# Patient Record
Sex: MALE | Race: OTHER | HISPANIC OR LATINO | URBAN - METROPOLITAN AREA
[De-identification: names, ages, dates, MRNs, and addresses within clinical notes are randomized per-mention and may not be internally consistent; named-entity substitution may affect disease eponyms.]

---

## 2021-08-23 ENCOUNTER — EMERGENCY (EMERGENCY)
Facility: HOSPITAL | Age: 76
LOS: 1 days | Discharge: HOME | End: 2021-08-23
Attending: EMERGENCY MEDICINE
Payer: MEDICARE

## 2021-08-23 ENCOUNTER — TRANSCRIPTION ENCOUNTER (OUTPATIENT)
Age: 76
End: 2021-08-23

## 2021-08-23 VITALS
TEMPERATURE: 103 F | DIASTOLIC BLOOD PRESSURE: 84 MMHG | WEIGHT: 214.95 LBS | RESPIRATION RATE: 19 BRPM | HEART RATE: 109 BPM | SYSTOLIC BLOOD PRESSURE: 190 MMHG | HEIGHT: 69 IN | OXYGEN SATURATION: 93 %

## 2021-08-23 VITALS
HEART RATE: 55 BPM | OXYGEN SATURATION: 94 % | RESPIRATION RATE: 20 BRPM | DIASTOLIC BLOOD PRESSURE: 64 MMHG | SYSTOLIC BLOOD PRESSURE: 170 MMHG | TEMPERATURE: 99 F

## 2021-08-23 LAB
ALBUMIN SERPL ELPH-MCNC: 3.7 G/DL — SIGNIFICANT CHANGE UP (ref 3.3–5.2)
ALP SERPL-CCNC: 179 U/L — HIGH (ref 40–120)
ALT FLD-CCNC: 21 U/L — SIGNIFICANT CHANGE UP
ANION GAP SERPL CALC-SCNC: 16 MMOL/L — SIGNIFICANT CHANGE UP (ref 5–17)
AST SERPL-CCNC: 34 U/L — SIGNIFICANT CHANGE UP
BASOPHILS # BLD AUTO: 0 K/UL — SIGNIFICANT CHANGE UP (ref 0–0.2)
BASOPHILS NFR BLD AUTO: 0 % — SIGNIFICANT CHANGE UP (ref 0–2)
BILIRUB SERPL-MCNC: 1 MG/DL — SIGNIFICANT CHANGE UP (ref 0.4–2)
BUN SERPL-MCNC: 12.9 MG/DL — SIGNIFICANT CHANGE UP (ref 8–20)
CALCIUM SERPL-MCNC: 9 MG/DL — SIGNIFICANT CHANGE UP (ref 8.6–10.2)
CHLORIDE SERPL-SCNC: 92 MMOL/L — LOW (ref 98–107)
CO2 SERPL-SCNC: 23 MMOL/L — SIGNIFICANT CHANGE UP (ref 22–29)
CREAT SERPL-MCNC: 1.02 MG/DL — SIGNIFICANT CHANGE UP (ref 0.5–1.3)
CRP SERPL-MCNC: 178 MG/L — HIGH
EOSINOPHIL # BLD AUTO: 0 K/UL — SIGNIFICANT CHANGE UP (ref 0–0.5)
EOSINOPHIL NFR BLD AUTO: 0 % — SIGNIFICANT CHANGE UP (ref 0–6)
FERRITIN SERPL-MCNC: 614 NG/ML — HIGH (ref 30–400)
GLUCOSE SERPL-MCNC: 114 MG/DL — HIGH (ref 70–99)
HCT VFR BLD CALC: 44.5 % — SIGNIFICANT CHANGE UP (ref 39–50)
HGB BLD-MCNC: 15.6 G/DL — SIGNIFICANT CHANGE UP (ref 13–17)
LYMPHOCYTES # BLD AUTO: 0.54 K/UL — LOW (ref 1–3.3)
LYMPHOCYTES # BLD AUTO: 5.1 % — LOW (ref 13–44)
MANUAL SMEAR VERIFICATION: SIGNIFICANT CHANGE UP
MCHC RBC-ENTMCNC: 30.1 PG — SIGNIFICANT CHANGE UP (ref 27–34)
MCHC RBC-ENTMCNC: 35.1 GM/DL — SIGNIFICANT CHANGE UP (ref 32–36)
MCV RBC AUTO: 85.7 FL — SIGNIFICANT CHANGE UP (ref 80–100)
MONOCYTES # BLD AUTO: 0.73 K/UL — SIGNIFICANT CHANGE UP (ref 0–0.9)
MONOCYTES NFR BLD AUTO: 6.9 % — SIGNIFICANT CHANGE UP (ref 2–14)
NEUTROPHILS # BLD AUTO: 9.29 K/UL — HIGH (ref 1.8–7.4)
NEUTROPHILS NFR BLD AUTO: 88 % — HIGH (ref 43–77)
NT-PROBNP SERPL-SCNC: 2431 PG/ML — HIGH (ref 0–300)
PLAT MORPH BLD: NORMAL — SIGNIFICANT CHANGE UP
PLATELET # BLD AUTO: 169 K/UL — SIGNIFICANT CHANGE UP (ref 150–400)
POTASSIUM SERPL-MCNC: 4 MMOL/L — SIGNIFICANT CHANGE UP (ref 3.5–5.3)
POTASSIUM SERPL-SCNC: 4 MMOL/L — SIGNIFICANT CHANGE UP (ref 3.5–5.3)
PROCALCITONIN SERPL-MCNC: 0.15 NG/ML — HIGH (ref 0.02–0.1)
PROT SERPL-MCNC: 7.7 G/DL — SIGNIFICANT CHANGE UP (ref 6.6–8.7)
RBC # BLD: 5.19 M/UL — SIGNIFICANT CHANGE UP (ref 4.2–5.8)
RBC # FLD: 12 % — SIGNIFICANT CHANGE UP (ref 10.3–14.5)
RBC BLD AUTO: NORMAL — SIGNIFICANT CHANGE UP
SODIUM SERPL-SCNC: 130 MMOL/L — LOW (ref 135–145)
TROPONIN T SERPL-MCNC: <0.01 NG/ML — SIGNIFICANT CHANGE UP (ref 0–0.06)
WBC # BLD: 10.56 K/UL — HIGH (ref 3.8–10.5)
WBC # FLD AUTO: 10.56 K/UL — HIGH (ref 3.8–10.5)

## 2021-08-23 PROCEDURE — 93005 ELECTROCARDIOGRAM TRACING: CPT

## 2021-08-23 PROCEDURE — 71045 X-RAY EXAM CHEST 1 VIEW: CPT | Mod: 26

## 2021-08-23 PROCEDURE — 36415 COLL VENOUS BLD VENIPUNCTURE: CPT

## 2021-08-23 PROCEDURE — 84145 PROCALCITONIN (PCT): CPT

## 2021-08-23 PROCEDURE — U0005: CPT

## 2021-08-23 PROCEDURE — 71045 X-RAY EXAM CHEST 1 VIEW: CPT

## 2021-08-23 PROCEDURE — 99285 EMERGENCY DEPT VISIT HI MDM: CPT | Mod: 25

## 2021-08-23 PROCEDURE — 84484 ASSAY OF TROPONIN QUANT: CPT

## 2021-08-23 PROCEDURE — U0003: CPT

## 2021-08-23 PROCEDURE — 82728 ASSAY OF FERRITIN: CPT

## 2021-08-23 PROCEDURE — 85025 COMPLETE CBC W/AUTO DIFF WBC: CPT

## 2021-08-23 PROCEDURE — 93010 ELECTROCARDIOGRAM REPORT: CPT

## 2021-08-23 PROCEDURE — 94640 AIRWAY INHALATION TREATMENT: CPT

## 2021-08-23 PROCEDURE — 96374 THER/PROPH/DIAG INJ IV PUSH: CPT

## 2021-08-23 PROCEDURE — 80053 COMPREHEN METABOLIC PANEL: CPT

## 2021-08-23 PROCEDURE — 83880 ASSAY OF NATRIURETIC PEPTIDE: CPT

## 2021-08-23 PROCEDURE — 86140 C-REACTIVE PROTEIN: CPT

## 2021-08-23 PROCEDURE — 99285 EMERGENCY DEPT VISIT HI MDM: CPT

## 2021-08-23 RX ORDER — ACETAMINOPHEN 500 MG
975 TABLET ORAL ONCE
Refills: 0 | Status: COMPLETED | OUTPATIENT
Start: 2021-08-23 | End: 2021-08-23

## 2021-08-23 RX ORDER — ALBUTEROL 90 UG/1
2 AEROSOL, METERED ORAL
Qty: 1 | Refills: 0
Start: 2021-08-23 | End: 2021-08-29

## 2021-08-23 RX ORDER — DEXAMETHASONE 0.5 MG/5ML
6 ELIXIR ORAL ONCE
Refills: 0 | Status: COMPLETED | OUTPATIENT
Start: 2021-08-23 | End: 2021-08-23

## 2021-08-23 RX ORDER — SODIUM CHLORIDE 9 MG/ML
1000 INJECTION, SOLUTION INTRAVENOUS ONCE
Refills: 0 | Status: COMPLETED | OUTPATIENT
Start: 2021-08-23 | End: 2021-08-23

## 2021-08-23 RX ORDER — ALBUTEROL 90 UG/1
2 AEROSOL, METERED ORAL ONCE
Refills: 0 | Status: COMPLETED | OUTPATIENT
Start: 2021-08-23 | End: 2021-08-23

## 2021-08-23 RX ADMIN — Medication 975 MILLIGRAM(S): at 16:56

## 2021-08-23 RX ADMIN — ALBUTEROL 2 PUFF(S): 90 AEROSOL, METERED ORAL at 16:56

## 2021-08-23 RX ADMIN — Medication 6 MILLIGRAM(S): at 16:56

## 2021-08-23 RX ADMIN — SODIUM CHLORIDE 1000 MILLILITER(S): 9 INJECTION, SOLUTION INTRAVENOUS at 16:56

## 2021-08-23 RX ADMIN — Medication 100 MILLIGRAM(S): at 19:06

## 2021-08-23 NOTE — ED ADULT NURSE NOTE - OBJECTIVE STATEMENT
Patient presents to ED +COVID for the last ten days, states he has had fever, cough, chills, diarrhea and SOB. last took tylenol for fever at 10AM this morning. denies chest pain, headache, n/v.

## 2021-08-23 NOTE — ED PROVIDER NOTE - OBJECTIVE STATEMENT
74 yo M hx DM, BPH, and HTN p/w fever, cough, and congestion x 5 days. last dose of Tylenol 4 hours prior to arrival. patient flew from [place] 10 days ago. family sick with Covid. patient was vaccinated with 2 dose of Pfizer in July.

## 2021-08-23 NOTE — ED PROVIDER NOTE - PATIENT PORTAL LINK FT
You can access the FollowMyHealth Patient Portal offered by [facility] by registering at the following website: [email or web address]. By joining [employer]’s FollowMyHealth portal, you will also be able to view your health information using other applications (apps) compatible with our system.

## 2021-08-23 NOTE — ED PROVIDER NOTE - PHYSICAL EXAMINATION
VITAL SIGNS: I have reviewed nursing notes and confirm.  CONSTITUTIONAL:  in no acute distress.  SKIN: Skin exam is warm and dry, no acute rash.  HEAD: Normocephalic; atraumatic.  EYES: PERRL, EOM intact; conjunctiva and sclera clear.  ENT: No nasal discharge; airway clear. Throat clear.  NECK: Supple; non tender.    CARD: (+) tachycardia   RESP: No wheezes,  (+) coarse lung sound.   ABD:  soft; non-distended; non-tender;   EXT: Normal ROM. No clubbing, cyanosis or edema.  NEURO: Alert, oriented. Grossly unremarkable. No focal deficits.  moves all extremities,  normal gait   PSYCH: Cooperative, appropriate.

## 2021-08-23 NOTE — ED PROVIDER NOTE - NS ED ROS FT
Review of Systems  •	CONSTITUTIONAL - (+)   fever, no diaphoresis, no weight change  •	SKIN - no rash   •	HEMATOLOGIC - no bleeding, no bruising  •	EYES - no eye pain, no blurred vision  •	ENT - no change in hearing, no pain  •	RESPIRATORY -(+)  shortness of breath, (+)  cough   •	CARDIAC - no chest pain, no palpitations  •	GI - no abd pain, no nausea, no vomiting, no diarrhea, no constipation, no bleeding  •	GENITO-URINARY - no discharge, no dysuria; no hematuria,   •	ENDO - no polydipsia, no polyuria, no heat/no cold intolerance  •	MUSCULOSKELETAL - no joint pain, no swelling, no redness  •	NEUROLOGIC - no weakness, no headache, no anesthesia, no paresthesias  •	PSYCH - no anxiety, non suicidal, non homicidal, no hallucination, no depression

## 2021-08-24 PROBLEM — Z00.00 ENCOUNTER FOR PREVENTIVE HEALTH EXAMINATION: Status: ACTIVE | Noted: 2021-08-24

## 2021-08-24 LAB — SARS-COV-2 RNA SPEC QL NAA+PROBE: DETECTED

## 2021-08-26 ENCOUNTER — OUTPATIENT (OUTPATIENT)
Dept: OUTPATIENT SERVICES | Facility: HOSPITAL | Age: 76
LOS: 1 days | End: 2021-08-26
Payer: MEDICARE

## 2021-08-26 ENCOUNTER — APPOINTMENT (OUTPATIENT)
Dept: DISASTER EMERGENCY | Facility: HOSPITAL | Age: 76
End: 2021-08-26

## 2021-08-26 VITALS
HEART RATE: 48 BPM | TEMPERATURE: 98 F | WEIGHT: 214.95 LBS | RESPIRATION RATE: 18 BRPM | SYSTOLIC BLOOD PRESSURE: 193 MMHG | DIASTOLIC BLOOD PRESSURE: 62 MMHG | HEIGHT: 71 IN | OXYGEN SATURATION: 98 %

## 2021-08-26 VITALS
SYSTOLIC BLOOD PRESSURE: 151 MMHG | HEART RATE: 44 BPM | DIASTOLIC BLOOD PRESSURE: 59 MMHG | TEMPERATURE: 98 F | RESPIRATION RATE: 18 BRPM

## 2021-08-26 DIAGNOSIS — U07.1 COVID-19: ICD-10-CM

## 2021-08-26 PROCEDURE — M0243: CPT

## 2021-08-26 RX ORDER — SODIUM CHLORIDE 9 MG/ML
250 INJECTION INTRAMUSCULAR; INTRAVENOUS; SUBCUTANEOUS
Refills: 0 | Status: DISCONTINUED | OUTPATIENT
Start: 2021-08-26 | End: 2021-09-09

## 2021-08-26 RX ADMIN — SODIUM CHLORIDE 25 MILLILITER(S): 9 INJECTION INTRAMUSCULAR; INTRAVENOUS; SUBCUTANEOUS at 12:33

## 2021-08-26 NOTE — CHART NOTE - NSCHARTNOTEFT_GEN_A_CORE
CC: Monoclonal Antibody Infusion/COVID 19 Positive  75y obese Male with pmhx of DM2, HTN, & BPH, presents today for monoclonal antibody infusion. Patient endorses onset of symptoms 8/19, consisting of cough & congestion, tested + for COVID 8/23, referred by  for infusion today.    exam/findings:  T(C): 36.6 (08-26-21 @ 11:36), Max: 36.6 (08-26-21 @ 11:36)  HR: 48 (08-26-21 @ 11:36) (48 - 48)  BP: 193/62 (08-26-21 @ 11:36) (193/62 - 193/62)  RR: 18 (08-26-21 @ 11:36) (18 - 18)  SpO2: 98% (08-26-21 @ 11:36) (98% - 98%)      PE:   Appearance: NAD	  HEENT:   Normal oral mucosa, 	  Skin: warm and dry  Neurologic: Non-focal  Extremities: Normal range of motion,    ASSESSMENT:  Pt is a 75y obese Male with pmhx of DM2, HTN, & BPH, tested + for COVID 8/23, referred by  to infusion center for Monoclonal antibody infusion (Casirivimab/imdevimab,)  Symptoms/ Criteria: Cough & congestion  Risk Profile includes: Age >55, BMI>25, Hx of HTN & Dm2    PLAN:  - Infusion procedure explained to patient   - Consent for monoclonal antibody infusion obtained   - Risk & benefits discussed/all questions answered  - Infuse Casirivimab/imdevimab 600mg  IV over one hour   - Observe patient for one hour post infusion    I have reviewed the Casirivimab/imdevimab, Emergency Use Authorization (EUA) and I have provided the patient or patient's caregiver with the following information:      1. FDA has authorized emergency use Casirivimab/imdevimab,, which is not an FDA-approved biological product.  2. The patient or patient's caregiver has the option to accept or refuse administration of Casirivimab/imdevimab,.   3. The significant known and potential risks and benefits of Casirivimab/imdevimab, and the extent to which such risks and benefits are unknown.  4. Information on available alternative treatments and risks and benefits of those alternatives. CC: Monoclonal Antibody Infusion/COVID 19 Positive  75y obese Male with pmhx of DM2, HTN, & BPH, presents today for monoclonal antibody infusion. Patient endorses onset of symptoms 8/19, consisting of cough & congestion, tested + for COVID 8/23, referred by  for infusion today. Patient is fully vaccinated.    exam/findings:  T(C): 36.6 (08-26-21 @ 11:36), Max: 36.6 (08-26-21 @ 11:36)  HR: 48 (08-26-21 @ 11:36) (48 - 48)  BP: 193/62 (08-26-21 @ 11:36) (193/62 - 193/62)  RR: 18 (08-26-21 @ 11:36) (18 - 18)  SpO2: 98% (08-26-21 @ 11:36) (98% - 98%)      PE:   Appearance: NAD	  HEENT:   Normal oral mucosa, 	  Skin: warm and dry  Neurologic: Non-focal  Extremities: Normal range of motion,    ASSESSMENT:  Pt is a 75y obese Male with pmhx of DM2, HTN, & BPH, tested + for COVID 8/23, referred by  to infusion center for Monoclonal antibody infusion (Casirivimab/imdevimab,)  Symptoms/ Criteria: Cough & congestion  Risk Profile includes: Age >55, BMI>25, Hx of HTN & Dm2    PLAN:  - Infusion procedure explained to patient   - Consent for monoclonal antibody infusion obtained   - Risk & benefits discussed/all questions answered  - Infuse Casirivimab/imdevimab 600mg  IV over one hour   - Observe patient for one hour post infusion    I have reviewed the Casirivimab/imdevimab, Emergency Use Authorization (EUA) and I have provided the patient or patient's caregiver with the following information:      1. FDA has authorized emergency use Casirivimab/imdevimab,, which is not an FDA-approved biological product.  2. The patient or patient's caregiver has the option to accept or refuse administration of Casirivimab/imdevimab,.   3. The significant known and potential risks and benefits of Casirivimab/imdevimab, and the extent to which such risks and benefits are unknown.  4. Information on available alternative treatments and risks and benefits of those alternatives.

## 2021-08-26 NOTE — CHART NOTE - NSCHARTNOTEFT_GEN_A_CORE
I have reviewed the  Casirivimab/Imdevimab Emergency Use Authorization (EAU) and I have provided the patient or patient's caregiver with the following information:  1. FDA has authorized emergency use of Casirivimab/Imdevimab , which is not FDA-approved biologic product.  2. The patient or patient's caregiver has the option to accept or refuse administration of Casirivimab  3. The significant risks and benefits are unknown.  4. Information on available alternative treatments and risks and benefits of those alternatives.    Discharge:  T(C): 36.7 (08-26-21 @ 13:57), Max: 36.9 (08-26-21 @ 12:40)  HR: 44 (08-26-21 @ 13:57) (42 - 48)  BP: 151/59 (08-26-21 @ 13:57) (140/57 - 193/62)  RR: 18 (08-26-21 @ 13:57) (17 - 20)  SpO2: 95% (08-26-21 @ 12:57) (95% - 98%)  Patient tolerated infusion well denies complaints of chest pain/SOB/dizzines/ palps  VSS for discharge home  D/C instructions given/ fact sheet included.  Patient to follow-up with PCP as needed

## 2021-08-29 ENCOUNTER — TRANSCRIPTION ENCOUNTER (OUTPATIENT)
Age: 76
End: 2021-08-29

## 2021-12-11 ENCOUNTER — RESULT REVIEW (OUTPATIENT)
Age: 76
End: 2021-12-11

## 2021-12-11 ENCOUNTER — INPATIENT (INPATIENT)
Facility: HOSPITAL | Age: 76
LOS: 8 days | Discharge: HOME | DRG: 271 | End: 2021-12-20
Attending: FAMILY MEDICINE | Admitting: HOSPITALIST
Payer: MEDICARE

## 2021-12-11 VITALS
OXYGEN SATURATION: 99 % | HEART RATE: 69 BPM | DIASTOLIC BLOOD PRESSURE: 64 MMHG | HEIGHT: 71 IN | TEMPERATURE: 98 F | RESPIRATION RATE: 16 BRPM | WEIGHT: 212.08 LBS | SYSTOLIC BLOOD PRESSURE: 146 MMHG

## 2021-12-11 DIAGNOSIS — I31.3 PERICARDIAL EFFUSION (NONINFLAMMATORY): ICD-10-CM

## 2021-12-11 LAB
ALBUMIN SERPL ELPH-MCNC: 3.5 G/DL — SIGNIFICANT CHANGE UP (ref 3.3–5.2)
ALP SERPL-CCNC: 215 U/L — HIGH (ref 40–120)
ALT FLD-CCNC: 28 U/L — SIGNIFICANT CHANGE UP
ANION GAP SERPL CALC-SCNC: 11 MMOL/L — SIGNIFICANT CHANGE UP (ref 5–17)
APPEARANCE UR: CLEAR — SIGNIFICANT CHANGE UP
AST SERPL-CCNC: 25 U/L — SIGNIFICANT CHANGE UP
BACTERIA # UR AUTO: ABNORMAL
BASOPHILS # BLD AUTO: 0.03 K/UL — SIGNIFICANT CHANGE UP (ref 0–0.2)
BASOPHILS NFR BLD AUTO: 0.3 % — SIGNIFICANT CHANGE UP (ref 0–2)
BILIRUB SERPL-MCNC: 0.9 MG/DL — SIGNIFICANT CHANGE UP (ref 0.4–2)
BILIRUB UR-MCNC: ABNORMAL
BUN SERPL-MCNC: 19.8 MG/DL — SIGNIFICANT CHANGE UP (ref 8–20)
CALCIUM SERPL-MCNC: 8.4 MG/DL — LOW (ref 8.6–10.2)
CHLORIDE SERPL-SCNC: 100 MMOL/L — SIGNIFICANT CHANGE UP (ref 98–107)
CO2 SERPL-SCNC: 25 MMOL/L — SIGNIFICANT CHANGE UP (ref 22–29)
COLOR SPEC: ABNORMAL
CREAT SERPL-MCNC: 1.34 MG/DL — HIGH (ref 0.5–1.3)
D DIMER BLD IA.RAPID-MCNC: 1136 NG/ML DDU — HIGH
DIFF PNL FLD: ABNORMAL
EOSINOPHIL # BLD AUTO: 0.19 K/UL — SIGNIFICANT CHANGE UP (ref 0–0.5)
EOSINOPHIL NFR BLD AUTO: 1.9 % — SIGNIFICANT CHANGE UP (ref 0–6)
EPI CELLS # UR: SIGNIFICANT CHANGE UP
ERYTHROCYTE [SEDIMENTATION RATE] IN BLOOD: 45 MM/HR — HIGH (ref 0–20)
GLUCOSE SERPL-MCNC: 103 MG/DL — HIGH (ref 70–99)
GLUCOSE UR QL: NEGATIVE MG/DL — SIGNIFICANT CHANGE UP
HCT VFR BLD CALC: 40.3 % — SIGNIFICANT CHANGE UP (ref 39–50)
HGB BLD-MCNC: 13.7 G/DL — SIGNIFICANT CHANGE UP (ref 13–17)
HYALINE CASTS # UR AUTO: ABNORMAL /LPF
IMM GRANULOCYTES NFR BLD AUTO: 1.6 % — HIGH (ref 0–1.5)
KETONES UR-MCNC: ABNORMAL
LACTATE BLDV-MCNC: 0.9 MMOL/L — SIGNIFICANT CHANGE UP (ref 0.5–2)
LEUKOCYTE ESTERASE UR-ACNC: ABNORMAL
LIDOCAIN IGE QN: 57 U/L — HIGH (ref 22–51)
LYMPHOCYTES # BLD AUTO: 0.96 K/UL — LOW (ref 1–3.3)
LYMPHOCYTES # BLD AUTO: 9.7 % — LOW (ref 13–44)
MCHC RBC-ENTMCNC: 30.5 PG — SIGNIFICANT CHANGE UP (ref 27–34)
MCHC RBC-ENTMCNC: 34 GM/DL — SIGNIFICANT CHANGE UP (ref 32–36)
MCV RBC AUTO: 89.8 FL — SIGNIFICANT CHANGE UP (ref 80–100)
MONOCYTES # BLD AUTO: 1.04 K/UL — HIGH (ref 0–0.9)
MONOCYTES NFR BLD AUTO: 10.5 % — SIGNIFICANT CHANGE UP (ref 2–14)
NEUTROPHILS # BLD AUTO: 7.49 K/UL — HIGH (ref 1.8–7.4)
NEUTROPHILS NFR BLD AUTO: 76 % — SIGNIFICANT CHANGE UP (ref 43–77)
NITRITE UR-MCNC: NEGATIVE — SIGNIFICANT CHANGE UP
NT-PROBNP SERPL-SCNC: 423 PG/ML — HIGH (ref 0–300)
PH UR: 5 — SIGNIFICANT CHANGE UP (ref 5–8)
PLATELET # BLD AUTO: 320 K/UL — SIGNIFICANT CHANGE UP (ref 150–400)
POTASSIUM SERPL-MCNC: 4.7 MMOL/L — SIGNIFICANT CHANGE UP (ref 3.5–5.3)
POTASSIUM SERPL-SCNC: 4.7 MMOL/L — SIGNIFICANT CHANGE UP (ref 3.5–5.3)
PROT SERPL-MCNC: 7.2 G/DL — SIGNIFICANT CHANGE UP (ref 6.6–8.7)
PROT UR-MCNC: 30 MG/DL
RAPID RVP RESULT: SIGNIFICANT CHANGE UP
RBC # BLD: 4.49 M/UL — SIGNIFICANT CHANGE UP (ref 4.2–5.8)
RBC # FLD: 12.3 % — SIGNIFICANT CHANGE UP (ref 10.3–14.5)
RBC CASTS # UR COMP ASSIST: SIGNIFICANT CHANGE UP /HPF (ref 0–4)
SARS-COV-2 RNA SPEC QL NAA+PROBE: SIGNIFICANT CHANGE UP
SODIUM SERPL-SCNC: 136 MMOL/L — SIGNIFICANT CHANGE UP (ref 135–145)
SP GR SPEC: 1.02 — SIGNIFICANT CHANGE UP (ref 1.01–1.02)
TROPONIN T SERPL-MCNC: <0.01 NG/ML — SIGNIFICANT CHANGE UP (ref 0–0.06)
TROPONIN T SERPL-MCNC: <0.01 NG/ML — SIGNIFICANT CHANGE UP (ref 0–0.06)
UROBILINOGEN FLD QL: 1 MG/DL
WBC # BLD: 9.87 K/UL — SIGNIFICANT CHANGE UP (ref 3.8–10.5)
WBC # FLD AUTO: 9.87 K/UL — SIGNIFICANT CHANGE UP (ref 3.8–10.5)
WBC UR QL: ABNORMAL

## 2021-12-11 PROCEDURE — 88302 TISSUE EXAM BY PATHOLOGIST: CPT | Mod: 26

## 2021-12-11 PROCEDURE — 99223 1ST HOSP IP/OBS HIGH 75: CPT

## 2021-12-11 PROCEDURE — 93308 TTE F-UP OR LMTD: CPT | Mod: 26

## 2021-12-11 PROCEDURE — 93010 ELECTROCARDIOGRAM REPORT: CPT | Mod: 76

## 2021-12-11 PROCEDURE — 88305 TISSUE EXAM BY PATHOLOGIST: CPT | Mod: 26

## 2021-12-11 PROCEDURE — 99285 EMERGENCY DEPT VISIT HI MDM: CPT | Mod: 25

## 2021-12-11 PROCEDURE — 71045 X-RAY EXAM CHEST 1 VIEW: CPT | Mod: 26

## 2021-12-11 PROCEDURE — 71275 CT ANGIOGRAPHY CHEST: CPT | Mod: 26,ME

## 2021-12-11 PROCEDURE — 74177 CT ABD & PELVIS W/CONTRAST: CPT | Mod: 26,ME

## 2021-12-11 PROCEDURE — 88311 DECALCIFY TISSUE: CPT | Mod: 26

## 2021-12-11 PROCEDURE — G1004: CPT

## 2021-12-11 RX ORDER — INDOMETHACIN 50 MG
25 CAPSULE ORAL ONCE
Refills: 0 | Status: COMPLETED | OUTPATIENT
Start: 2021-12-11 | End: 2021-12-11

## 2021-12-11 RX ORDER — CEPHALEXIN 500 MG
500 CAPSULE ORAL ONCE
Refills: 0 | Status: COMPLETED | OUTPATIENT
Start: 2021-12-11 | End: 2021-12-11

## 2021-12-11 RX ORDER — LABETALOL HCL 100 MG
10 TABLET ORAL ONCE
Refills: 0 | Status: COMPLETED | OUTPATIENT
Start: 2021-12-11 | End: 2021-12-11

## 2021-12-11 RX ORDER — PANTOPRAZOLE SODIUM 20 MG/1
40 TABLET, DELAYED RELEASE ORAL ONCE
Refills: 0 | Status: COMPLETED | OUTPATIENT
Start: 2021-12-11 | End: 2021-12-11

## 2021-12-11 RX ORDER — GABAPENTIN 400 MG/1
600 CAPSULE ORAL
Refills: 0 | Status: DISCONTINUED | OUTPATIENT
Start: 2021-12-11 | End: 2021-12-17

## 2021-12-11 RX ORDER — CARVEDILOL PHOSPHATE 80 MG/1
6.25 CAPSULE, EXTENDED RELEASE ORAL EVERY 12 HOURS
Refills: 0 | Status: DISCONTINUED | OUTPATIENT
Start: 2021-12-11 | End: 2021-12-15

## 2021-12-11 RX ORDER — ALBUTEROL 90 UG/1
2 AEROSOL, METERED ORAL EVERY 6 HOURS
Refills: 0 | Status: DISCONTINUED | OUTPATIENT
Start: 2021-12-11 | End: 2021-12-17

## 2021-12-11 RX ADMIN — Medication 500 MILLIGRAM(S): at 16:43

## 2021-12-11 RX ADMIN — Medication 10 MILLIGRAM(S): at 20:37

## 2021-12-11 RX ADMIN — Medication 25 MILLIGRAM(S): at 20:37

## 2021-12-11 RX ADMIN — PANTOPRAZOLE SODIUM 40 MILLIGRAM(S): 20 TABLET, DELAYED RELEASE ORAL at 19:44

## 2021-12-11 RX ADMIN — Medication 25 MILLIGRAM(S): at 21:20

## 2021-12-11 NOTE — CONSULT NOTE ADULT - SUBJECTIVE AND OBJECTIVE BOX
[place] [facility]                                                        Office: [address] [address]                                                       Telephone: [phone]. Fax:[phone]                                                              CARDIOLOGY CONSULTATION NOTE                                                                                             Consult requested by:  Dr Cotton    PCP- in [place]    Primary Cardiologist.- None    Reason for Consultation:  Pericardial effusion.    History obtained by: Patient and medical record/ Patients daughter at bedside, who is translating - Gibraltarian     obtained: No    Chief complaint:    Patient is a 75y old  Male who presents with a chief complaint of     HPI:  ER HPI- 76 yo M hx of kidney ca and colon ca s/p resection, HTN, prior covid infection p/w sob and pleuritic chest pain x 7 days and hematuria x 6 days. patient report chest pain worse with breathing. Non-exersional. Hematuria started the day after. He had mild b/l flank pain when moving on his side. no pain with urination. no abdominal pain. no nausea and vomiting. He report subjective fever x 3 days that self resolved. no medication was taken. Patient report fully vaccinated for covid. He had a cardiac catherization in [place] about 1 year ago due to HTN and found no blockage. he doesn't see a cardiologist here.     Above HPI noted  Patient is a  76 yo M  male who is currently visiting his daughter from [place], Came to ER wi hx of kidney ca and colon ca s/p resection, HTN, prior covid infection p/w sob and pleuritic chest pain x 7 days and hematuria x 6 days. patient report chest pain worse with breathing. Non-exersional. Hematuria started the day after. He had mild b/l flank pain when moving on his side. Patient denies palpitations. No fever or chills. Patient had a Chest CT which showed pericardial effusion. No PE.    Patient has no prior CAD or MI or CHF. No history of DM.  Patient is a nonsmoker. No TIA or CVA        ALLERGIES: Allergies    penicillin (Rash)    Intolerances          CURRENT MEDICATIONS:  MEDICATIONS  (STANDING):      HOME MEDICATIONS:  Home Medications: Lisinopril 40 mg daily    PAST MEDICAL HISTORY  HTN  Colon CA  Kidney CA    PAST SURGICAL HISTORY  Abdominal surgery for Cancer    FAMILY HISTORY:  Non contributory    SOCIAL HISTORY:       CIGARETTES:   No    ALCOHOL: No    DRUG ABUSE: No      REVIEW OF SYSTEMS:  CONSTITUTIONAL:  as per HPI  HEENT:  Eyes:  No diplopia or blurred vision. ENT:  No earache, sore throat or runny nose.  CARDIOVASCULAR: SEE HPI.  RESPIRATORY:  No cough, shortness of breath, PND or orthopnea.  GASTROINTESTINAL:  No nausea, vomiting or diarrhea.  GENITOURINARY:  No dysuria, frequency or urgency. No Blood in urine  MUSCULOSKELETAL:  no joint aches, no muscle pain  SKIN:  No change in skin, hair or nails.  NEUROLOGIC:  No paresthesias, fasciculations, seizures or weakness.  PSYCHIATRIC:  No disorder of thought or mood.  ENDOCRINE:  No heat or cold intolerance, polyuria or polydipsia.  HEMATOLOGICAL:  No easy bruising or bleeding.           	        Vital Signs Last 24 Hrs  T(C): 36.7 (21 @ 09:43), Max: 36.7 (21 @ 09:43)  T(F): 98 (21 @ 09:43), Max: 98 (21 @ 09:43)  HR: 69 (21 @ 09:43) (69 - 69)  BP: 197/116 (21 @ 18:38) (146/64 - 197/116)  BP(mean): --  RR: 16 (21 @ 09:43) (16 - 16)  SpO2: 99% (21 @ 09:43) (99% - 99%)    PHYSICAL EXAM:  Constitutional: Comfortable . No acute distress.   HEENT: Atraumatic and normcephalic , neck is supple . no JVD. Unremarkable  CNS: Alert and awake, Grossly nonfocal.  Lymph Nodes: Cervical : Not palpable.  Respiratory: Bilateral clear breath sounds.  Cardiovascular: Normal S1S2. . No murmur/rubs or gallop.  Gastrointestinal: Soft non-tender and non distended . +Bowel sounds.   Extremities: No leg edema.   Psychiatric: Calm . no agitation.  Skin: No skin rash.    Intake and output:     LABS:                        13.7   9.87  )-----------( 320      ( 11 Dec 2021 11:34 )             40.3         136  |  100  |  19.8  ----------------------------<  103<H>  4.7   |  25.0  |  1.34<H>    Ca    8.4<L>      11 Dec 2021 11:34    TPro  7.2  /  Alb  3.5  /  TBili  0.9  /  DBili  x   /  AST  25  /  ALT  28  /  AlkPhos  215<H>  11    CARDIAC MARKERS ( 11 Dec 2021 16:50 )  x     / <0.01 ng/mL / x     / x     / x      CARDIAC MARKERS ( 11 Dec 2021 11:34 )  x     / <0.01 ng/mL / x     / x     / x        ;p-BNP=Serum Pro-Brain Natriuretic Peptide: 423 pg/mL ( @ 11:34)      Urinalysis Basic - ( 11 Dec 2021 14:08 )    Color: Radha / Appearance: Clear / S.025 / pH: x  Gluc: x / Ketone: Trace  / Bili: Moderate / Urobili: 1 mg/dL   Blood: x / Protein: 30 mg/dL / Nitrite: Negative   Leuk Esterase: Small / RBC: 0-2 /HPF / WBC 6-10   Sq Epi: x / Non Sq Epi: Few / Bacteria: Few      LIVER FUNCTIONS - ( 11 Dec 2021 11:34 )  Alb: 3.5 g/dL / Pro: 7.2 g/dL / ALK PHOS: 215 U/L / ALT: 28 U/L / AST: 25 U/L / GGT: x           INTERPRETATION OF TELEMETRY: Reviewed by me.   ECG: Reviewed by me.  NSR. PAC's, NST, LVH, Heart rate 62    RADIOLOGY & ADDITIONAL STUDIES/ECHO/CARDIAC CATH:                   < from: CT Angio Chest PE Protocol w/ IV Cont (21 @ 16:33) >  IMPRESSION:  No pulmonary embolus.  Enlarged prostate.  Moderately large pericardial effusion with evidence of right-sided   congestive heart failure.  Indeterminant right renal lesion. Suggest elective ultrasound.        --- End of Report ---            LULU BUTT MD; Attending Radiologist  This document has been electronically signed. Dec 11 2021  4:51PM    < end of copied text >

## 2021-12-11 NOTE — H&P ADULT - HISTORY OF PRESENT ILLNESS
74 y/o female with PMH of kidney ca and colon ca s/p resection, HTN, came to the ED complaining of pleuritic chest pain. Patient said 7-8 days ago, he had     prior covid infection p/w sob and pleuritic chest pain x 7 days and hematuria x 6 days. patient report chest pain worse with breathing. Non-exersional. Hematuria started the day after. He had mild b/l flank pain when moving on his side. no pain with urination. no abdominal pain. no nausea and vomiting. He report subjective fever x 3 days that self resolved. no medication was taken. Patient report fully vaccinated for covid. He had a cardiac catherization in [place] about 1 year ago due to HTN and found no blockage. he doesn't see a cardiologist here.  76 y/o female with PMH of kidney ca and colon ca s/p resection, HTN, came to the ED complaining of pleuritic chest pain. Patient said 7-8 days ago, he had a chest pressure on the left that woke him up from sleep, lasted few minutes and resolved without intervention. After started having chest pain with breathing, radiating to the back associated with shortness of breath. He also endorsed fever started 3 days ago. Of note, he has been having hematuria x 6 days. He has no palpitation, nausea, vomiting, abdominal pain, diarrhea, dysuria, sick contact.

## 2021-12-11 NOTE — CONSULT NOTE ADULT - ASSESSMENT
Patient is a  74 yo M  male who is currently visiting his daughter from [place], Came to ER wiht hx of kidney ca and colon ca s/p resection, HTN, prior covid infection p/w sob and pleuritic chest pain x 7 days and hematuria x 6 days. patient report chest pain worse with breathing. Non-exersional. Hematuria started the day after. He had mild b/l flank pain when moving on his side. Patient denies palpitations. No fever or chills. Patient had a Chest CT which showed pericardial effusion. No PE.    Assessment:  1. Pericardial effusion- Probably had pericarditis about a week ago based on his history- Hemodynamically stable. No tachycardia. No hypotension  2. HTN  3. Prior Colon and Kidney CA    Recommendations:  1. Sed rate/CRP  2. Check TSH  3. ECHO  4. After echo in AM will Consider Rx for Pericarditis with Indocin and Colchcine    Will follow

## 2021-12-11 NOTE — H&P ADULT - ASSESSMENT
76 y/o female with PMH of kidney ca and colon ca s/p resection, HTN, came to the ED complaining of pleuritic chest pain. Patient said 7-8 days ago, he had a chest pressure on the left that woke him up from sleep, lasted few minutes and resolved without intervention. After started having chest pain with breathing, radiating to the back associated with shortness of breath. He also endorsed fever started 3 days ago. Of note, he has been having hematuria x 6 days.     CT chest/abdomen/pelvis with contrast: No pulmonary embolus. Enlarged prostate. Moderately large pericardial effusion with evidence of right-sided congestive heart failure.    Pericardial effusion   Admit to telemetry   CT chest as noted above   Echo ordered   Cardiology on board    76 y/o female with PMH of kidney ca and colon ca s/p resection, HTN, came to the ED complaining of pleuritic chest pain. Patient said 7-8 days ago, he had a chest pressure on the left that woke him up from sleep, lasted few minutes and resolved without intervention. After started having chest pain with breathing, radiating to the back associated with shortness of breath. He also endorsed fever started 3 days ago. Of note, he has been having hematuria x 6 days.     CT chest/abdomen/pelvis with contrast: No pulmonary embolus. Enlarged prostate. Moderately large pericardial effusion with evidence of right-sided congestive heart failure.    Pericardial effusion   Admit to telemetry   CT chest as noted above   ESR: 45; CPR: 152   Echo ordered   Cardiology on board     Hypertensive urgency   Labetalol 10mg IV once given   Will resume home medications   Lisinopril 40mg (monitor renal function, mild increase in Cr noted)  Coreg 6.25mg bid with holding parameters   Will hold Chlorthalidone 25mg   Monitor BP     LINDEN   Cr. 1.34 (baseline 1.02)   Likely due to HTN uncontrolled vs diuretic   Will hold diuretic   Monitor renal function     Neuropathy   Gabapentin 600mg bid     Supportive   DVT prophylaxis: Heparin sc   Diet: DASH     Plan of care discussed with patient using ED         74 y/o female with PMH of kidney ca and colon ca s/p resection, HTN, came to the ED complaining of pleuritic chest pain. Patient said 7-8 days ago, he had a chest pressure on the left that woke him up from sleep, lasted few minutes and resolved without intervention. After started having chest pain with breathing, radiating to the back associated with shortness of breath. He also endorsed fever started 3 days ago. Of note, he has been having hematuria x 6 days.     CT chest/abdomen/pelvis with contrast: No pulmonary embolus. Enlarged prostate. Moderately large pericardial effusion with evidence of right-sided congestive heart failure.    Pericardial effusion   Admit to telemetry   CT chest as noted above   ESR: 45; CPR: 152   Echo ordered   Cardiology on board     Hypertensive urgency   Labetalol 10mg IV once given   Will resume home medications   Lisinopril 40mg (monitor renal function, mild increase in Cr noted)  Coreg 6.25mg bid with holding parameters   Will hold Chlorthalidone 25mg   Monitor BP     LINDEN   Cr. 1.34 (baseline 1.02)   Likely due to HTN uncontrolled vs diuretic   Will hold diuretic   Monitor renal function     Enlarge prostate with hematuria   Will start Flomax 0.4mg     Neuropathy   Gabapentin 600mg bid     Supportive   DVT prophylaxis: Heparin sc   Diet: DASH     Plan of care discussed with patient using ED

## 2021-12-11 NOTE — H&P ADULT - NSHPPHYSICALEXAM_GEN_ALL_CORE
Vital Signs Last 24 Hrs  T(C): 36.7 (11 Dec 2021 09:43), Max: 36.7 (11 Dec 2021 09:43)  T(F): 98 (11 Dec 2021 09:43), Max: 98 (11 Dec 2021 09:43)  HR: 80 (11 Dec 2021 19:47) (69 - 80)  BP: 205/79 (11 Dec 2021 19:47) (146/64 - 205/79)  BP(mean): --  RR: 16 (11 Dec 2021 09:43) (16 - 16)  SpO2: 99% (11 Dec 2021 09:43) (99% - 99%)

## 2021-12-11 NOTE — ED PROVIDER NOTE - OBJECTIVE STATEMENT
76 yo M hx of kidney ca and colon ca s/p resection, HTN, prior covid infection p/w sob and pleuritic chest pain x 7 days and hematuria x 6 days. patient report chest pain worse with breathing. Non-exersional. Hematuria started the day after. He had mild b/l flank pain when moving on his side. no pain with urination. no abdominal pain. no nausea and vomiting. He report subjective fever x 3 days that self resolved. no medication was taken. Patient report fully vaccinated for covid. He had a cardiac catherization in [place] about 1 year ago due to HTN and found no blockage. he doesn't see a cardiologist here.     : [place]

## 2021-12-11 NOTE — ED PROVIDER NOTE - CLINICAL SUMMARY MEDICAL DECISION MAKING FREE TEXT BOX
74 yo M p/w sob and chest pain x 7 days, hematuria x 6 days, subjective fever x 3 days. will check blood work, cxr, ua, ekg,

## 2021-12-11 NOTE — ED ADULT NURSE REASSESSMENT NOTE - NS ED NURSE REASSESS COMMENT FT1
Pt alert and oriented x 4 Faroese/english speaking, no c/o of pain at this time. Daughter at bedside. Pt with elevated BP Labetalol IVP given. MD spoke with pt and daughter. Safety precautions in place. Will continue to monitor

## 2021-12-11 NOTE — ED ADULT TRIAGE NOTE - HEIGHT IN INCHES
11
Implemented All Universal Safety Interventions:  [place] to call system. Call bell, personal items and telephone within reach. Instruct patient to call for assistance. Room bathroom lighting operational. Non-slip footwear when patient is off stretcher. Physically safe environment: no spills, clutter or unnecessary equipment. Stretcher in lowest position, wheels locked, appropriate side rails in place.

## 2021-12-11 NOTE — ED PROVIDER NOTE - PROGRESS NOTE DETAILS
patient seen by cardiology. I spoke with Dr. Shaw, patient doesn't have tamponade clincally. patient can get the echo in the morning. Will check ESR, CRP, TSH, will start indomethacin and PPI.

## 2021-12-11 NOTE — ED ADULT NURSE NOTE - OBJECTIVE STATEMENT
75 M, nad, alert and oriented x 3 c/o sob x one week,  Full physical assessment deferred to physician due to high unit census.

## 2021-12-11 NOTE — ED PROVIDER NOTE - NS ED ROS FT
Review of Systems  •	CONSTITUTIONAL -  (+) subjective fever, no diaphoresis, no weight change  •	SKIN - no rash  •	HEMATOLOGIC - no bleeding, no bruising  •	EYES - no eye pain, no blurred vision  •	ENT - no change in hearing, no pain  •	RESPIRATORY - (+) shortness of breath, no cough  •	CARDIAC -  (+) chest pain, no palpitations  •	GI - no abd pain, no nausea, no vomiting, no diarrhea, no constipation, no bleeding  •	GENITO-URINARY - no discharge, no dysuria;  (+) hematuria,   •	ENDO - no polydipsia, no polyuria, no heat/no cold intolerance  •	MUSCULOSKELETAL - no joint pain, no swelling, no redness  •	NEUROLOGIC - no weakness, no headache, no anesthesia, no paresthesias  •	PSYCH - no anxiety, non suicidal, non homicidal, no hallucination, no depression

## 2021-12-12 DIAGNOSIS — I31.3 PERICARDIAL EFFUSION (NONINFLAMMATORY): ICD-10-CM

## 2021-12-12 DIAGNOSIS — I47.2 VENTRICULAR TACHYCARDIA: ICD-10-CM

## 2021-12-12 DIAGNOSIS — I10 ESSENTIAL (PRIMARY) HYPERTENSION: ICD-10-CM

## 2021-12-12 LAB
ANION GAP SERPL CALC-SCNC: 13 MMOL/L — SIGNIFICANT CHANGE UP (ref 5–17)
BUN SERPL-MCNC: 25.3 MG/DL — HIGH (ref 8–20)
CALCIUM SERPL-MCNC: 7.9 MG/DL — LOW (ref 8.6–10.2)
CHLORIDE SERPL-SCNC: 96 MMOL/L — LOW (ref 98–107)
CO2 SERPL-SCNC: 25 MMOL/L — SIGNIFICANT CHANGE UP (ref 22–29)
CREAT SERPL-MCNC: 1.28 MG/DL — SIGNIFICANT CHANGE UP (ref 0.5–1.3)
CULTURE RESULTS: SIGNIFICANT CHANGE UP
GLUCOSE SERPL-MCNC: 87 MG/DL — SIGNIFICANT CHANGE UP (ref 70–99)
MAGNESIUM SERPL-MCNC: 2 MG/DL — SIGNIFICANT CHANGE UP (ref 1.6–2.6)
POTASSIUM SERPL-MCNC: 4.3 MMOL/L — SIGNIFICANT CHANGE UP (ref 3.5–5.3)
POTASSIUM SERPL-SCNC: 4.3 MMOL/L — SIGNIFICANT CHANGE UP (ref 3.5–5.3)
SODIUM SERPL-SCNC: 134 MMOL/L — LOW (ref 135–145)
SPECIMEN SOURCE: SIGNIFICANT CHANGE UP

## 2021-12-12 PROCEDURE — 93306 TTE W/DOPPLER COMPLETE: CPT | Mod: 26

## 2021-12-12 PROCEDURE — 99233 SBSQ HOSP IP/OBS HIGH 50: CPT

## 2021-12-12 RX ORDER — PANTOPRAZOLE SODIUM 20 MG/1
40 TABLET, DELAYED RELEASE ORAL
Refills: 0 | Status: DISCONTINUED | OUTPATIENT
Start: 2021-12-12 | End: 2021-12-17

## 2021-12-12 RX ORDER — HYDROCHLOROTHIAZIDE 25 MG
25 TABLET ORAL DAILY
Refills: 0 | Status: DISCONTINUED | OUTPATIENT
Start: 2021-12-12 | End: 2021-12-12

## 2021-12-12 RX ORDER — INFLUENZA VIRUS VACCINE 15; 15; 15; 15 UG/.5ML; UG/.5ML; UG/.5ML; UG/.5ML
0.7 SUSPENSION INTRAMUSCULAR ONCE
Refills: 0 | Status: DISCONTINUED | OUTPATIENT
Start: 2021-12-12 | End: 2021-12-20

## 2021-12-12 RX ORDER — HEPARIN SODIUM 5000 [USP'U]/ML
5000 INJECTION INTRAVENOUS; SUBCUTANEOUS EVERY 8 HOURS
Refills: 0 | Status: DISCONTINUED | OUTPATIENT
Start: 2021-12-12 | End: 2021-12-12

## 2021-12-12 RX ORDER — HYDRALAZINE HCL 50 MG
25 TABLET ORAL THREE TIMES A DAY
Refills: 0 | Status: DISCONTINUED | OUTPATIENT
Start: 2021-12-12 | End: 2021-12-13

## 2021-12-12 RX ORDER — COLCHICINE 0.6 MG
0.6 TABLET ORAL
Refills: 0 | Status: DISCONTINUED | OUTPATIENT
Start: 2021-12-12 | End: 2021-12-14

## 2021-12-12 RX ORDER — LISINOPRIL 2.5 MG/1
40 TABLET ORAL DAILY
Refills: 0 | Status: DISCONTINUED | OUTPATIENT
Start: 2021-12-12 | End: 2021-12-13

## 2021-12-12 RX ORDER — HYDRALAZINE HCL 50 MG
25 TABLET ORAL ONCE
Refills: 0 | Status: COMPLETED | OUTPATIENT
Start: 2021-12-12 | End: 2021-12-12

## 2021-12-12 RX ORDER — INDOMETHACIN 50 MG
25 CAPSULE ORAL THREE TIMES A DAY
Refills: 0 | Status: DISCONTINUED | OUTPATIENT
Start: 2021-12-12 | End: 2021-12-13

## 2021-12-12 RX ORDER — TAMSULOSIN HYDROCHLORIDE 0.4 MG/1
0.4 CAPSULE ORAL AT BEDTIME
Refills: 0 | Status: DISCONTINUED | OUTPATIENT
Start: 2021-12-12 | End: 2021-12-17

## 2021-12-12 RX ADMIN — Medication 25 MILLIGRAM(S): at 21:40

## 2021-12-12 RX ADMIN — Medication 0.6 MILLIGRAM(S): at 17:03

## 2021-12-12 RX ADMIN — HEPARIN SODIUM 5000 UNIT(S): 5000 INJECTION INTRAVENOUS; SUBCUTANEOUS at 12:16

## 2021-12-12 RX ADMIN — Medication 25 MILLIGRAM(S): at 05:19

## 2021-12-12 RX ADMIN — CARVEDILOL PHOSPHATE 6.25 MILLIGRAM(S): 80 CAPSULE, EXTENDED RELEASE ORAL at 17:03

## 2021-12-12 RX ADMIN — GABAPENTIN 600 MILLIGRAM(S): 400 CAPSULE ORAL at 05:20

## 2021-12-12 RX ADMIN — GABAPENTIN 600 MILLIGRAM(S): 400 CAPSULE ORAL at 17:03

## 2021-12-12 RX ADMIN — Medication 25 MILLIGRAM(S): at 12:14

## 2021-12-12 RX ADMIN — Medication 25 MILLIGRAM(S): at 12:17

## 2021-12-12 RX ADMIN — LISINOPRIL 40 MILLIGRAM(S): 2.5 TABLET ORAL at 05:42

## 2021-12-12 RX ADMIN — HEPARIN SODIUM 5000 UNIT(S): 5000 INJECTION INTRAVENOUS; SUBCUTANEOUS at 05:42

## 2021-12-12 RX ADMIN — TAMSULOSIN HYDROCHLORIDE 0.4 MILLIGRAM(S): 0.4 CAPSULE ORAL at 21:39

## 2021-12-12 RX ADMIN — Medication 25 MILLIGRAM(S): at 08:40

## 2021-12-12 RX ADMIN — Medication 25 MILLIGRAM(S): at 15:49

## 2021-12-12 RX ADMIN — Medication 25 MILLIGRAM(S): at 17:07

## 2021-12-12 RX ADMIN — CARVEDILOL PHOSPHATE 6.25 MILLIGRAM(S): 80 CAPSULE, EXTENDED RELEASE ORAL at 12:14

## 2021-12-12 NOTE — PROGRESS NOTE ADULT - PROBLEM SELECTOR PLAN 3
- 14 beats, 4 beats  - TTE pending  - Cath in [place] last year, normal cors.   - cont BB will make recommendations after speaking with attending. - 14 beats, 4 beats  - TTE pending  - cont BB will make recommendations after speaking with attending.

## 2021-12-12 NOTE — PROGRESS NOTE ADULT - ASSESSMENT
76 yo M  male who is currently visiting his daughter from [place], Came to ER wiht hx of kidney ca and colon ca s/p resection, HTN, prior covid infection p/w sob and pleuritic chest pain x 7 days and hematuria x 6 days. patient report chest pain worse with breathing. Non-exersional. Hematuria started the day after. He had mild b/l flank pain when moving on his side. Patient denies palpitations. No fever or chills. Patient had a Chest CT which showed pericardial effusion. No PE.

## 2021-12-12 NOTE — PROGRESS NOTE ADULT - PROBLEM SELECTOR PLAN 2
- SBP this am 209  - on hydralazine 25mg TID, Lisinopril 40mg, Carvedilol 6.25mg BID  - increase hydralazine to 50mg TID  - will discuss with Dr. Burch changing Coreg to labeltalol PO  - cont telemetry monitoring for bradycardia   - ? lesion r kidney, renal ultrasound ordered - SBP this am 209  - on hydralazine 25mg TID, Lisinopril 40mg, Carvedilol 6.25mg BID  - increased hydralazine to 50mg TID  - cont coreg  - added HCTZ   - cont telemetry monitoring for bradycardia   - ? lesion r kidney, renal ultrasound ordered - SBP this am 209  - on hydralazine 25mg TID, Lisinopril 40mg, Carvedilol 6.25mg BID  - can increase hydralazine to 50mg TID  - cont coreg  - added HCTZ   - cont telemetry monitoring for bradycardia   - ? lesion r kidney, renal ultrasound ordered

## 2021-12-12 NOTE — PROGRESS NOTE ADULT - SUBJECTIVE AND OBJECTIVE BOX
CHIEF COMPLAINT/INTERVAL HISTORY:    Patient is a 75y old  Male who presents with a chief complaint of SOB    SUBJECTIVE & OBJECTIVE: Pt seen and examined at bedside. No overnight events. Denies SOB at rest or chest pain. Echo (prelim) with moderate effusion, possible pericardiocentesis in AM.     ROS: No chest pain, palpitations, SOB, light headedness, dizziness, headache, nausea/vomiting, fevers/chills, abdominal pain, dysuria or increased urinary frequency.    ICU Vital Signs Last 24 Hrs  T(C): 36.9 (12 Dec 2021 11:44), Max: 37 (12 Dec 2021 04:32)  T(F): 98.4 (12 Dec 2021 11:44), Max: 98.6 (12 Dec 2021 04:32)  HR: 65 (12 Dec 2021 11:44) (58 - 80)  BP: 150/64 (12 Dec 2021 11:44) (112/65 - 209/81)  RR: 20 (12 Dec 2021 11:44) (18 - 20)  SpO2: 95% (12 Dec 2021 11:44) (94% - 98%)    MEDICATIONS  (STANDING):  carvedilol 6.25 milliGRAM(s) Oral every 12 hours  gabapentin 600 milliGRAM(s) Oral two times a day  heparin   Injectable 5000 Unit(s) SubCutaneous every 8 hours  hydrALAZINE 25 milliGRAM(s) Oral three times a day  hydrochlorothiazide 25 milliGRAM(s) Oral daily  lisinopril 40 milliGRAM(s) Oral daily  tamsulosin 0.4 milliGRAM(s) Oral at bedtime    MEDICATIONS  (PRN):  ALBUTerol    90 MICROgram(s) HFA Inhaler 2 Puff(s) Inhalation every 6 hours PRN Shortness of Breath and/or Wheezing      LABS:                        13.7   9.87  )-----------( 320      ( 11 Dec 2021 11:34 )             40.3     -    134<L>  |  96<L>  |  25.3<H>  ----------------------------<  87  4.3   |  25.0  |  1.28    Ca    7.9<L>      12 Dec 2021 07:52  Mg     2.0     12-12    TPro  7.2  /  Alb  3.5  /  TBili  0.9  /  DBili  x   /  AST  25  /  ALT  28  /  AlkPhos  215<H>  12-11      Urinalysis Basic - ( 11 Dec 2021 14:08 )    Color: Radha / Appearance: Clear / S.025 / pH: x  Gluc: x / Ketone: Trace  / Bili: Moderate / Urobili: 1 mg/dL   Blood: x / Protein: 30 mg/dL / Nitrite: Negative   Leuk Esterase: Small / RBC: 0-2 /HPF / WBC 6-10   Sq Epi: x / Non Sq Epi: Few / Bacteria: Few      PHYSICAL EXAM:    GENERAL: elderly male, laying in bed, NAD  HEAD:  Atraumatic, Normocephalic  EYES: EOMI, PERRLA, conjunctiva and sclera clear  ENMT: Moist mucous membranes  NECK: Supple   NERVOUS SYSTEM:  Alert & Oriented X3   CHEST/LUNG: bilateral air entry, coarse  HEART: Regular rate and rhythm; + S1/S2  ABDOMEN: Soft, Nontender, Nondistended; Bowel sounds present  EXTREMITIES:  no pedal edema

## 2021-12-12 NOTE — PROGRESS NOTE ADULT - SUBJECTIVE AND OBJECTIVE BOX
[facility]                                                                Cardiology at [facility]                                                          Office: [address], [place] [address]                                                         Telephone: [phone]. Fax:[phone]                                                                             PROGRESS NOTE  Reason for follow up: Pericardial Effusion   Update:  TTE pending today. Denies chest pain, shortness of breath  Tele: NSVt overnight, 14 beats, 4 beats. Asymptomatic       Review of symptoms:   Cardiac:  No chest pain. No dyspnea. No palpitations.  Respiratory: no cough. No dyspnea  Gastrointestinal: No diarrhea. No abdominal pain. No bleeding.   Neuro: No focal neuro complaints.      Vitals:  T(C): 36.8 (12-12-21 @ 08:38), Max: 37 (12-12-21 @ 04:32)  HR: 61 (12-12-21 @ 06:44) (58 - 80)  BP: 209/81 (12-12-21 @ 08:38) (112/65 - 209/81)  RR: 20 (12-12-21 @ 08:38) (16 - 20)  SpO2: 98% (12-12-21 @ 08:38) (94% - 99%)      Weight (kg): 96.2 (12-11 @ 09:43)      PHYSICAL EXAM:  Appearance: Comfortable. No acute distress  HEENT:  Atraumatic. Normocephalic.  Normal oral mucosa, PERRL, No carotid bruit.   Neurologic: A & O x 3, no focal deficits. EOMI.  Cardiovascular: Normal S1 S2, No murmur, no rubs/gallops. No JVD, No edema  Respiratory: Lungs clear to auscultation, unlabored   Gastrointestinal:  Soft, Non-tender, + BS  Lower Extremities: No edema  Psychiatry: Patient is calm. No agitation.   Skin: No rashes/ ecchymoses/cyanosis/ulcers visualized on the face, hands or feet.      CURRENT MEDICATIONS:  carvedilol 6.25 milliGRAM(s) Oral every 12 hours  hydrALAZINE 25 milliGRAM(s) Oral three times a day  lisinopril 40 milliGRAM(s) Oral daily  tamsulosin 0.4 milliGRAM(s) Oral at bedtime    gabapentin  heparin   Injectable    	      LABS:	 	  CARDIAC MARKERS ( 11 Dec 2021 16:50 )  x     / <0.01 ng/mL / x     / x     / x      p-BNP 11 Dec 2021 16:50: x    , CARDIAC MARKERS ( 11 Dec 2021 11:34 )  x     / <0.01 ng/mL / x     / x     / x      p-BNP 11 Dec 2021 11:34: 423 pg/mL                          13.7   9.87  )-----------( 320      ( 11 Dec 2021 11:34 )             40.3     12-12    134<L>  |  96<L>  |  25.3<H>  ----------------------------<  87  4.3   |  25.0  |  1.28    Ca    7.9<L>      12 Dec 2021 07:52  Mg     2.0     12-12    TPro  7.2  /  Alb  3.5  /  TBili  0.9  /  DBili  x   /  AST  25  /  ALT  28  /  AlkPhos  215<H>  12-11    proBNP: Serum Pro-Brain Natriuretic Peptide: 423 pg/mL (12-11 @ 11:34)      TSH: Thyroid Stimulating Hormone, Serum: 1.71 uIU/mL    TELEMETRY SR, SB, NSVT 14 beats, 4 beats    < from: CT Angio Chest PE Protocol w/ IV Cont (12.11.21 @ 16:33) >  FINDINGS:  CHEST:  LUNGS AND LARGE AIRWAYS: Patent central airways. Right middle and lower   lobe atelectasis.  PLEURA: Small bilateral pleural effusions.  VESSELS: No pulmonary embolus.  HEART: Heart size is normal. Moderately large pericardial effusion   measuring up to 2.3 cm  MEDIASTINUM AND TANNER: No lymphadenopathy.  CHEST WALL AND LOWER NECK: Within normal limits.    ABDOMEN AND PELVIS:  LIVER: Within normal limits.  BILE DUCTS: Normal caliber.  GALLBLADDER: Within normal limits.  SPLEEN: Within normal limits.  PANCREAS: Within normal limits.  ADRENALS: Within normal limits.  KIDNEYS/URETERS: Probable ablation site right lower lobe. Medial mid 1.8   cm right renal hypodensity, possible hyperdense cyst. Recommend elective   correlation with ultrasound.    BLADDER: Mild bladder wall thickening.  REPRODUCTIVE ORGANS: Enlarged prostate.    BOWEL: No bowel obstruction. Appendix normal. Distal descending colonic   anastomosis.  PERITONEUM: No ascites.  VESSELS: Reflux of contrast into the IVC and hepatic veins suggesting   right-sided congestive heart failure.  RETROPERITONEUM/LYMPH NODES: No lymphadenopathy.  ABDOMINAL WALL: Small fat-containing umbilical hernia.  BONES: Degenerative change.    IMPRESSION:  No pulmonary embolus.  Enlarged prostate.  Moderately large pericardial effusion with evidence of right-sided   congestive heart failure.  Indeterminant right renal lesion. Suggest elective ultrasound.    --- End of Report ---          < end of copied text >

## 2021-12-12 NOTE — PATIENT PROFILE ADULT - FALL HARM RISK - UNIVERSAL INTERVENTIONS
Bed in lowest position, wheels locked, appropriate side rails in place/Call bell, personal items and telephone in reach/Instruct patient to call for assistance before getting out of bed or chair/Non-slip footwear when patient is out of bed/[place] to call system/Physically safe environment - no spills, clutter or unnecessary equipment/Purposeful Proactive Rounding/Room/bathroom lighting operational, light cord in reach

## 2021-12-12 NOTE — PROGRESS NOTE ADULT - ASSESSMENT
74 y/o female with PMH of kidney ca and colon ca s/p resection, HTN, came to the ED complaining of pleuritic chest pain. Patient said 7-8 days ago, he had a chest pressure on the left that woke him up from sleep, lasted few minutes and resolved without intervention. After started having chest pain with breathing, radiating to the back associated with shortness of breath. He also endorsed fever started 3 days ago. Of note, he has been having hematuria x 6 days. CT chest/abdomen/pelvis with contrast: No pulmonary embolus. Enlarged prostate. Moderately large pericardial effusion with evidence of right-sided congestive heart failure.    Pericardial effusion   unclear etiology  inflammatory markers elevated  CT chest with moderate effusion  TTE prelim with moderate effusion; f/u official results  NPO for possible pericardiocentesis and possible ischemic evaluation  Cardio recommendations appreciated    Hypertensive urgency   BP remains uncontrolled  Add hydralazine  Continue lisinopril and coreg  HCTZ added per cardio  Monitor BP closely   Low sodium diet    LINDEN on CKD Stage 2   Cr. 1.28 today (1.02 in Aug 2021)  Continue lisinopril at this time  Started on HCTZ per cardio  UA noted; f/u urine culture  CT with renal lesion; will obtain renal/bladder US  Strict I/Os, daily weights  Monitor renal function closely    BPH  CT reviewed  continue Flomax      Neuropathy   Continue Gabapentin BID    DVT prophylaxis: hold Heparin for pericardiocentesis    Plan discussed with patient, cardio NP         74 y/o female with PMH of kidney ca and colon ca s/p resection, HTN, came to the ED complaining of pleuritic chest pain. Patient said 7-8 days ago, he had a chest pressure on the left that woke him up from sleep, lasted few minutes and resolved without intervention. After started having chest pain with breathing, radiating to the back associated with shortness of breath. He also endorsed fever started 3 days ago. Of note, he has been having hematuria x 6 days. CT chest/abdomen/pelvis with contrast: No pulmonary embolus. Enlarged prostate. Moderately large pericardial effusion with evidence of right-sided congestive heart failure.    Moderate Pericardial effusion   unclear etiology  inflammatory markers elevated  CT chest with moderate effusion and signs of right sided congestive failure  TTE prelim with moderate effusion; f/u official results  monitor I/Os, daily weights  Started on colchicine and indomethacin per cardio  NPO for possible pericardiocentesis and possible ischemic evaluation  Cardio recommendations appreciated    Hypertensive urgency   BP remains uncontrolled  Add hydralazine  Continue lisinopril and coreg  Monitor BP closely   Low sodium diet    LINDEN on CKD Stage 2   Cr. 1.28 today (1.02 in Aug 2021)  Continue lisinopril at this time  Hold HCTZ; monitor sodium   UA noted; f/u urine culture  CT with renal lesion; will obtain renal/bladder US  Strict I/Os, daily weights  Monitor renal function closely with the initiation of NSAIDS    BPH  CT reviewed  continue Flomax      Neuropathy   Continue Gabapentin BID    NSVT  keep K > 4 and Mag > 2  continue coreg  may require an ischemic eval  f/u further cardio recommendations    DVT prophylaxis: hold Heparin for pericardiocentesis    Plan discussed with patient, cardio NP         76 y/o female with PMH of kidney ca and colon ca s/p resection, HTN, came to the ED complaining of pleuritic chest pain. Patient said 7-8 days ago, he had a chest pressure on the left that woke him up from sleep, lasted few minutes and resolved without intervention. After started having chest pain with breathing, radiating to the back associated with shortness of breath. He also endorsed fever started 3 days ago. Of note, he has been having hematuria x 6 days. CT chest/abdomen/pelvis with contrast: No pulmonary embolus. Enlarged prostate. Moderately large pericardial effusion with evidence of right-sided congestive heart failure.    Moderate Pericardial effusion   unclear etiology  inflammatory markers elevated  CT chest with moderate effusion and signs of right sided congestive failure  TTE prelim with moderate effusion; f/u official results  monitor I/Os, daily weights  Started on colchicine and indomethacin per cardio  NPO for possible pericardiocentesis and possible ischemic evaluation  Cardio recommendations appreciated    Hypertensive urgency   BP remains uncontrolled  Add hydralazine  Continue lisinopril and coreg  Monitor BP closely   Low sodium diet    LINDEN on CKD Stage 2   Cr. 1.28 today (1.02 in Aug 2021)  Continue lisinopril at this time  Hold HCTZ; monitor sodium   UA noted; f/u urine culture  CT with renal lesion; will obtain renal/bladder US  Strict I/Os, daily weights  Monitor renal function closely with the initiation of NSAIDS    BPH  CT reviewed  continue Flomax      Neuropathy   Continue Gabapentin BID    NSVT  keep K > 4 and Mag > 2  continue coreg  may require an ischemic eval  f/u further cardio recommendations    DVT prophylaxis: hold Heparin for pericardiocentesis in AM    Dispo - Upgrade to SDU with q2 vital checks. No indication for urgent pericardiocentesis given hemodynamic stability per Dr. Burch.     Plan discussed with patient, cardio NP, Dr. Burch

## 2021-12-12 NOTE — PROGRESS NOTE ADULT - PROBLEM SELECTOR PLAN 1
- CT No pulmonary embolus.; Enlarged prostate.; Moderately large pericardial effusion with evidence of right-sided   congestive heart failure.; Indeterminant right renal lesion. Suggest elective ultrasound.  - TTE pending today  - ?malignant pericardial effusion - CT No pulmonary embolus.; Enlarged prostate.; Moderately large pericardial effusion with evidence of right-sided   congestive heart failure.; Indeterminant right renal lesion. Suggest elective ultrasound.  - TTE moderate effusion  - will discuss case with interventional cardiology tomorrow  - NPO after MN for possible pericardiocentesis/LHC   - ?malignant pericardial effusion

## 2021-12-12 NOTE — PROVIDER CONTACT NOTE (CHANGE IN STATUS NOTIFICATION) - BACKGROUND
pt admitted for pericardial effusion and HTN  urgency. Received IVP labetalol in ED, now bradycardic on monitor as low as 46 bpm

## 2021-12-13 ENCOUNTER — RESULT REVIEW (OUTPATIENT)
Age: 76
End: 2021-12-13

## 2021-12-13 LAB
ALBUMIN FLD-MCNC: 2.6 G/DL — SIGNIFICANT CHANGE UP
ANION GAP SERPL CALC-SCNC: 9 MMOL/L — SIGNIFICANT CHANGE UP (ref 5–17)
B PERT IGG+IGM PNL SER: ABNORMAL
BASOPHILS # BLD AUTO: 0.01 K/UL — SIGNIFICANT CHANGE UP (ref 0–0.2)
BASOPHILS NFR BLD AUTO: 0.1 % — SIGNIFICANT CHANGE UP (ref 0–2)
BLD GP AB SCN SERPL QL: SIGNIFICANT CHANGE UP
BUN SERPL-MCNC: 24.8 MG/DL — HIGH (ref 8–20)
CALCIUM SERPL-MCNC: 7.7 MG/DL — LOW (ref 8.6–10.2)
CHLORIDE SERPL-SCNC: 98 MMOL/L — SIGNIFICANT CHANGE UP (ref 98–107)
CO2 SERPL-SCNC: 28 MMOL/L — SIGNIFICANT CHANGE UP (ref 22–29)
COLOR FLD: ABNORMAL
CREAT SERPL-MCNC: 1.4 MG/DL — HIGH (ref 0.5–1.3)
EOSINOPHIL # BLD AUTO: 0.18 K/UL — SIGNIFICANT CHANGE UP (ref 0–0.5)
EOSINOPHIL # FLD: 3 % — SIGNIFICANT CHANGE UP
EOSINOPHIL NFR BLD AUTO: 2.7 % — SIGNIFICANT CHANGE UP (ref 0–6)
FLUID INTAKE SUBSTANCE CLASS: SIGNIFICANT CHANGE UP
FLUID SEGMENTED GRANULOCYTES: 64 % — SIGNIFICANT CHANGE UP
GLUCOSE SERPL-MCNC: 113 MG/DL — HIGH (ref 70–99)
GRAM STN FLD: SIGNIFICANT CHANGE UP
HCT VFR BLD CALC: 33.3 % — LOW (ref 39–50)
HGB BLD-MCNC: 11.4 G/DL — LOW (ref 13–17)
IMM GRANULOCYTES NFR BLD AUTO: 1 % — SIGNIFICANT CHANGE UP (ref 0–1.5)
LYMPHOCYTES # BLD AUTO: 0.82 K/UL — LOW (ref 1–3.3)
LYMPHOCYTES # BLD AUTO: 12.1 % — LOW (ref 13–44)
LYMPHOCYTES # FLD: 22 % — SIGNIFICANT CHANGE UP
MAGNESIUM SERPL-MCNC: 2.2 MG/DL — SIGNIFICANT CHANGE UP (ref 1.6–2.6)
MCHC RBC-ENTMCNC: 30.3 PG — SIGNIFICANT CHANGE UP (ref 27–34)
MCHC RBC-ENTMCNC: 34.2 GM/DL — SIGNIFICANT CHANGE UP (ref 32–36)
MCV RBC AUTO: 88.6 FL — SIGNIFICANT CHANGE UP (ref 80–100)
MONOCYTES # BLD AUTO: 0.82 K/UL — SIGNIFICANT CHANGE UP (ref 0–0.9)
MONOCYTES NFR BLD AUTO: 12.1 % — SIGNIFICANT CHANGE UP (ref 2–14)
MONOS+MACROS # FLD: 11 % — SIGNIFICANT CHANGE UP
NEUTROPHILS # BLD AUTO: 4.86 K/UL — SIGNIFICANT CHANGE UP (ref 1.8–7.4)
NEUTROPHILS NFR BLD AUTO: 72 % — SIGNIFICANT CHANGE UP (ref 43–77)
NIGHT BLUE STAIN TISS: SIGNIFICANT CHANGE UP
NRBC # FLD: 1 % — SIGNIFICANT CHANGE UP (ref 0–0)
PHOSPHATE SERPL-MCNC: 2.9 MG/DL — SIGNIFICANT CHANGE UP (ref 2.4–4.7)
PLATELET # BLD AUTO: 284 K/UL — SIGNIFICANT CHANGE UP (ref 150–400)
POTASSIUM SERPL-MCNC: 3.7 MMOL/L — SIGNIFICANT CHANGE UP (ref 3.5–5.3)
POTASSIUM SERPL-SCNC: 3.7 MMOL/L — SIGNIFICANT CHANGE UP (ref 3.5–5.3)
RBC # BLD: 3.76 M/UL — LOW (ref 4.2–5.8)
RBC # FLD: 11.9 % — SIGNIFICANT CHANGE UP (ref 10.3–14.5)
RCV VOL RI: HIGH /UL (ref 0–0)
SODIUM SERPL-SCNC: 135 MMOL/L — SIGNIFICANT CHANGE UP (ref 135–145)
SPECIMEN SOURCE: SIGNIFICANT CHANGE UP
SPECIMEN SOURCE: SIGNIFICANT CHANGE UP
TOTAL NUCLEATED CELL COUNT, BODY FLUID: 5815 /UL — SIGNIFICANT CHANGE UP
TUBE TYPE: SIGNIFICANT CHANGE UP
WBC # BLD: 6.76 K/UL — SIGNIFICANT CHANGE UP (ref 3.8–10.5)
WBC # FLD AUTO: 6.76 K/UL — SIGNIFICANT CHANGE UP (ref 3.8–10.5)

## 2021-12-13 PROCEDURE — 99233 SBSQ HOSP IP/OBS HIGH 50: CPT

## 2021-12-13 PROCEDURE — 93308 TTE F-UP OR LMTD: CPT | Mod: 26

## 2021-12-13 PROCEDURE — 99152 MOD SED SAME PHYS/QHP 5/>YRS: CPT

## 2021-12-13 PROCEDURE — 33016 PERICARDIOCENTESIS W/IMAGING: CPT

## 2021-12-13 PROCEDURE — 88112 CYTOPATH CELL ENHANCE TECH: CPT | Mod: 26

## 2021-12-13 PROCEDURE — 88305 TISSUE EXAM BY PATHOLOGIST: CPT | Mod: 26

## 2021-12-13 PROCEDURE — 93456 R HRT CORONARY ARTERY ANGIO: CPT | Mod: 26

## 2021-12-13 RX ORDER — HYDRALAZINE HCL 50 MG
10 TABLET ORAL ONCE
Refills: 0 | Status: COMPLETED | OUTPATIENT
Start: 2021-12-13 | End: 2021-12-13

## 2021-12-13 RX ORDER — HYDRALAZINE HCL 50 MG
50 TABLET ORAL THREE TIMES A DAY
Refills: 0 | Status: DISCONTINUED | OUTPATIENT
Start: 2021-12-13 | End: 2021-12-17

## 2021-12-13 RX ORDER — POTASSIUM CHLORIDE 20 MEQ
10 PACKET (EA) ORAL
Refills: 0 | Status: COMPLETED | OUTPATIENT
Start: 2021-12-13 | End: 2021-12-13

## 2021-12-13 RX ORDER — ONDANSETRON 8 MG/1
4 TABLET, FILM COATED ORAL ONCE
Refills: 0 | Status: COMPLETED | OUTPATIENT
Start: 2021-12-13 | End: 2021-12-13

## 2021-12-13 RX ORDER — AMLODIPINE BESYLATE 2.5 MG/1
5 TABLET ORAL DAILY
Refills: 0 | Status: DISCONTINUED | OUTPATIENT
Start: 2021-12-13 | End: 2021-12-17

## 2021-12-13 RX ORDER — SODIUM CHLORIDE 9 MG/ML
1000 INJECTION INTRAMUSCULAR; INTRAVENOUS; SUBCUTANEOUS
Refills: 0 | Status: COMPLETED | OUTPATIENT
Start: 2021-12-13 | End: 2021-12-13

## 2021-12-13 RX ADMIN — Medication 25 MILLIGRAM(S): at 05:29

## 2021-12-13 RX ADMIN — GABAPENTIN 600 MILLIGRAM(S): 400 CAPSULE ORAL at 17:53

## 2021-12-13 RX ADMIN — Medication 0.6 MILLIGRAM(S): at 05:29

## 2021-12-13 RX ADMIN — GABAPENTIN 600 MILLIGRAM(S): 400 CAPSULE ORAL at 05:31

## 2021-12-13 RX ADMIN — Medication 10 MILLIGRAM(S): at 10:30

## 2021-12-13 RX ADMIN — Medication 0.6 MILLIGRAM(S): at 17:50

## 2021-12-13 RX ADMIN — Medication 50 MILLIGRAM(S): at 23:42

## 2021-12-13 RX ADMIN — Medication 50 MILLIGRAM(S): at 14:37

## 2021-12-13 RX ADMIN — CARVEDILOL PHOSPHATE 6.25 MILLIGRAM(S): 80 CAPSULE, EXTENDED RELEASE ORAL at 18:00

## 2021-12-13 RX ADMIN — SODIUM CHLORIDE 60 MILLILITER(S): 9 INJECTION INTRAMUSCULAR; INTRAVENOUS; SUBCUTANEOUS at 16:08

## 2021-12-13 RX ADMIN — LISINOPRIL 40 MILLIGRAM(S): 2.5 TABLET ORAL at 05:35

## 2021-12-13 RX ADMIN — Medication 100 MILLIEQUIVALENT(S): at 14:37

## 2021-12-13 RX ADMIN — ONDANSETRON 4 MILLIGRAM(S): 8 TABLET, FILM COATED ORAL at 10:40

## 2021-12-13 RX ADMIN — TAMSULOSIN HYDROCHLORIDE 0.4 MILLIGRAM(S): 0.4 CAPSULE ORAL at 23:42

## 2021-12-13 RX ADMIN — Medication 100 MILLIEQUIVALENT(S): at 12:10

## 2021-12-13 RX ADMIN — Medication 100 MILLIGRAM(S): at 11:59

## 2021-12-13 RX ADMIN — Medication 100 MILLIEQUIVALENT(S): at 13:49

## 2021-12-13 NOTE — PROGRESS NOTE ADULT - SUBJECTIVE AND OBJECTIVE BOX
Department of Cardiology                                                                  [facility]/[place] [address] [address]                                                            Telephone: [phone]. Fax:[phone]                                                                             Pre/Post- Procedure Progress Note      HPI: Reason for follow up: Pericardial Effusion   Update: Denies chest pain, shortness of breath  Tele: NSVt overnight, 14 beats, 4 beats. Asymptomatic       Review of symptoms:   Cardiac:  No chest pain. No dyspnea. No palpitations.  Respiratory: no cough. No dyspnea  Gastrointestinal: No diarrhea. No abdominal pain. No bleeding.   Neuro: No focal neuro complaints.     Echo (Date, Findings): < from: TTE Echo Complete w/o Contrast w/ Doppler (21 @ 08:47) >    EXAM:  ECHO TTE WO CON COMP W DOPP      PROCEDURE DATE:  Dec 12 2021   .      INTERPRETATION:  TRANSTHORACIC ECHOCARDIOGRAM REPORT        Patient Name:   ONEIDA OCHOA Patient Location: [facility] Rec #:  [id number]             Accession #:      [id number]  Account #:      [id number]             Height:           71.0 in 180.3 cm  YOB: 1945           Weight:           212.1 lb 96.20 kg  Patient Age:    75 years             BSA:              2.16 m²  Patient Gender: M                 BP:               182/80 mmHg      Date of Exam:        2021 8:47:23 AM  Sonographer:         Vanessa Murphy  Referring Physician: Vinh Cotton    Procedure:   2D Echo/Doppler/Color Doppler Complete.  Indications: Pericardial effusion (noninflammatory) - I31.3  Diagnosis:   Pericardial effusion (noninflammatory) - I31.3        2D AND M-MODE MEASUREMENTS (normal ranges within parentheses):  Left                 Normal   Aorta/Left            Normal  Ventricle:                    Atrium:  IVSd (2D):    1.38  (0.7-1.1) Left Atrium    4.71  (1.9-4.0)                 cm             (2D):           cm  LVPWd (2D):   1.41  (0.7-1.1) LA Volume      40.6                 cm             Index         ml/m²  LVIDd (2D):   7.05  (3.4-5.7) Right Ventricle:                 cm             RVd (2D):        3.64 cm  LVIDs (2D):   4.70                 cm  LV FS (2D):   33.3   (>25%)                  %  Relative Wall 0.40   (<0.42)  Thickness    LV SYSTOLIC FUNCTION BY 2D PLANIMETRY (MOD):  EF-A4C View: 61.8 % EF-A2C View: 55.0 % EF-Biplane: 57 %    LV DIASTOLIC FUNCTION:  MV Peak E: 0.87 m/s e', MV Viky: 0.06 m/s  MV Peak A: 0.76 m/s E/e' Ratio: 14.51  E/A Ratio: 1.14     Decel Time: 199 msec    SPECTRAL DOPPLER ANALYSIS (where applicable):  Mitral Valve:  MV P1/2 Time: 57.75 msec  MV Area, PHT: 3.81 cm²    Aortic Valve: AoV Max Eris: 3.03 m/s AoV Peak P.8 mmHg AoV Mean PG:   15.3 mmHg    LVOT Vmax: 1.30 m/s LVOT VTI: 0.276 m LVOT Diameter: 2.48 cm    AoV Area, Vmax: 2.07 cm² AoV Area, VTI: 2.63 cm² AoV Area, Vmn:  Ao VTI: 0.507  Aortic Insufficiency:  AI Half-time:  485 msec  AI Decel Rate: 3.06 m/s²    Tricuspid Valve and PA/RV Systolic Pressure: TR Max Velocity: 3.38 m/s RA   Pressure: 3 mmHg RVSP/PASP: 48.6 mmHg      PHYSICIAN INTERPRETATION:  Left Ventricle: The left ventricular internal cavity size is normal.  Global LV systolic function was normal. Left ventricular ejection   fraction, by visual estimation, is 55 to 60%. Normal segmental left   ventricular systolic function. Spectral Doppler shows impaired relaxation   pattern of left ventricular myocardial filling (Grade I diastolic   dysfunction).  Right Ventricle: TV S' 0.2 m/s.  Left Atrium: Mildly enlarged left atrium.  Right Atrium: The right atrium is normal in size.  Pericardium: A moderately sized pericardial effusion is present. The   pericardial effusion is globally located around the entire heart. There   is excessive respiratory variation in the tricuspid valve spectral   Doppler velocities and excessive respiratory variation in the mitral   valve spectral Doppler velocities. There is respiratory variation of   Mitral inflow >25%, RA diastolic collapse, suggestive of early tamponade   physiology.  Mitral Valve: Structurally normal mitral valve, with normal leaflet   excursion. There is mild mitral annular calcification. No evidence of   mitral stenosis. Trace mitral valve regurgitation is seen.  Tricuspid Valve: Mild tricuspid regurgitation is visualized. Estimated   pulmonary artery systolic pressure is 48.6 mmHg assuming a right atrial   pressure of 3 mmHg, which is consistent with mild pulmonary hypertension.  Aortic Valve: Peak transaortic gradient equals 36.8 mmHg, mean   transaortic gradient equals 15.3 mmHg, the calculated aortic valve area   equals 2.63 cm² by the continuity equation consistent with normally   opening aortic valve. Moderate aortic valve regurgitation is seen.  Pulmonic Valve: Structurally normal pulmonic valve, with normal leaflet   excursion. Trace pulmonic valve regurgitation.  Aorta: The aortic root and ascending aorta are structurally normal, with   no evidence of dilitation.  Pulmonary Artery: The main pulmonary artery is normal in size.  Venous: The inferior vena cava is normal. The inferior vena cava was   dilated, with respiratory size variation greater than 50%.      Summary:   1. Left ventricular ejection fraction, by visual estimation, is 55 to   60%.   2. Normal global left ventricular systolic function.   3. LV Ejection Fraction by Prince's Method with a biplane EF of 57 %.   4. Spectral Doppler shows impaired relaxation pattern of left   ventricular myocardial filling (Grade I diastolic dysfunction).   5. There is moderate concentric left ventricular hypertrophy.   6. Mildly enlarged left atrium.   7. Moderate pericardial effusion.   8. Mild mitral annular calcification.   9. Trace mitral valve regurgitation.  10. Mild tricuspid regurgitation.  11. Moderate aortic regurgitation.  12. Estimated pulmonary artery systolic pressure is 48.6 mmHg assuming a   right atrial pressure of 3 mmHg, which is consistent with mild pulmonary   hypertension.  13. There is respiratory variation of Mitral inflow >25%, RA diastolic   collapse, suggestive of early tamponade physiology.    < end of copied text >      Additional Diagnostics: < from: CT Angio Chest PE Protocol w/ IV Cont (21 @ 16:33) >  ACC: [id number] [facility]                        ACC: [id number] EXAM:  CT ABDOMEN AND PELVIS IC                          PROCEDURE DATE:  2021          INTERPRETATION:  CLINICAL INFORMATION: Hematuria. 75-year-old man.  History of renal cancer. Prior Covid infection with shortness of breath   and pleuritic chest pain for one week    COMPARISON: None.    CONTRAST/COMPLICATIONS:  IV Contrast: IV contrast documented in associated exam (accession   [id number]), Omnipaque 350 (accession [id number])  89 cc administered   11 cc   discarded  Oral Contrast: NONE  Complications: None reported at time of study completion    PROCEDURE:  CT Angiography of the Chest was performed followed by portal venous phase   imaging of theAbdomen and Pelvis.  Sagittal and coronal reformats were performed as well as 3D (MIP)   reconstructions.    FINDINGS:  CHEST:  LUNGS AND LARGE AIRWAYS: Patent central airways. Right middle and lower   lobe atelectasis.  PLEURA: Small bilateral pleural effusions.  VESSELS: No pulmonary embolus.  HEART: Heart size is normal. Moderately large pericardial effusion   measuring up to 2.3 cm  MEDIASTINUM AND TANNER: No lymphadenopathy.  CHEST WALL AND LOWER NECK: Within normal limits.    ABDOMEN AND PELVIS:  LIVER: Within normal limits.  BILE DUCTS: Normal caliber.  GALLBLADDER: Within normal limits.  SPLEEN: Within normal limits.  PANCREAS: Within normal limits.  ADRENALS: Within normal limits.  KIDNEYS/URETERS: Probable ablation site right lower lobe. Medial mid 1.8   cm right renal hypodensity, possible hyperdense cyst. Recommend elective   correlation with ultrasound.    BLADDER: Mild bladder wall thickening.  REPRODUCTIVE ORGANS: Enlarged prostate.    BOWEL: No bowel obstruction. Appendix normal. Distal descending colonic   anastomosis.  PERITONEUM: No ascites.  VESSELS: Reflux of contrast into the IVC and hepatic veins suggesting   right-sided congestive heart failure.  RETROPERITONEUM/LYMPH NODES: No lymphadenopathy.  ABDOMINAL WALL: Small fat-containing umbilical hernia.  BONES: Degenerative change.    IMPRESSION:  No pulmonary embolus.  Enlarged prostate.  Moderately large pericardial effusion with evidence of right-sided   congestive heart failure.  Indeterminant right renal lesion. Suggest elective ultrasound.    < end of copied text >      Risk Assessments:  ASA:  2  Mallampati:  2  Bleeding Risk:  2.4%  Creatinine: 1.4  GFR:  48    Associated Risk Factors:        Cerebrovascular Disease: N/A       Chronic Lung Disease: N/A       Peripheral Arterial Disease: N/A       Chronic Kidney Disease (if yes, what is GFR): N/A       Uncontrolled Diabetes (if yes, what is HgbA1C or FBS): N/A       Poorly Controlled Hypertension (if yes, what is SBP): N/A       Morbid Obesity (if yes, what is BMI): N/A       History of Recent Ventricular Arrhythmia: N/A       Inability to Ambulate Safely: N/A       Need for Therapeutic Anticoagulation: N/A       Antiplatelet or Contrast Allergy: N/A    	  MEDICATIONS:  carvedilol 6.25 milliGRAM(s) Oral every 12 hours  hydrALAZINE 25 milliGRAM(s) Oral three times a day  lisinopril 40 milliGRAM(s) Oral daily  tamsulosin 0.4 milliGRAM(s) Oral at bedtime    clindamycin IVPB 900 milliGRAM(s) IV Intermittent once    ALBUTerol    90 MICROgram(s) HFA Inhaler 2 Puff(s) Inhalation every 6 hours PRN    gabapentin 600 milliGRAM(s) Oral two times a day  indomethacin 25 milliGRAM(s) Oral three times a day    pantoprazole    Tablet 40 milliGRAM(s) Oral before breakfast    colchicine 0.6 milliGRAM(s) Oral two times a day    influenza  Vaccine (HIGH DOSE) 0.7 milliLiter(s) IntraMuscular once        PHYSICAL EXAM:    Constitutional: A & O x 3  HEENT:   Normal oral mucosa, PERRL, EOMI	  Cardiovascular: Normal S1 S2, soft syst murmur   Respiratory: Lungs essentially clear to auscultation	  Gastrointestinal:  Soft, Non-tender, + BS	  Neurologic: Non-focal  Extremities: Normal range of motion, No clubbing, cyanosis or edema  Vascular: Peripheral pulses palpable 2+ bilaterally      T(C): 36.7 (21 @ 07:31), Max: 36.9 (21 @ 11:44)  HR: 56 (21 @ 07:31) (46 - 65)  BP: 206/90 (21 @ 07:31) (133/55 - 206/90)  RR: 16 (21 @ 07:31) (16 - 20)  SpO2: 56% (21 @ 07:31) (56% - 97%)  Wt(kg): --      TELEMETRY: SB	          LABS:	 	                         11.4   6.76  )-----------( 284      ( 13 Dec 2021 03:42 )             33.3         135  |  98  |  24.8<H>  ----------------------------<  113<H>  3.7   |  28.0  |  1.40<H>    Ca    7.7<L>      13 Dec 2021 03:42  Phos  2.9       Mg     2.2         TPro  7.2  /  Alb  3.5  /  TBili  0.9  /  DBili  x   /  AST  25  /  ALT  28  /  AlkPhos  215<H>  11    76 yo M  male who is currently visiting his daughter from [place], Came to ER wiht hx of kidney ca and colon ca s/p resection, HTN, prior covid infection p/w sob and pleuritic chest pain x 7 days and hematuria x 6 days. patient report chest pain worse with breathing. Non-exersional. Hematuria started the day after. He had mild b/l flank pain when moving on his side. Patient denies palpitations. No fever or chills. Patient had a Chest CT which showed pericardial effusion. No PE.      Problem/Plan - 1:  ·  Problem: Pericardial effusion.   ·  Plan: - CT No pulmonary embolus.; Enlarged prostate.; Moderately large pericardial effusion with evidence of right-sided   congestive heart failure.; Indeterminant right renal lesion. Suggest elective ultrasound.  - TTE moderate effusion  - NPO after MN confirmed for pericardiocentesis/R&LHC   - ?malignant pericardial effusion.    Problem/Plan - 2:  ·  Problem: HTN (hypertension).   - on hydralazine 25mg TID, Lisinopril 40mg, Carvedilol 6.25mg BID  - cont coreg  - added HCTZ   - cont telemetry monitoring for bradycardia   - ? lesion r kidney, renal ultrasound ordered.    Problem/Plan - 3:  ·  Problem: NSVT (nonsustained ventricular tachycardia).   - cont BB will make recommendations after speaking with attending.Impression:    Plan:  -plan for R&LHC/Pericaiocentesis  -patient seen and examined  -confirmed appropriate NPO duration  -ECG and Labs reviewed  -procedure discussed with patient; risks and benefits explained, questions answered  -consent obtained by attending IC

## 2021-12-13 NOTE — PROGRESS NOTE ADULT - ASSESSMENT
74 y/o female with PMH of kidney ca and colon ca s/p resection, HTN, came to the ED complaining of pleuritic chest pain. Patient said 7-8 days ago, he had a chest pressure on the left that woke him up from sleep, lasted few minutes and resolved without intervention. After started having chest pain with breathing, radiating to the back associated with shortness of breath. He also endorsed fever started 3 days ago. Of note, he has been having hematuria x 6 days. CT chest/abdomen/pelvis with contrast: No pulmonary embolus. Enlarged prostate. Moderately large pericardial effusion with evidence of right-sided congestive heart failure.    Moderate Pericardial effusion   unclear etiology, possibly secondary to malignancy  inflammatory markers elevated  CT chest with moderate effusion and signs of right sided congestive failure  TTE prelim with moderate effusion   Started on colchicine and indomethacin but given concerns for malignancy indomethacin discontinued   f/u pericardial fluid studies; including culture and cytology  repeat limited TTE on 12/14  Cardio recommendations appreciated    Hypertensive urgency   BP remains uncontrolled  Hydralazine increased to 50 mg TID  Continue lisinopril and coreg  Monitor BP closely   Low sodium diet    LINDEN on CKD Stage 2   Creatinine trending up to 1.4; will d/c NSAIDs  s/p cardiac cath; start judicious hydration x 1 liter  Continue lisinopril at this time   UA noted; f/u urine culture  CT with renal lesion; renal/bladder US pending  Strict I/Os, daily weights  Monitor renal function closely    BPH  CT reviewed  continue Flomax      Neuropathy   Continue Gabapentin BID    NSVT  keep K > 4 and Mag > 2  KCL ordered  continue coreg  s/p cath with mild CAD  continue telemonitoring    DVT prophylaxis: hold Heparin today     Dispo - Repeat TTE in AM. Transfer to telemetry.    Plan discussed with patient, cath lab NP, Dr. Burch, RN     74 y/o female with PMH of kidney ca and colon ca s/p resection, HTN, came to the ED complaining of pleuritic chest pain. Patient said 7-8 days ago, he had a chest pressure on the left that woke him up from sleep, lasted few minutes and resolved without intervention. After started having chest pain with breathing, radiating to the back associated with shortness of breath. He also endorsed fever started 3 days ago. Of note, he has been having hematuria x 6 days. CT chest/abdomen/pelvis with contrast: No pulmonary embolus. Enlarged prostate. Moderately large pericardial effusion with evidence of right-sided congestive heart failure.    Moderate Pericardial effusion   unclear etiology, possibly secondary to malignancy  inflammatory markers elevated  CT chest with moderate effusion and signs of right sided congestive failure  TTE prelim with moderate effusion and preserved EF  Cardiac cath with mild CAD  Started on colchicine and indomethacin but given concerns for malignancy as the etiology; cardio recommended to d/c indomethacin discontinued   f/u pericardial fluid studies; including culture and cytology  repeat limited TTE on 12/14  Cardio recommendations appreciated    Hypertensive urgency   BP remains uncontrolled  Hydralazine increased to 50 mg TID  s/p lisinopril but will hold given rise in creatinine  Continue Coreg and add norvasc  Monitor BP closely   Low sodium diet    LINDEN on CKD Stage 2   Likely medication related vs prerenal azotemia  Creatinine trending up to 1.4; will d/c NSAIDs and lisinopril  s/p cardiac cath; start judicious hydration x 1 liter  UA noted; f/u urine culture  CT with renal lesion; renal/bladder US pending  Strict I/Os, daily weights  Monitor renal function closely    BPH  CT reviewed  continue Flomax      Neuropathy   Continue Gabapentin BID    NSVT  keep K > 4 and Mag > 2  KCL ordered  continue coreg  s/p cath with mild CAD  continue telemonitoring    History of Colon CA s/p resection and Renal CA  -CT c/a/p reviewed; renal lesion noted and sono pending as above  -given bloody pericardial effusion; high suspicion for malignancy  -hem/onc evaluation requested    DVT prophylaxis: resume Heparin once cleared by cardio    Dispo - Repeat limited TTE in AM, may require pericardial window if persistent effusion. Transfer to telemetry.    Plan discussed with patient, cath lab NP, Dr. Burch, RN

## 2021-12-13 NOTE — PROGRESS NOTE ADULT - ASSESSMENT
Post- Procedure Note: R&Left Heart Cardiac Catheterization/Pericardiocentesis       Narrative:  75y  Male now s/p R&LHC/Pericardiocentesis via RFV/RRA with normal coronary arteries, RA 22/20/14, PA 55/17/29, PCWP 27/26/21, PA sat 69.5% with minimal CAD and 450cc bloody drainage removed. Procedure performed by Dr. Morrell. He received Fentanyl and Versed IV intraprocedurally, arrived to recovery in NAD and HDS, RFV/RRA access site stable, no bleed/hematoma, distal pulse +  Tolerated procedure well       Objective:  Vital Signs Last 24 Hrs  T(C): 36.7 (13 Dec 2021 07:31), Max: 36.9 (12 Dec 2021 11:44)  T(F): 98.1 (13 Dec 2021 07:31), Max: 98.4 (12 Dec 2021 11:44)  HR: 56 (13 Dec 2021 07:31) (46 - 65)  BP: 206/90 (13 Dec 2021 07:31) (133/55 - 206/90)  BP(mean): --  RR: 16 (13 Dec 2021 07:31) (16 - 20)  SpO2: 56% (13 Dec 2021 07:31) (56% - 97%)      PHYSICAL EXAM:  Constitutional: A & O x 3  HEENT:   Normal oral mucosa, PERRL, EOMI	  Cardiovascular: Normal S1 S2, soft syst murmur   Respiratory: Lungs essentially clear to auscultation	  Gastrointestinal:  Soft, Non-tender, + BS	  Neurologic: Non-focal  Extremities: Normal range of motion, No clubbing, cyanosis or edema  Vascular: Peripheral pulses palpable 2+ bilaterally  Vascular: RRA Access site stable, no bleed or hematoma, RFV site CDI, distal pulses +     Labs:                           11.4   6.76  )-----------( 284      ( 13 Dec 2021 03:42 )             33.3     12-13    135  |  98  |  24.8<H>  ----------------------------<  113<H>  3.7   |  28.0  |  1.40<H>    Ca    7.7<L>      13 Dec 2021 03:42  Phos  2.9     12-13  Mg     2.2     12-13    TPro  7.2  /  Alb  3.5  /  TBili  0.9  /  DBili  x   /  AST  25  /  ALT  28  /  AlkPhos  215<H>  12-11          Assessment:    75y  Male now s/p left heart catheterization via RRA/RFV with min CAD. RRA/RFV access site stable, no bleed/hematoma, distal pulse +,     Plan:  -Formal cath report pending  -Post procedure management/monitoring per protocol  -Access site precautions  -Radial compression band removal at 1100  -Bedrest x 1 hours post procedure  -Continue current medical therapy  -Cont BB    -Cont statin therapy   -Clindamycin 900 mg x 1 IVPB  -Educated regarding post procedure management and care  -Discussed the importance of RF modification  -F/U pericardial fluid cultures

## 2021-12-13 NOTE — PROGRESS NOTE ADULT - SUBJECTIVE AND OBJECTIVE BOX
CHIEF COMPLAINT/INTERVAL HISTORY:    Patient is a 75y old  Male who presents with a chief complaint of SOB (12 Dec 2021 12:31)      HPI:  76 y/o female with PMH of kidney ca and colon ca s/p resection, HTN, came to the ED complaining of pleuritic chest pain. Patient said 7-8 days ago, he had a chest pressure on the left that woke him up from sleep, lasted few minutes and resolved without intervention. After started having chest pain with breathing, radiating to the back associated with shortness of breath. He also endorsed fever started 3 days ago. Of note, he has been having hematuria x 6 days. He has no palpitation, nausea, vomiting, abdominal pain, diarrhea, dysuria, sick contact.    (11 Dec 2021 20:23)      SUBJECTIVE & OBJECTIVE: Pt seen and examined at bedside.     ICU Vital Signs Last 24 Hrs  T(C): 36.7 (13 Dec 2021 07:31), Max: 36.7 (13 Dec 2021 04:00)  T(F): 98.1 (13 Dec 2021 07:31), Max: 98.1 (13 Dec 2021 07:31)  HR: 56 (13 Dec 2021 15:00) (46 - 69)  BP: 107/65 (13 Dec 2021 15:00) (107/65 - 206/90)  BP(mean): --  ABP: --  ABP(mean): --  RR: 17 (13 Dec 2021 15:00) (15 - 18)  SpO2: 96% (13 Dec 2021 15:00) (95% - 97%)        MEDICATIONS  (STANDING):  carvedilol 6.25 milliGRAM(s) Oral every 12 hours  colchicine 0.6 milliGRAM(s) Oral two times a day  gabapentin 600 milliGRAM(s) Oral two times a day  hydrALAZINE 50 milliGRAM(s) Oral three times a day  influenza  Vaccine (HIGH DOSE) 0.7 milliLiter(s) IntraMuscular once  lisinopril 40 milliGRAM(s) Oral daily  pantoprazole    Tablet 40 milliGRAM(s) Oral before breakfast  sodium chloride 0.9%. 1000 milliLiter(s) (60 mL/Hr) IV Continuous <Continuous>  tamsulosin 0.4 milliGRAM(s) Oral at bedtime    MEDICATIONS  (PRN):  ALBUTerol    90 MICROgram(s) HFA Inhaler 2 Puff(s) Inhalation every 6 hours PRN Shortness of Breath and/or Wheezing      LABS:                        11.4   6.76  )-----------( 284      ( 13 Dec 2021 03:42 )             33.3     12-13    135  |  98  |  24.8<H>  ----------------------------<  113<H>  3.7   |  28.0  |  1.40<H>    Ca    7.7<L>      13 Dec 2021 03:42  Phos  2.9     12-13  Mg     2.2     12-13        PHYSICAL EXAM:    GENERAL: NAD, well-groomed, well-developed  HEAD:  Atraumatic, Normocephalic  EYES: EOMI, PERRLA, conjunctiva and sclera clear  ENMT: Moist mucous membranes  NECK: Supple, No JVD  NERVOUS SYSTEM:  Alert & Oriented X3, Motor Strength 5/5 B/L upper and lower extremities; DTRs 2+ intact and symmetric  CHEST/LUNG: Clear to auscultation bilaterally; No rales, rhonchi, wheezing, or rubs  HEART: Regular rate and rhythm; No murmurs, rubs, or gallops  ABDOMEN: Soft, Nontender, Nondistended; Bowel sounds present  EXTREMITIES:  2+ Peripheral Pulses, No clubbing, cyanosis, or edema        DVT/GI ppx  Discussed with pt @ bedside CHIEF COMPLAINT/INTERVAL HISTORY:    Patient is a 75y old  Male who presents with a chief complaint of SOB (12 Dec 2021 12:31)    SUBJECTIVE & OBJECTIVE: Pt seen and examined at bedside. No overnight events. Patient seen s/p cath and pericardiocentesis. Denies chest pain or SOB. States he is worried because he was told the effusion could be secondary to malignancy. Hem/onc consulted.    ROS: No chest pain, palpitations, SOB, light headedness, dizziness, headache, nausea/vomiting, fevers/chills, abdominal pain, dysuria or increased urinary frequency.    ICU Vital Signs Last 24 Hrs  T(C): 36.7 (13 Dec 2021 07:31), Max: 36.7 (13 Dec 2021 04:00)  T(F): 98.1 (13 Dec 2021 07:31), Max: 98.1 (13 Dec 2021 07:31)  HR: 56 (13 Dec 2021 15:00) (46 - 69)  BP: 107/65 (13 Dec 2021 15:00) (107/65 - 206/90)  RR: 17 (13 Dec 2021 15:00) (15 - 18)  SpO2: 96% (13 Dec 2021 15:00) (95% - 97%)    MEDICATIONS  (STANDING):  carvedilol 6.25 milliGRAM(s) Oral every 12 hours  colchicine 0.6 milliGRAM(s) Oral two times a day  gabapentin 600 milliGRAM(s) Oral two times a day  hydrALAZINE 50 milliGRAM(s) Oral three times a day  influenza  Vaccine (HIGH DOSE) 0.7 milliLiter(s) IntraMuscular once  lisinopril 40 milliGRAM(s) Oral daily  pantoprazole    Tablet 40 milliGRAM(s) Oral before breakfast  sodium chloride 0.9%. 1000 milliLiter(s) (60 mL/Hr) IV Continuous <Continuous>  tamsulosin 0.4 milliGRAM(s) Oral at bedtime    MEDICATIONS  (PRN):  ALBUTerol    90 MICROgram(s) HFA Inhaler 2 Puff(s) Inhalation every 6 hours PRN Shortness of Breath and/or Wheezing      LABS:                        11.4   6.76  )-----------( 284      ( 13 Dec 2021 03:42 )             33.3     12-13    135  |  98  |  24.8<H>  ----------------------------<  113<H>  3.7   |  28.0  |  1.40<H>    Ca    7.7<L>      13 Dec 2021 03:42  Phos  2.9     12-13  Mg     2.2     12-13        PHYSICAL EXAM:    GENERAL: elderly male, laying in bed, NAD  HEAD:  Atraumatic, Normocephalic  EYES: EOMI, PERRLA, conjunctiva and sclera clear  ENMT: Moist mucous membranes  NECK: Supple   NERVOUS SYSTEM:  Alert & Oriented X3   CHEST/LUNG: bilateral air entry, coarse  HEART: Regular rate and rhythm; + S1/S2  ABDOMEN: Soft, Nontender, Nondistended; Bowel sounds present  EXTREMITIES:  no pedal edema

## 2021-12-14 LAB
ANION GAP SERPL CALC-SCNC: 10 MMOL/L — SIGNIFICANT CHANGE UP (ref 5–17)
BASOPHILS # BLD AUTO: 0.02 K/UL — SIGNIFICANT CHANGE UP (ref 0–0.2)
BASOPHILS NFR BLD AUTO: 0.2 % — SIGNIFICANT CHANGE UP (ref 0–2)
BUN SERPL-MCNC: 20 MG/DL — SIGNIFICANT CHANGE UP (ref 8–20)
CALCIUM SERPL-MCNC: 8 MG/DL — LOW (ref 8.6–10.2)
CHLORIDE SERPL-SCNC: 100 MMOL/L — SIGNIFICANT CHANGE UP (ref 98–107)
CO2 SERPL-SCNC: 25 MMOL/L — SIGNIFICANT CHANGE UP (ref 22–29)
CREAT SERPL-MCNC: 1.03 MG/DL — SIGNIFICANT CHANGE UP (ref 0.5–1.3)
EOSINOPHIL # BLD AUTO: 0.05 K/UL — SIGNIFICANT CHANGE UP (ref 0–0.5)
EOSINOPHIL NFR BLD AUTO: 0.4 % — SIGNIFICANT CHANGE UP (ref 0–6)
GLUCOSE BLDC GLUCOMTR-MCNC: 112 MG/DL — HIGH (ref 70–99)
GLUCOSE FLD-MCNC: 82 MG/DL — SIGNIFICANT CHANGE UP
GLUCOSE SERPL-MCNC: 116 MG/DL — HIGH (ref 70–99)
HCT VFR BLD CALC: 37.1 % — LOW (ref 39–50)
HGB BLD-MCNC: 12.5 G/DL — LOW (ref 13–17)
IMM GRANULOCYTES NFR BLD AUTO: 0.5 % — SIGNIFICANT CHANGE UP (ref 0–1.5)
LDH SERPL L TO P-CCNC: 910 U/L — SIGNIFICANT CHANGE UP
LYMPHOCYTES # BLD AUTO: 0.62 K/UL — LOW (ref 1–3.3)
LYMPHOCYTES # BLD AUTO: 5.4 % — LOW (ref 13–44)
MAGNESIUM SERPL-MCNC: 2 MG/DL — SIGNIFICANT CHANGE UP (ref 1.6–2.6)
MCHC RBC-ENTMCNC: 30.1 PG — SIGNIFICANT CHANGE UP (ref 27–34)
MCHC RBC-ENTMCNC: 33.7 GM/DL — SIGNIFICANT CHANGE UP (ref 32–36)
MCV RBC AUTO: 89.4 FL — SIGNIFICANT CHANGE UP (ref 80–100)
MONOCYTES # BLD AUTO: 1.18 K/UL — HIGH (ref 0–0.9)
MONOCYTES NFR BLD AUTO: 10.3 % — SIGNIFICANT CHANGE UP (ref 2–14)
NEUTROPHILS # BLD AUTO: 9.56 K/UL — HIGH (ref 1.8–7.4)
NEUTROPHILS NFR BLD AUTO: 83.2 % — HIGH (ref 43–77)
PHOSPHATE SERPL-MCNC: 2.5 MG/DL — SIGNIFICANT CHANGE UP (ref 2.4–4.7)
PLATELET # BLD AUTO: 355 K/UL — SIGNIFICANT CHANGE UP (ref 150–400)
POTASSIUM SERPL-MCNC: 4.7 MMOL/L — SIGNIFICANT CHANGE UP (ref 3.5–5.3)
POTASSIUM SERPL-SCNC: 4.7 MMOL/L — SIGNIFICANT CHANGE UP (ref 3.5–5.3)
PROT FLD-MCNC: 5.1 G/DL — SIGNIFICANT CHANGE UP
RBC # BLD: 4.15 M/UL — LOW (ref 4.2–5.8)
RBC # FLD: 12.1 % — SIGNIFICANT CHANGE UP (ref 10.3–14.5)
SODIUM SERPL-SCNC: 135 MMOL/L — SIGNIFICANT CHANGE UP (ref 135–145)
WBC # BLD: 11.49 K/UL — HIGH (ref 3.8–10.5)
WBC # FLD AUTO: 11.49 K/UL — HIGH (ref 3.8–10.5)

## 2021-12-14 PROCEDURE — 93308 TTE F-UP OR LMTD: CPT | Mod: 26

## 2021-12-14 PROCEDURE — 99233 SBSQ HOSP IP/OBS HIGH 50: CPT

## 2021-12-14 PROCEDURE — 99223 1ST HOSP IP/OBS HIGH 75: CPT

## 2021-12-14 PROCEDURE — 76770 US EXAM ABDO BACK WALL COMP: CPT | Mod: 26

## 2021-12-14 PROCEDURE — 99221 1ST HOSP IP/OBS SF/LOW 40: CPT

## 2021-12-14 RX ADMIN — Medication 0.6 MILLIGRAM(S): at 06:59

## 2021-12-14 RX ADMIN — CARVEDILOL PHOSPHATE 6.25 MILLIGRAM(S): 80 CAPSULE, EXTENDED RELEASE ORAL at 17:18

## 2021-12-14 RX ADMIN — GABAPENTIN 600 MILLIGRAM(S): 400 CAPSULE ORAL at 05:05

## 2021-12-14 RX ADMIN — TAMSULOSIN HYDROCHLORIDE 0.4 MILLIGRAM(S): 0.4 CAPSULE ORAL at 21:06

## 2021-12-14 RX ADMIN — Medication 50 MILLIGRAM(S): at 21:05

## 2021-12-14 RX ADMIN — Medication 50 MILLIGRAM(S): at 05:05

## 2021-12-14 RX ADMIN — Medication 50 MILLIGRAM(S): at 11:19

## 2021-12-14 RX ADMIN — AMLODIPINE BESYLATE 5 MILLIGRAM(S): 2.5 TABLET ORAL at 05:05

## 2021-12-14 RX ADMIN — PANTOPRAZOLE SODIUM 40 MILLIGRAM(S): 20 TABLET, DELAYED RELEASE ORAL at 05:06

## 2021-12-14 RX ADMIN — GABAPENTIN 600 MILLIGRAM(S): 400 CAPSULE ORAL at 17:18

## 2021-12-14 RX ADMIN — CARVEDILOL PHOSPHATE 6.25 MILLIGRAM(S): 80 CAPSULE, EXTENDED RELEASE ORAL at 05:06

## 2021-12-14 NOTE — PROGRESS NOTE ADULT - ASSESSMENT
74 yo M  male who is currently visiting his daughter from [place], Came to ER wiht hx of kidney ca and colon ca s/p resection, HTN, prior covid infection p/w sob and pleuritic chest pain x 7 days and hematuria x 6 days. patient report chest pain worse with breathing. Non-exersional. Hematuria started the day after. He had mild b/l flank pain when moving on his side. Patient denies palpitations. No fever or chills. Patient had a Chest CT which showed pericardial effusion. No PE.      S/p coronary angiogram  s/p pericardiocentesis   Pericardial effusion   HTN Urgency   NSVT  - now s/p L+RHC shwing min CAD and PCWP 21   - CT A chest without evidence of PE showing moderate-large pericardial effusion  - s/p pericardiocentesis w/ 450mL output sanguinous which is suspicious for malignancy   - need heme/onc eval   - repeat TTE to assess size of effusion   - if recurrent, pt will need pericardial window   - hypertensive w/ SBP >200   - continue amlodipine, hydralazine, coreg  - initial goal SBP <150, pt currently stable, if needed can increase amlodipine to 10mg or add losartan 25mg daily   - no further NSVT, maintain electrolytes  - cardiac rehab info provided/referral and communication to cardiac rehab completed   - Diet/lifestyle modifications and medication compliance heavily reinforced   - Patient educated about groin site care, signs and symptoms of complication post procedure, and plan of care moving forward. Patient verbalized understanding with teach-back.

## 2021-12-14 NOTE — CONSULT NOTE ADULT - SUBJECTIVE AND OBJECTIVE BOX
REASON FOR CONSULT: ***    CONSULT REQUESTED BY: ***    Patient is a 75y old  Male who presents with a chief complaint of pericardial effusion (14 Dec 2021 16:19)      BRIEF HOSPITAL COURSE:   74 yo male hx HTN, Colon CA (resolved), Renal CA presented to the hospital initially with chest pain. Found to have increased size pericardial effusion, drained by pericardiocentesis on 12/13 with bloody fluid drained. TTE on 12/14/21 revealing increased size of pericardial effusion. Thoracic surgery planning for pericardial window on 12/16/21. Patient currently admitted to hospitalist service, has been stable on the floor.     Events last 24 hours:   YEIMI showing increased size of moderately sized pericardial effusion.     PAST MEDICAL & SURGICAL HISTORY:  HTN (hypertension)    Neuropathy      Allergies    penicillin (Rash)    Intolerances      FAMILY HISTORY:  FHx: stomach cancer (Father)        Review of Systems:  CONSTITUTIONAL: No fever, chills, or fatigue  EYES: No eye pain, visual disturbances, or discharge  ENMT:  No difficulty hearing, tinnitus, vertigo; No sinus or throat pain  NECK: No pain or stiffness  RESPIRATORY: No cough, wheezing, chills or hemoptysis; No shortness of breath  CARDIOVASCULAR: No chest pain, palpitations, dizziness, or leg swelling  GASTROINTESTINAL: No abdominal or epigastric pain. No nausea, vomiting, or hematemesis; No diarrhea or constipation. No melena or hematochezia.  GENITOURINARY: No dysuria, frequency, hematuria, or incontinence  NEUROLOGICAL: No headaches, memory loss, loss of strength, numbness, or tremors  SKIN: No itching, burning, rashes, or lesions   MUSCULOSKELETAL: No joint pain or swelling; No muscle, back, or extremity pain  PSYCHIATRIC: No depression, anxiety, mood swings, or difficulty sleeping      Medications:    amLODIPine   Tablet 5 milliGRAM(s) Oral daily  carvedilol 6.25 milliGRAM(s) Oral every 12 hours  hydrALAZINE 50 milliGRAM(s) Oral three times a day  tamsulosin 0.4 milliGRAM(s) Oral at bedtime    ALBUTerol    90 MICROgram(s) HFA Inhaler 2 Puff(s) Inhalation every 6 hours PRN    gabapentin 600 milliGRAM(s) Oral two times a day        pantoprazole    Tablet 40 milliGRAM(s) Oral before breakfast          influenza  Vaccine (HIGH DOSE) 0.7 milliLiter(s) IntraMuscular once              ICU Vital Signs Last 24 Hrs  T(C): 37.2 (14 Dec 2021 16:09), Max: 37.2 (14 Dec 2021 04:31)  T(F): 98.9 (14 Dec 2021 16:09), Max: 98.9 (14 Dec 2021 04:31)  HR: 57 (14 Dec 2021 16:09) (57 - 70)  BP: 155/60 (14 Dec 2021 16:09) (147/61 - 166/76)  BP(mean): 109 (13 Dec 2021 19:19) (109 - 109)  ABP: --  ABP(mean): --  RR: 18 (14 Dec 2021 16:09) (16 - 19)  SpO2: 97% (14 Dec 2021 16:09) (92% - 98%)    Vital Signs Last 24 Hrs  T(C): 37.2 (14 Dec 2021 16:09), Max: 37.2 (14 Dec 2021 04:31)  T(F): 98.9 (14 Dec 2021 16:09), Max: 98.9 (14 Dec 2021 04:31)  HR: 57 (14 Dec 2021 16:09) (57 - 70)  BP: 155/60 (14 Dec 2021 16:09) (147/61 - 166/76)  BP(mean): 109 (13 Dec 2021 19:19) (109 - 109)  RR: 18 (14 Dec 2021 16:09) (16 - 19)  SpO2: 97% (14 Dec 2021 16:09) (92% - 98%)        I&O's Detail        LABS:                        12.5   11.49 )-----------( 355      ( 14 Dec 2021 07:31 )             37.1     12-14    135  |  100  |  20.0  ----------------------------<  116<H>  4.7   |  25.0  |  1.03    Ca    8.0<L>      14 Dec 2021 07:31  Phos  2.5     12-14  Mg     2.0     12-14            CAPILLARY BLOOD GLUCOSE      POCT Blood Glucose.: 112 mg/dL (14 Dec 2021 17:12)        CULTURES:  Culture Results:   No growth (12-13 @ 16:26)  Culture Results:   <10,000 CFU/mL Normal Urogenital Sury (12-11 @ 22:11)      Physical Examination:    General: No acute distress.  Alert, oriented, interactive, nonfocal    HEENT: Pupils equal, reactive to light.  Symmetric.    PULM: Clear to auscultation bilaterally, no significant sputum production    CVS: Regular rate and rhythm, no muffled heart sounds noted. No pulsus paradoxus on exam.     ABD: Soft, nondistended, nontender, normoactive bowel sounds, no masses    EXT: No edema, nontender    SKIN: Warm and well perfused, no rashes noted.    CRITICAL CARE TIME SPENT: 30 minutes

## 2021-12-14 NOTE — PROGRESS NOTE ADULT - SUBJECTIVE AND OBJECTIVE BOX
[id number]            [facility]                          [address] [place] [address]                       Phone: [phone]. Fax:[phone]                      ________________________________________________    HPI:  76 y/o female with PMH of kidney ca and colon ca s/p resection, HTN, came to the ED complaining of pleuritic chest pain. Patient said 7-8 days ago, he had a chest pressure on the left that woke him up from sleep, lasted few minutes and resolved without intervention. After started having chest pain with breathing, radiating to the back associated with shortness of breath. He also endorsed fever started 3 days ago. Of note, he has been having hematuria x 6 days. He has no palpitation, nausea, vomiting, abdominal pain, diarrhea, dysuria, sick contact.    (11 Dec 2021 20:23)    HOME MEDICATIONS:  carvedilol 6.25 mg oral tablet: 1 tab(s) orally 2 times a day (11 Dec 2021 21:32)  chlorthalidone 25 mg oral tablet: 1 tab(s) orally once a day (11 Dec 2021 21:32)  gabapentin 600 mg oral tablet: 1 tab(s) orally 2 times a day (11 Dec 2021 21:32)  lisinopril 40 mg oral tablet: 1 tab(s) orally once a day (11 Dec 2021 21:32)      ROS: All review of systems negative unless indicated otherwise below.                         PHYSICAL EXAM:    GENERAL: NAD  NECK: Supple, No JVD  NERVOUS SYSTEM:  Alert & Oriented X3, non focal neuro exam.   CHEST/LUNG: clear lungs, No rales, rhonchi, wheezing, or rubs  HEART: Regular rate and rhythm; s1 and s2 auscultated, No murmurs, rubs, or gallops  ABDOMEN: Soft, Nontender, Nondistended; Bowel sounds present and normoactive.   EXTREMITIES:  2+ Peripheral Pulses, No clubbing, cyanosis, or edema  CATH SITE: Right groin benign s/p cath.  No bleeding, no ecchymosis, no hematoma. Extremity Warm to touch, with palpable distal pulses, and brisk capillary refill.        Vital Signs Last 24 Hrs  T(C): 36.6 (14 Dec 2021 10:24), Max: 37.2 (14 Dec 2021 04:31)  T(F): 97.8 (14 Dec 2021 10:24), Max: 98.9 (14 Dec 2021 04:31)  HR: 62 (14 Dec 2021 10:24) (54 - 70)  BP: 148/69 (14 Dec 2021 10:24) (107/65 - 186/79)  BP(mean): 109 (13 Dec 2021 19:19) (109 - 109)  RR: 19 (14 Dec 2021 10:24) (16 - 19)  SpO2: 97% (14 Dec 2021 10:24) (92% - 98%)                                                  INTAKE AND OUTPUT - 48 HOUR TREND                                                        LAB RESULTS                 COMPLETE BLOOD COUNT( 14 Dec 2021 07:31 )                            12.5 g/dL<L>  11.49 K/uL<H> )---------------( 355 K/uL                        37.1 %<L>      Automated Differential     Auto Basophil # - 0.02 K/uL  Auto Basophil % - 0.2 %  Auto Eosinophil # - 0.05 K/uL  Auto Eosinophil % - 0.4 %  Auto Immature Granulocyte # - X      Auto Immature Granulocyte % - 0.5 %  Auto Lymphocyte # - 0.62 K/uL<L>  Auto Lymphocyte % - 5.4 %<L>  Auto Monocyte # - 1.18 K/uL<H>  Auto Monocyte % - 10.3 %  Auto Neutrophil # - 9.56 K/uL<H>  Auto Neutrophil % - 83.2 %<H>                                  CHEMISTRY                 Basic Metabolic Panel (12-14-21 @ 07:31)    135  |  100  |  20.0  ----------------------------<  116<H>  4.7   |  25.0  |  1.03    Ca    8.0<L>      14 Dec 2021 07:31  Phos  2.5     12-14  Mg     2.0     12-14                    Liver Functions (12-11-21 @ 11:34))  TPro  7.2  /  Alb  3.5  /  TBili  0.9  /  DBili  x   /  AST  25  /  ALT  28  /  AlkPhos  215                                 Cardiac Enzymes   ( 11 Dec 2021 16:50 )  Troponin T  <0.01,  CPK  X    , CKMB  X    , BNP X        , ( 11 Dec 2021 11:34 )  Troponin T  <0.01,  CPK  X    , CKMB  X    , <H>                                       Current Admission Active Medications    ALBUTerol    90 MICROgram(s) HFA Inhaler 2 Puff(s) Inhalation every 6 hours PRN Shortness of Breath and/or Wheezing  amLODIPine   Tablet 5 milliGRAM(s) Oral daily  carvedilol 6.25 milliGRAM(s) Oral every 12 hours  gabapentin 600 milliGRAM(s) Oral two times a day  hydrALAZINE 50 milliGRAM(s) Oral three times a day  influenza  Vaccine (HIGH DOSE) 0.7 milliLiter(s) IntraMuscular once  pantoprazole    Tablet 40 milliGRAM(s) Oral before breakfast  tamsulosin 0.4 milliGRAM(s) Oral at bedtime

## 2021-12-14 NOTE — CONSULT NOTE ADULT - PROBLEM SELECTOR RECOMMENDATION 9
Re-accumulation of pericardial effusion s/p drainage this admission.  Concern for malignant effusion with significant cancer history.   Thoracic surgery consulted for pericardial window.  Tentatively plan for OR for pericardial window thursday 12/16 w/ Dr. Tena.   COVID swab in AM; T&S current.

## 2021-12-14 NOTE — PROGRESS NOTE ADULT - SUBJECTIVE AND OBJECTIVE BOX
CHIEF COMPLAINT/INTERVAL HISTORY:    Patient is a 75y old  Male who presents with a chief complaint of chest pain, sob (14 Dec 2021 08:24)    SUBJECTIVE & OBJECTIVE: Pt seen and examined at bedside. No overnight events. Reports feeling tired, denies chest pain or worsening SOB.    ROS: No chest pain, palpitations, SOB, light headedness, dizziness, headache, nausea/vomiting, fevers/chills, abdominal pain, dysuria.    ICU Vital Signs Last 24 Hrs  T(C): 36.6 (14 Dec 2021 10:24), Max: 37.2 (14 Dec 2021 04:31)  T(F): 97.8 (14 Dec 2021 10:24), Max: 98.9 (14 Dec 2021 04:31)  HR: 62 (14 Dec 2021 10:24) (58 - 70)  BP: 148/69 (14 Dec 2021 10:24) (147/61 - 169/75)  BP(mean): 109 (13 Dec 2021 19:19) (109 - 109)  RR: 19 (14 Dec 2021 10:24) (16 - 19)  SpO2: 97% (14 Dec 2021 10:24) (92% - 98%)    MEDICATIONS  (STANDING):  amLODIPine   Tablet 5 milliGRAM(s) Oral daily  carvedilol 6.25 milliGRAM(s) Oral every 12 hours  gabapentin 600 milliGRAM(s) Oral two times a day  hydrALAZINE 50 milliGRAM(s) Oral three times a day  influenza  Vaccine (HIGH DOSE) 0.7 milliLiter(s) IntraMuscular once  pantoprazole    Tablet 40 milliGRAM(s) Oral before breakfast  tamsulosin 0.4 milliGRAM(s) Oral at bedtime    MEDICATIONS  (PRN):  ALBUTerol    90 MICROgram(s) HFA Inhaler 2 Puff(s) Inhalation every 6 hours PRN Shortness of Breath and/or Wheezing      LABS:                        12.5   11.49 )-----------( 355      ( 14 Dec 2021 07:31 )             37.1     12-14    135  |  100  |  20.0  ----------------------------<  116<H>  4.7   |  25.0  |  1.03    Ca    8.0<L>      14 Dec 2021 07:31  Phos  2.5     12-14  Mg     2.0     12-14      PHYSICAL EXAM:    GENERAL: elderly male, laying in bed, NAD  HEAD:  Atraumatic, Normocephalic  EYES: EOMI, PERRLA, conjunctiva and sclera clear  ENMT: Moist mucous membranes  NECK: Supple   NERVOUS SYSTEM:  Alert & Oriented X3   CHEST/LUNG: bilateral air entry, coarse  HEART: Regular rate and rhythm; + S1/S2  ABDOMEN: Soft, Nontender, Nondistended; Bowel sounds present  EXTREMITIES:  no pedal edema

## 2021-12-14 NOTE — CONSULT NOTE ADULT - SUBJECTIVE AND OBJECTIVE BOX
Onc history (treatment in [place])  2012- diagnosed with colon cancer s/p resection only  2014 - recurrent colon cancer s/p resection only again  Both presumably early stage.  2014 - right kidney renal cell carcinoma s/p what appears to have been ablation. No systemic therapy.   2019 - recurrent colon cancer (apparently localized) s/p resection, chemotherapy (6 month regimen, FOLFOX?)    Now presents with acute sob, pleuritic pain, chest pain, dubose. Testing here demonstrated pericardial effusion. Underwent pericardiocentesis, fluid sent for cytology. Imaging showed a     PAST MEDICAL & SURGICAL HISTORY:  HTN (hypertension)    Neuropathy    FAMILY HISTORY:  FHx: stomach cancer (Father)    Social History:  No cigarette, alcohol or illicit drug use. Visiting from [place], staying with daughter (11 Dec 2021 20:23)    MEDICATIONS  (STANDING):  amLODIPine   Tablet 5 milliGRAM(s) Oral daily  carvedilol 6.25 milliGRAM(s) Oral every 12 hours  colchicine 0.6 milliGRAM(s) Oral two times a day  gabapentin 600 milliGRAM(s) Oral two times a day  hydrALAZINE 50 milliGRAM(s) Oral three times a day  influenza  Vaccine (HIGH DOSE) 0.7 milliLiter(s) IntraMuscular once  pantoprazole    Tablet 40 milliGRAM(s) Oral before breakfast  tamsulosin 0.4 milliGRAM(s) Oral at bedtime    MEDICATIONS  (PRN):  ALBUTerol    90 MICROgram(s) HFA Inhaler 2 Puff(s) Inhalation every 6 hours PRN Shortness of Breath and/or Wheezing    Vital Signs Last 24 Hrs  T(C): 37.2 (14 Dec 2021 04:31), Max: 37.2 (14 Dec 2021 04:31)  T(F): 98.9 (14 Dec 2021 04:31), Max: 98.9 (14 Dec 2021 04:31)  HR: 70 (14 Dec 2021 04:31) (49 - 70)  BP: 147/61 (14 Dec 2021 04:31) (107/65 - 189/90)  BP(mean): 109 (13 Dec 2021 19:19) (109 - 109)  RR: 19 (14 Dec 2021 04:31) (15 - 19)  SpO2: 92% (14 Dec 2021 04:31) (92% - 98%)  on O2 NC  ctab  abd soft, nd, nt  a/o x 3  rrr    Labs:                          12.5   11.49 )-----------( 355      ( 14 Dec 2021 07:31 )             37.1     12-14    135  |  100  |  20.0  ----------------------------<  116<H>  4.7   |  25.0  |  1.03    Ca    8.0<L>      14 Dec 2021 07:31  Phos  2.5     12-14  Mg     2.0     12-14           76 y/o M  Onc history (treatment in [place])  2012- diagnosed with colon cancer s/p resection only  2014 - recurrent colon cancer s/p resection only again  Both presumably early stage.  2014 - right kidney renal cell carcinoma s/p what appears to have been ablation. No systemic therapy.   2019 - recurrent colon cancer (apparently localized) s/p resection, chemotherapy (6 month regimen, FOLFOX?)  Also got covid pneumonia 08/2021. Mostly recovered.     Now presents with acute sob, pleuritic pain, chest pain, dubose. Testing here demonstrated pericardial effusion. Underwent pericardiocentesis, fluid (bloody) sent for cytology. Imaging showed a moderately large pericardial effusion and an indeterminate right renal lesion. No other suspicious sites of disease. Currently, he is feeling a little better since the fluid was drained but he is still sob and gets tired on exertion.     PAST MEDICAL & SURGICAL HISTORY:  HTN (hypertension)    Neuropathy    FAMILY HISTORY:  FHx: stomach cancer (Father)    Social History:  No cigarette, alcohol or illicit drug use. Visiting from [place], staying with daughter (11 Dec 2021 20:23)    MEDICATIONS  (STANDING):  amLODIPine   Tablet 5 milliGRAM(s) Oral daily  carvedilol 6.25 milliGRAM(s) Oral every 12 hours  colchicine 0.6 milliGRAM(s) Oral two times a day  gabapentin 600 milliGRAM(s) Oral two times a day  hydrALAZINE 50 milliGRAM(s) Oral three times a day  influenza  Vaccine (HIGH DOSE) 0.7 milliLiter(s) IntraMuscular once  pantoprazole    Tablet 40 milliGRAM(s) Oral before breakfast  tamsulosin 0.4 milliGRAM(s) Oral at bedtime    MEDICATIONS  (PRN):  ALBUTerol    90 MICROgram(s) HFA Inhaler 2 Puff(s) Inhalation every 6 hours PRN Shortness of Breath and/or Wheezing    Vital Signs Last 24 Hrs  T(C): 37.2 (14 Dec 2021 04:31), Max: 37.2 (14 Dec 2021 04:31)  T(F): 98.9 (14 Dec 2021 04:31), Max: 98.9 (14 Dec 2021 04:31)  HR: 70 (14 Dec 2021 04:31) (49 - 70)  BP: 147/61 (14 Dec 2021 04:31) (107/65 - 189/90)  BP(mean): 109 (13 Dec 2021 19:19) (109 - 109)  RR: 19 (14 Dec 2021 04:31) (15 - 19)  SpO2: 92% (14 Dec 2021 04:31) (92% - 98%)  on O2 NC  ctab  abd soft, nd, nt  a/o x 3  rrr    Labs:                          12.5   11.49 )-----------( 355      ( 14 Dec 2021 07:31 )             37.1     12-14    135  |  100  |  20.0  ----------------------------<  116<H>  4.7   |  25.0  |  1.03    Ca    8.0<L>      14 Dec 2021 07:31  Phos  2.5     12-14  Mg     2.0     12-14

## 2021-12-14 NOTE — CONSULT NOTE ADULT - ASSESSMENT
76 y/o gentleman with PMH of kidney ca and colon ca s/p resection, HTN, came to the ED complaining of pleuritic chest pain. He was found to have a moderate-large pericardial effusion and underwent pericardiocentesis 12/13/21 and blood fluid drained. There is suspicion for this being a maligant pericardial effusion.  In this setting he has had brief episodes of narrow complex and wide complex tachycardia, likely a reaction to pericardial inflammation and active medical illness.     He is high risk for bleeding complications on anticoagulation given his presentation with bloody pericardial effusion (as well as hematuria). Given this, and just very brief episodes of AT/AF noted (<30 seconds, does not make criteria for true AF), would not start anticoagulation at this time.   Would continue beta blockade as tolerated.   no further intervention other than above for brief NSVT as well (not seen today) given preserved LV function.     would repeat monitoring as outpt and reevaluate for true AF after this acute medical illness.     EP will signoff. please reconsult as needed.

## 2021-12-14 NOTE — CONSULT NOTE ADULT - SUBJECTIVE AND OBJECTIVE BOX
Subjective - patient seen and evaluated bedside. Sitting comfortably in bed. Admits to SOB. Denies CP, HA, dizziness, n/v/d    Review of Systems: negative x 10 systems except as noted above    Brief summary:  76 y/o M with PMH of kidney ca and colon ca s/p resection, HTN, came to the ED complaining of pleuritic chest pain. He was found to have a moderate-large pericardial effusion and underwent pericardiocentesis 12/13/21 and blood fluid drained. There is suspicion for this being a maligant pericardial effusion. On subsequent TTE, patient still w/ moderate sized pericardial effusion per Dr. Burch. Thoracic surgery consulted for pericardial window.       PAST MEDICAL & SURGICAL HISTORY:  HTN (hypertension)    Neuropathy          ALBUTerol    90 MICROgram(s) HFA Inhaler 2 Puff(s) Inhalation every 6 hours PRN  amLODIPine   Tablet 5 milliGRAM(s) Oral daily  carvedilol 6.25 milliGRAM(s) Oral every 12 hours  gabapentin 600 milliGRAM(s) Oral two times a day  hydrALAZINE 50 milliGRAM(s) Oral three times a day  influenza  Vaccine (HIGH DOSE) 0.7 milliLiter(s) IntraMuscular once  pantoprazole    Tablet 40 milliGRAM(s) Oral before breakfast  tamsulosin 0.4 milliGRAM(s) Oral at bedtime  MEDICATIONS  (PRN):  ALBUTerol    90 MICROgram(s) HFA Inhaler 2 Puff(s) Inhalation every 6 hours PRN Shortness of Breath and/or Wheezing      Daily     Daily                               12.5   11.49 )-----------( 355      ( 14 Dec 2021 07:31 )             37.1   12-14    135  |  100  |  20.0  ----------------------------<  116<H>  4.7   |  25.0  |  1.03    Ca    8.0<L>      14 Dec 2021 07:31  Phos  2.5     12-14  Mg     2.0     12-14              Objective:  T(C): 37.2 (12-14-21 @ 16:09), Max: 37.2 (12-14-21 @ 04:31)  HR: 57 (12-14-21 @ 16:09) (57 - 70)  BP: 155/60 (12-14-21 @ 16:09) (147/61 - 169/75)  RR: 18 (12-14-21 @ 16:09) (16 - 19)  SpO2: 97% (12-14-21 @ 16:09) (92% - 98%)  Wt(kg): --CAPILLARY BLOOD GLUCOSE      I&O's Summary      Physical Exam  Neuro: A+O x 3, non-focal, speech clear and intact  Pulm: CTA, equal bilaterally  CV: RRR, +S1S2  Abd: soft, NT, ND, +BS  Ext: +DP Pulses b/l, , no edema      Imaging:  CXR:      < from: CT Abdomen and Pelvis w/ IV Cont (12.11.21 @ 16:34) >  IMPRESSION:  No pulmonary embolus.  Enlarged prostate.  Moderately large pericardial effusion with evidence of right-sided   congestive heart failure.  Indeterminant right renal lesion. Suggest elective ultrasound.    < end of copied text >  < from: TTE Echo Complete w/o Contrast w/ Doppler (12.12.21 @ 08:47) >  Summary:   1. Left ventricular ejection fraction, by visual estimation, is 55 to   60%.   2. Normal global left ventricular systolic function.   3. LV Ejection Fraction by Prince's Method with a biplane EF of 57 %.   4. Spectral Doppler shows impaired relaxation pattern of left   ventricular myocardial filling (Grade I diastolic dysfunction).   5. There is moderate concentric left ventricular hypertrophy.   6. Mildly enlarged left atrium.   7. Moderate pericardial effusion.   8. Mild mitral annular calcification.    < end of copied text >  < from: TTE Echo Limited or F/U (12.13.21 @ 09:14) >  Summary:   1. Technically limited study.   2. Pre Pericardiocentesis: Small pericardial effusion.The pericardial   effusion is anterior to the right ventricle.   3. Status post pericardiocentesis.   4. Trivial pericardial effusion seen post procedure.   5. Compared to prior imaging on 12/12/2021, there is now trivial   pericardial effusion.    < end of copied text >

## 2021-12-14 NOTE — PROGRESS NOTE ADULT - ASSESSMENT
74 y/o female with PMH of kidney ca and colon ca s/p resection, HTN, came to the ED complaining of pleuritic chest pain. Patient said 7-8 days ago, he had a chest pressure on the left that woke him up from sleep, lasted few minutes and resolved without intervention. After started having chest pain with breathing, radiating to the back associated with shortness of breath. He also endorsed fever started 3 days ago. Of note, he has been having hematuria x 6 days. CT chest/abdomen/pelvis with contrast: No pulmonary embolus. Enlarged prostate. Moderately large pericardial effusion with evidence of right-sided congestive heart failure.    Moderate Pericardial effusion   unclear etiology, possibly secondary to malignancy  inflammatory markers elevated  CT chest with moderate effusion and signs of right sided congestive failure  TTE - moderate effusion and preserved EF  s/p pericardiocentesis on 12/13 with 450 cc of fluid removed  repeat limited TTE today with moderate effusion; will need a pericardial window. CT surgery consulted   Started on colchicine and indomethacin but given concerns for malignancy these have been discontinued  f/u pericardial fluid studies; including culture and cytology  Cardio recommendations appreciated    Atrial Fibrillation  -rate controlled  -tele reviewed  -continue coreg  -not a candidate for AC given bloody pericardial effusion   -cardio recs appreciated    Hypertensive urgency   BP better controlled  Continue Hydralazine, coreg and norvasc  Monitor BP closely   Low sodium diet    LINDEN on CKD Stage 2 - resolved  Likely medication related vs prerenal azotemia  Creatinine improved s/p IV hydration  s/p cardiac cath   UA noted; f/u urine culture  continue to hold ACE and NSAIDS  CT with renal lesion; renal/bladder US pending  Strict I/Os, daily weights  Monitor renal function closely    BPH  CT reviewed  continue Flomax      Neuropathy   Continue Gabapentin BID    NSVT  keep K > 4 and Mag > 2  KCL ordered  continue coreg  s/p cath with mild CAD  continue telemonitoring    History of Colon CA s/p resection and Renal CA  -CT c/a/p reviewed; renal lesion noted which was not seen on sono  -given bloody pericardial effusion; high suspicion for malignancy  -hem/onc consult appreciated - outpatient follo wup    DVT prophylaxis: SCDs given bloody pericardial effusion    Dispo - CT surgery consult for pericardial window.    Plan discussed with patient, Dr. Burch, medicine PA, RN   74 y/o female with PMH of kidney ca and colon ca s/p resection, HTN, came to the ED complaining of pleuritic chest pain. Patient said 7-8 days ago, he had a chest pressure on the left that woke him up from sleep, lasted few minutes and resolved without intervention. After started having chest pain with breathing, radiating to the back associated with shortness of breath. He also endorsed fever started 3 days ago. Of note, he has been having hematuria x 6 days. CT chest/abdomen/pelvis with contrast: No pulmonary embolus. Enlarged prostate. Moderately large pericardial effusion with evidence of right-sided congestive heart failure.    Moderate Pericardial effusion   unclear etiology, possibly secondary to malignancy  inflammatory markers elevated  CT chest with moderate effusion and signs of right sided congestive failure  TTE - moderate effusion and preserved EF  s/p pericardiocentesis on 12/13 with 450 cc of fluid removed  repeat limited TTE today with rapid reaccumulation of fluid with moderate effusion; will need a pericardial window. CT surgery consulted. Brief episode of tachycardia, given concerns for pending hemodynamic compromise ICU consulted. Transfer to SDU for q2 vital checks.  Started on colchicine and indomethacin but given concerns for malignancy have been discontinued  f/u pericardial fluid studies; including culture and cytology  Cardio recommendations appreciated    Atrial Fibrillation  -tele reviewed, brief episode  -continue coreg  -not a candidate for AC given bloody pericardial effusion   -EP recommendations appreciated; outpatient evaluation for true AF after acute medical illness has resolved    Hypertensive urgency   BP better controlled  Continue Hydralazine, coreg and norvasc  Monitor BP closely   Low sodium diet    LINDEN on CKD Stage 2 - resolved  Likely prerenal azotemia   Creatinine improved s/p IV hydration  s/p cardiac cath   UA noted; f/u urine culture  continue to hold ACE and NSAIDS  CT with renal lesion; renal/bladder US without hydro  Strict I/Os, daily weights  Monitor renal function closely    BPH  CT reviewed  continue Flomax      Neuropathy   Continue Gabapentin BID    NSVT  keep K > 4 and Mag > 2  continue coreg  s/p cath with mild CAD  continue telemonitoring    History of Colon CA s/p resection and Renal CA  -CT c/a/p reviewed; renal lesion noted which was not seen on sono  -given bloody pericardial effusion; high suspicion for malignancy  -hem/onc consult appreciated - outpatient follow up    DVT prophylaxis: SCDs given bloody pericardial effusion    Dispo - Transfer to SDU for q2 vitals given rapid reaccumulation of pericardial fluid. ICU consulted for closer hemodynamic monitoring. Tentative plans for pericardial window on 12/16.    Plan discussed with patient, Dr. Burch, CT surgery PA, medicine PA, RN

## 2021-12-14 NOTE — CONSULT NOTE ADULT - ASSESSMENT
76 y/o M with PMH of kidney ca and colon ca s/p resection, HTN, came to the ED complaining of pleuritic chest pain. He was found to have a moderate-large pericardial effusion and underwent pericardiocentesis 12/13/21 and blood fluid drained. There is suspicion for this being a maligant pericardial effusion. On subsequent TTE, patient still w/ moderate sized pericardial effusion per Dr. Burch. Thoracic surgery consulted for pericardial window.

## 2021-12-14 NOTE — CONSULT NOTE ADULT - ASSESSMENT
74 yo male hx of Kidney CA, Colon CA presented with pleuritic chest pain, found to have pericardial effusion. Effusion has been drained by pericardiocentesis, patient is scheduled for pericardial window on 12/14/21. Patient has remained stable on the floor, BP, HR stable. No signs of impending cardiac tamponade on physical exam. Patient does not need ICU at this time. Please call us back if patient's clinical status changes.

## 2021-12-14 NOTE — CONSULT NOTE ADULT - ASSESSMENT
Renal mass - has prior history of renal cell carcinoma s/p radioablation.     h/o colon cancer - presumably in remission. Has residual neuropathy from chemotherapy.    Pericardial effusion - possibly malignant versus inflammatory process. Fluid sent for cytology. CT surgery on board.     Thank you for the kind consultation.         Mau Graham MD  [facility] Renal mass - has prior history of renal cell carcinoma s/p radioablation. Abd US ordered to better assess the lesion that was indeterminate on CT. Possibly recurrence.     h/o colon cancer - presumably in remission. Has residual neuropathy from chemotherapy.    Pericardial effusion - possibly malignant versus inflammatory process. Fluid sent for cytology. Cardiology on board.     He agreed to follow up with me as an outpatient for continued oncology care.      Thank you for the kind consultation.         Mau Graham MD  [facility]   New Patient line [phone]

## 2021-12-15 PROBLEM — I10 ESSENTIAL (PRIMARY) HYPERTENSION: Chronic | Status: ACTIVE | Noted: 2021-12-12

## 2021-12-15 PROBLEM — G62.9 POLYNEUROPATHY, UNSPECIFIED: Chronic | Status: ACTIVE | Noted: 2021-12-12

## 2021-12-15 LAB
ANION GAP SERPL CALC-SCNC: 12 MMOL/L — SIGNIFICANT CHANGE UP (ref 5–17)
APTT BLD: 34 SEC — SIGNIFICANT CHANGE UP (ref 27.5–35.5)
BUN SERPL-MCNC: 28.4 MG/DL — HIGH (ref 8–20)
CALCIUM SERPL-MCNC: 8 MG/DL — LOW (ref 8.6–10.2)
CHLORIDE SERPL-SCNC: 99 MMOL/L — SIGNIFICANT CHANGE UP (ref 98–107)
CO2 SERPL-SCNC: 25 MMOL/L — SIGNIFICANT CHANGE UP (ref 22–29)
CREAT SERPL-MCNC: 1.23 MG/DL — SIGNIFICANT CHANGE UP (ref 0.5–1.3)
GLUCOSE SERPL-MCNC: 93 MG/DL — SIGNIFICANT CHANGE UP (ref 70–99)
INR BLD: 1.36 RATIO — HIGH (ref 0.88–1.16)
MAGNESIUM SERPL-MCNC: 2.2 MG/DL — SIGNIFICANT CHANGE UP (ref 1.6–2.6)
NON-GYNECOLOGICAL CYTOLOGY STUDY: SIGNIFICANT CHANGE UP
POTASSIUM SERPL-MCNC: 4.4 MMOL/L — SIGNIFICANT CHANGE UP (ref 3.5–5.3)
POTASSIUM SERPL-SCNC: 4.4 MMOL/L — SIGNIFICANT CHANGE UP (ref 3.5–5.3)
PROTHROM AB SERPL-ACNC: 15.6 SEC — HIGH (ref 10.6–13.6)
SARS-COV-2 RNA SPEC QL NAA+PROBE: SIGNIFICANT CHANGE UP
SODIUM SERPL-SCNC: 136 MMOL/L — SIGNIFICANT CHANGE UP (ref 135–145)

## 2021-12-15 PROCEDURE — 93010 ELECTROCARDIOGRAM REPORT: CPT

## 2021-12-15 PROCEDURE — 99232 SBSQ HOSP IP/OBS MODERATE 35: CPT

## 2021-12-15 PROCEDURE — 99231 SBSQ HOSP IP/OBS SF/LOW 25: CPT

## 2021-12-15 RX ORDER — CARVEDILOL PHOSPHATE 80 MG/1
6.25 CAPSULE, EXTENDED RELEASE ORAL EVERY 12 HOURS
Refills: 0 | Status: DISCONTINUED | OUTPATIENT
Start: 2021-12-15 | End: 2021-12-17

## 2021-12-15 RX ORDER — CEFAZOLIN SODIUM 1 G
2000 VIAL (EA) INJECTION ONCE
Refills: 0 | Status: COMPLETED | OUTPATIENT
Start: 2021-12-16 | End: 2021-12-16

## 2021-12-15 RX ORDER — ACETAMINOPHEN 500 MG
650 TABLET ORAL EVERY 6 HOURS
Refills: 0 | Status: DISCONTINUED | OUTPATIENT
Start: 2021-12-15 | End: 2021-12-17

## 2021-12-15 RX ORDER — LANOLIN ALCOHOL/MO/W.PET/CERES
3 CREAM (GRAM) TOPICAL ONCE
Refills: 0 | Status: COMPLETED | OUTPATIENT
Start: 2021-12-15 | End: 2021-12-15

## 2021-12-15 RX ADMIN — PANTOPRAZOLE SODIUM 40 MILLIGRAM(S): 20 TABLET, DELAYED RELEASE ORAL at 05:38

## 2021-12-15 RX ADMIN — CARVEDILOL PHOSPHATE 6.25 MILLIGRAM(S): 80 CAPSULE, EXTENDED RELEASE ORAL at 19:01

## 2021-12-15 RX ADMIN — GABAPENTIN 600 MILLIGRAM(S): 400 CAPSULE ORAL at 19:01

## 2021-12-15 RX ADMIN — AMLODIPINE BESYLATE 5 MILLIGRAM(S): 2.5 TABLET ORAL at 05:38

## 2021-12-15 RX ADMIN — GABAPENTIN 600 MILLIGRAM(S): 400 CAPSULE ORAL at 05:38

## 2021-12-15 RX ADMIN — Medication 50 MILLIGRAM(S): at 21:41

## 2021-12-15 RX ADMIN — CARVEDILOL PHOSPHATE 6.25 MILLIGRAM(S): 80 CAPSULE, EXTENDED RELEASE ORAL at 05:38

## 2021-12-15 RX ADMIN — TAMSULOSIN HYDROCHLORIDE 0.4 MILLIGRAM(S): 0.4 CAPSULE ORAL at 21:41

## 2021-12-15 RX ADMIN — Medication 50 MILLIGRAM(S): at 05:38

## 2021-12-15 RX ADMIN — Medication 3 MILLIGRAM(S): at 21:41

## 2021-12-15 NOTE — PROGRESS NOTE ADULT - ASSESSMENT
74 y/o gentleman with PMH of kidney ca and colon ca s/p resection, HTN, came to the ED complaining of pleuritic chest pain. He was found to have a moderate-large pericardial effusion and underwent pericardiocentesis 12/13/21 and blood fluid drained. There is suspicion for this being a maligant pericardial effusion.  In this setting he has had brief episodes of narrow complex and wide complex tachycardia, likely a reaction to pericardial inflammation and active medical illness.     Was seen by Dr. Tomas YESTERDAY for nonsustained atrial arrhythmia and NSVT, preserved EF. Recommended to c/w coreg and no AC and o/p f/u and monitoring. He was deemed at high risk for bleeding complications on anticoagulation given his presentation with bloody pericardial effusion (as well as hematuria).     Interval Hx 12/14-12/15:  Had AF with RVR (130-150bpm). Also conversion pause 4.17 seconds and brief SB. All asymptomatic. Awaiting pericardial window tomorrow. Coreg held    recommend to c/w coreg at same dose. Bed rest. The AF with RVR maybe triggered by the pericardial effusion and may improve after drainage. Cannot start AC yet and this to be r/a later. Conversion pause is only 4.1 seconds and is asymptomatic and does not indicate PPM. If continued to have TBS, may need PPM support if within GOC.     Will follow up  above d/w pt and Dr. Willingham

## 2021-12-15 NOTE — PROGRESS NOTE ADULT - SUBJECTIVE AND OBJECTIVE BOX
ONEIDA OCHOA    [id number]    75y      Male      INTERVAL HPI/OVERNIGHT EVENTS: patientb eigiovanny seen for pericardial effusion. patient seen at bedside with phone .     patient states feeling ok    as per nurse, patient had 4 sec pause    REVIEW OF SYSTEMS:    CONSTITUTIONAL: No fever, weight loss, or fatigue  RESPIRATORY: No cough, wheezing, hemoptysis; No shortness of breath  CARDIOVASCULAR: No chest pain, palpitations  GASTROINTESTINAL: No abdominal or epigastric pain. No nausea, vomiting  NEUROLOGICAL: No headaches, memory loss, loss of strength.  MISCELLANEOUS:      Vital Signs Last 24 Hrs  T(C): 36.7 (16 Dec 2021 06:36), Max: 36.8 (16 Dec 2021 04:30)  T(F): 98.1 (16 Dec 2021 06:36), Max: 98.2 (16 Dec 2021 04:30)  HR: 60 (16 Dec 2021 08:00) (50 - 88)  BP: 132/62 (16 Dec 2021 08:00) (105/45 - 149/74)  BP(mean): 86 (16 Dec 2021 08:00) (65 - 86)  RR: 15 (16 Dec 2021 08:00) (15 - 18)  SpO2: 94% (16 Dec 2021 08:00) (93% - 99%)    PHYSICAL EXAM:    GENERAL: elderly male, laying in bed, NAD  HEAD:  Atraumatic, Normocephalic  EYES: EOMI, PERRLA, conjunctiva and sclera clear  ENMT: Moist mucous membranes  NECK: Supple   NERVOUS SYSTEM:  Alert & Oriented X3   CHEST/LUNG: bilateral air entry, coarse  HEART: Regular rate and rhythm; + S1/S2  ABDOMEN: Soft, Nontender, Nondistended; Bowel sounds present  EXTREMITIES:  no pedal edema      LABS:                        10.9   9.10  )-----------( 330      ( 16 Dec 2021 04:50 )             33.1     12-16    136  |  99  |  30.7<H>  ----------------------------<  106<H>  4.4   |  25.0  |  1.19    Ca    7.9<L>      16 Dec 2021 04:50  Mg     2.4     12-16    TPro  6.3<L>  /  Alb  2.7<L>  /  TBili  0.4  /  DBili  x   /  AST  29  /  ALT  30  /  AlkPhos  270<H>  12-16    PT/INR - ( 15 Dec 2021 16:45 )   PT: 15.6 sec;   INR: 1.36 ratio         PTT - ( 15 Dec 2021 16:45 )  PTT:34.0 sec        MEDICATIONS  (STANDING):  amLODIPine   Tablet 5 milliGRAM(s) Oral daily  carvedilol 6.25 milliGRAM(s) Oral every 12 hours  ceFAZolin   IVPB 2000 milliGRAM(s) IV Intermittent once  gabapentin 600 milliGRAM(s) Oral two times a day  hydrALAZINE 50 milliGRAM(s) Oral three times a day  influenza  Vaccine (HIGH DOSE) 0.7 milliLiter(s) IntraMuscular once  pantoprazole    Tablet 40 milliGRAM(s) Oral before breakfast  tamsulosin 0.4 milliGRAM(s) Oral at bedtime    MEDICATIONS  (PRN):  acetaminophen     Tablet .. 650 milliGRAM(s) Oral every 6 hours PRN Temp greater or equal to 38C (100.4F), Mild Pain (1 - 3)  ALBUTerol    90 MICROgram(s) HFA Inhaler 2 Puff(s) Inhalation every 6 hours PRN Shortness of Breath and/or Wheezing      RADIOLOGY & ADDITIONAL TESTS:

## 2021-12-15 NOTE — PROGRESS NOTE ADULT - SUBJECTIVE AND OBJECTIVE BOX
HPI:  74 y/o gentleman with PMH of kidney ca and colon ca s/p resection, HTN, came to the ED complaining of pleuritic chest pain. He was found to have a moderate-large pericardial effusion and underwent pericardiocentesis 12/13/21 and blood fluid drained. There is suspicion for this being a maligant pericardial effusion. He has also had hematuria during this admission.    Early this am on telemetry he had a runs of irregular narrow complex tachycardia lasting 26 seconds (c/w brief pAF). Prior tele has noted brief episodes of NSVT as well.     He denies any prior known history of arrhythmia/AF. He denies syncope. He reports brief palpitation.    Interval Hx 12/14-12/15:  Had AF with RVR (130-150bpm). Also conversion pause 4.17 seconds and brief SB. All asymptomatic. Awaiting pericardial window tomorrow. Coreg held      PAST MEDICAL & SURGICAL HISTORY:  HTN (hypertension)  as above    Neuropathy        REVIEW OF SYSTEMS  brief palpitation. denies dizziness, syncope, fevers. otherwise negative other than as per HPI    Allergic/Immunologic:	    MEDICATIONS  (STANDING):  amLODIPine   Tablet 5 milliGRAM(s) Oral daily  gabapentin 600 milliGRAM(s) Oral two times a day  hydrALAZINE 50 milliGRAM(s) Oral three times a day  influenza  Vaccine (HIGH DOSE) 0.7 milliLiter(s) IntraMuscular once  pantoprazole    Tablet 40 milliGRAM(s) Oral before breakfast  tamsulosin 0.4 milliGRAM(s) Oral at bedtime    MEDICATIONS  (PRN):  acetaminophen     Tablet .. 650 milliGRAM(s) Oral every 6 hours PRN Temp greater or equal to 38C (100.4F), Mild Pain (1 - 3)  ALBUTerol    90 MICROgram(s) HFA Inhaler 2 Puff(s) Inhalation every 6 hours PRN Shortness of Breath and/or Wheezing      Allergies    penicillin (Rash)    Intolerances        SOCIAL HISTORY: n/c. visiting family [place] but planning to stay for medical care    FAMILY HISTORY:  FHx: stomach cancer (Father)        Vital Signs Last 24 Hrs  T(C): 37.1 (15 Dec 2021 08:06), Max: 37.3 (15 Dec 2021 05:00)  T(F): 98.8 (15 Dec 2021 08:06), Max: 99.2 (15 Dec 2021 05:00)  HR: 53 (15 Dec 2021 08:06) (34 - 143)  BP: 103/50 (15 Dec 2021 08:06) (102/73 - 155/60)  BP(mean): 78 (15 Dec 2021 05:00) (78 - 90)  RR: 18 (15 Dec 2021 08:06) (18 - 20)  SpO2: 98% (15 Dec 2021 08:06) (97% - 99%)    Physical Exam:  Constitutional: AAOx3, NAD  Neck: supple, No JVD  Cardiovascular: +S1S2 RRR, no murmurs, rubs, gallops   Pulmonary: CTA b/l, unlabored, no wheezes, rales. rhonci  Abdomen: +BS, soft NTND  Extremities: no edema b/l, +distal pulses b/l  Neuro: non focal, speech clear, ENGLE x 4    LABS:                                   12.5   11.49 )-----------( 355      ( 14 Dec 2021 07:31 )             37.1   12-15    136  |  99  |  28.4<H>  ----------------------------<  93  4.4   |  25.0  |  1.23    Ca    8.0<L>      15 Dec 2021 07:39  Phos  2.5     12-14  Mg     2.2     12-15                  RADIOLOGY & ADDITIONAL STUDIES:  < from: TTE Echo Limited or F/U (12.13.21 @ 09:14) >   1. Technically limited study.   2. Pre Pericardiocentesis: Small pericardial effusion.The pericardial   effusion is anterior to the right ventricle.   3. Status post pericardiocentesis.   4. Trivial pericardial effusion seen post procedure.   5. Compared to prior imaging on 12/12/2021, there is now trivial   pericardial effusion.      < end of copied text >  < from: TTE Echo Complete w/o Contrast w/ Doppler (12.12.21 @ 08:47) >   1. Left ventricular ejection fraction, by visual estimation, is 55 to   60%.   2. Normal global left ventricular systolic function.   3. LV Ejection Fraction by Prince's Method with a biplane EF of 57 %.   4. Spectral Doppler shows impaired relaxation pattern of left   ventricular myocardial filling (Grade I diastolic dysfunction).   5. There is moderate concentric left ventricular hypertrophy.   6. Mildly enlarged left atrium.   7. Moderate pericardial effusion.   8. Mild mitral annular calcification.   9. Trace mitral valve regurgitation.  10. Mild tricuspid regurgitation.  11. Moderate aortic regurgitation.  12. Estimated pulmonary artery systolic pressure is 48.6 mmHg assuming a   right atrial pressure of 3 mmHg, which is consistent with mild pulmonary   hypertension.  13. There is respiratory variation of Mitral inflow >25%, RA diastolic   collapse, suggestive of early tamponade physiology.      < end of copied text >    < from: CT Angio Chest PE Protocol w/ IV Cont (12.11.21 @ 16:33) >  No pulmonary embolus.  Enlarged prostate.  Moderately large pericardial effusion with evidence of right-sided   congestive heart failure.  Indeterminant right renal lesion. Suggest elective ultrasound.      < end of copied text >  ECG 12/15: AF  ECG 12/11/21 sinus rhythm with frequent atrial ectopy, HR 82 bpm, narrow QRS

## 2021-12-15 NOTE — PROGRESS NOTE ADULT - ASSESSMENT
76 y/o female with PMH of kidney ca and colon ca s/p resection, HTN, came to the ED complaining of pleuritic chest pain. Patient said 7-8 days ago, he had a chest pressure on the left that woke him up from sleep, lasted few minutes and resolved without intervention. After started having chest pain with breathing, radiating to the back associated with shortness of breath. He also endorsed fever started 3 days ago. Of note, he has been having hematuria x 6 days. CT chest/abdomen/pelvis with contrast: No pulmonary embolus. Enlarged prostate. Moderately large pericardial effusion with evidence of right-sided congestive heart failure.    Moderate Pericardial effusion   CT chest with moderate effusion and signs of right sided congestive failure  TTE - moderate effusion and preserved EF  s/p pericardiocentesis on 12/13 with 450 cc of fluid removed  repeat limited TTE today with rapid reaccumulation of fluid with moderate effusion; will need a pericardial window. CT surgery followign  - npo pmn   Started on colchicine and indomethacin but given concerns for malignancy have been discontinued  f/u pericardial fluid studies; including culture and cytology    sinus pause - ep following  - ok to resume coreg with holding parameters    Atrial Fibrillation  -continue coreg  -not a candidate for AC given bloody pericardial effusion   -EP recommendations appreciated;    Hypertensive urgency   BP better controlled  Continue Hydralazine, coreg and norvasc    LINDEN on CKD Stage 2 - resolved  Likely prerenal azotemia   s/p cardiac cath   UA noted; f/u urine culture  continue to hold ACE and NSAIDS  CT with renal lesion; renal/bladder US without hydro  Strict I/Os, daily weights  Monitor renal function closely    BPH  continue Flomax      Neuropathy   Continue Gabapentin BID    NSVT  continue coreg  s/p cath with mild CAD    History of Colon CA s/p resection and Renal CA  -CT c/a/p reviewed; renal lesion noted which was not seen on sono  -given bloody pericardial effusion; high suspicion for malignancy  -hem/onc consult appreciated - outpatient follow up    DVT prophylaxis: SCDs given bloody pericardial effusion    dispo - npo pmn

## 2021-12-16 ENCOUNTER — TRANSCRIPTION ENCOUNTER (OUTPATIENT)
Age: 76
End: 2021-12-16

## 2021-12-16 LAB
ALBUMIN SERPL ELPH-MCNC: 2.7 G/DL — LOW (ref 3.3–5.2)
ALP SERPL-CCNC: 270 U/L — HIGH (ref 40–120)
ALT FLD-CCNC: 30 U/L — SIGNIFICANT CHANGE UP
ANION GAP SERPL CALC-SCNC: 12 MMOL/L — SIGNIFICANT CHANGE UP (ref 5–17)
AST SERPL-CCNC: 29 U/L — SIGNIFICANT CHANGE UP
BASOPHILS # BLD AUTO: 0.02 K/UL — SIGNIFICANT CHANGE UP (ref 0–0.2)
BASOPHILS NFR BLD AUTO: 0.2 % — SIGNIFICANT CHANGE UP (ref 0–2)
BILIRUB SERPL-MCNC: 0.4 MG/DL — SIGNIFICANT CHANGE UP (ref 0.4–2)
BLD GP AB SCN SERPL QL: SIGNIFICANT CHANGE UP
BUN SERPL-MCNC: 30.7 MG/DL — HIGH (ref 8–20)
CALCIUM SERPL-MCNC: 7.9 MG/DL — LOW (ref 8.6–10.2)
CHLORIDE SERPL-SCNC: 99 MMOL/L — SIGNIFICANT CHANGE UP (ref 98–107)
CO2 SERPL-SCNC: 25 MMOL/L — SIGNIFICANT CHANGE UP (ref 22–29)
CREAT SERPL-MCNC: 1.19 MG/DL — SIGNIFICANT CHANGE UP (ref 0.5–1.3)
EOSINOPHIL # BLD AUTO: 0.17 K/UL — SIGNIFICANT CHANGE UP (ref 0–0.5)
EOSINOPHIL NFR BLD AUTO: 1.9 % — SIGNIFICANT CHANGE UP (ref 0–6)
GLUCOSE SERPL-MCNC: 106 MG/DL — HIGH (ref 70–99)
HCT VFR BLD CALC: 33.1 % — LOW (ref 39–50)
HGB BLD-MCNC: 10.9 G/DL — LOW (ref 13–17)
IMM GRANULOCYTES NFR BLD AUTO: 0.7 % — SIGNIFICANT CHANGE UP (ref 0–1.5)
LYMPHOCYTES # BLD AUTO: 0.78 K/UL — LOW (ref 1–3.3)
LYMPHOCYTES # BLD AUTO: 8.6 % — LOW (ref 13–44)
MAGNESIUM SERPL-MCNC: 2.4 MG/DL — SIGNIFICANT CHANGE UP (ref 1.6–2.6)
MCHC RBC-ENTMCNC: 30 PG — SIGNIFICANT CHANGE UP (ref 27–34)
MCHC RBC-ENTMCNC: 32.9 GM/DL — SIGNIFICANT CHANGE UP (ref 32–36)
MCV RBC AUTO: 91.2 FL — SIGNIFICANT CHANGE UP (ref 80–100)
MONOCYTES # BLD AUTO: 0.93 K/UL — HIGH (ref 0–0.9)
MONOCYTES NFR BLD AUTO: 10.2 % — SIGNIFICANT CHANGE UP (ref 2–14)
NEUTROPHILS # BLD AUTO: 7.14 K/UL — SIGNIFICANT CHANGE UP (ref 1.8–7.4)
NEUTROPHILS NFR BLD AUTO: 78.4 % — HIGH (ref 43–77)
PLATELET # BLD AUTO: 330 K/UL — SIGNIFICANT CHANGE UP (ref 150–400)
POTASSIUM SERPL-MCNC: 4.4 MMOL/L — SIGNIFICANT CHANGE UP (ref 3.5–5.3)
POTASSIUM SERPL-SCNC: 4.4 MMOL/L — SIGNIFICANT CHANGE UP (ref 3.5–5.3)
PROT SERPL-MCNC: 6.3 G/DL — LOW (ref 6.6–8.7)
RBC # BLD: 3.63 M/UL — LOW (ref 4.2–5.8)
RBC # FLD: 12.3 % — SIGNIFICANT CHANGE UP (ref 10.3–14.5)
SODIUM SERPL-SCNC: 136 MMOL/L — SIGNIFICANT CHANGE UP (ref 135–145)
WBC # BLD: 9.1 K/UL — SIGNIFICANT CHANGE UP (ref 3.8–10.5)
WBC # FLD AUTO: 9.1 K/UL — SIGNIFICANT CHANGE UP (ref 3.8–10.5)

## 2021-12-16 PROCEDURE — 99231 SBSQ HOSP IP/OBS SF/LOW 25: CPT

## 2021-12-16 PROCEDURE — 99232 SBSQ HOSP IP/OBS MODERATE 35: CPT

## 2021-12-16 PROCEDURE — 93010 ELECTROCARDIOGRAM REPORT: CPT

## 2021-12-16 RX ADMIN — Medication 50 MILLIGRAM(S): at 21:17

## 2021-12-16 RX ADMIN — GABAPENTIN 600 MILLIGRAM(S): 400 CAPSULE ORAL at 06:41

## 2021-12-16 RX ADMIN — CARVEDILOL PHOSPHATE 6.25 MILLIGRAM(S): 80 CAPSULE, EXTENDED RELEASE ORAL at 17:03

## 2021-12-16 RX ADMIN — GABAPENTIN 600 MILLIGRAM(S): 400 CAPSULE ORAL at 17:03

## 2021-12-16 RX ADMIN — PANTOPRAZOLE SODIUM 40 MILLIGRAM(S): 20 TABLET, DELAYED RELEASE ORAL at 06:40

## 2021-12-16 RX ADMIN — Medication 50 MILLIGRAM(S): at 06:39

## 2021-12-16 RX ADMIN — Medication 50 MILLIGRAM(S): at 14:03

## 2021-12-16 RX ADMIN — TAMSULOSIN HYDROCHLORIDE 0.4 MILLIGRAM(S): 0.4 CAPSULE ORAL at 21:17

## 2021-12-16 NOTE — PROGRESS NOTE ADULT - SUBJECTIVE AND OBJECTIVE BOX
ONEIDA OCHOA    [id number]    75y      Male    INTERVAL HPI/OVERNIGHT EVENTS: patient seen at bedside and states breathing is improved     thoracic procedure postponed until tomorrow     REVIEW OF SYSTEMS:    CONSTITUTIONAL: No fever, weight loss, or fatigue  RESPIRATORY: No cough, wheezing, hemoptysis; No shortness of breath  CARDIOVASCULAR: No chest pain, palpitations  GASTROINTESTINAL: No abdominal or epigastric pain. No nausea, vomiting  NEUROLOGICAL: No headaches, memory loss, loss of strength.  MISCELLANEOUS:      Vital Signs Last 24 Hrs  T(C): 37.2 (16 Dec 2021 16:52), Max: 37.2 (16 Dec 2021 16:52)  T(F): 98.9 (16 Dec 2021 16:52), Max: 98.9 (16 Dec 2021 16:52)  HR: 60 (16 Dec 2021 16:52) (50 - 88)  BP: 156/60 (16 Dec 2021 16:52) (105/45 - 177/75)  BP(mean): 92 (16 Dec 2021 15:36) (65 - 109)  RR: 16 (16 Dec 2021 16:52) (13 - 18)  SpO2: 98% (16 Dec 2021 16:52) (93% - 99%)    PHYSICAL EXAM:    GENERAL: elderly male, laying in bed, NAD  HEAD:  Atraumatic, Normocephalic  EYES: EOMI, PERRLA, conjunctiva and sclera clear  ENMT: Moist mucous membranes  NECK: Supple   NERVOUS SYSTEM:  Alert & Oriented X3   CHEST/LUNG: bilateral air entry, coarse  HEART: Regular rate and rhythm; + S1/S2  ABDOMEN: Soft, Nontender, Nondistended; Bowel sounds present  EXTREMITIES:  no pedal edema      LABS:                        10.9   9.10  )-----------( 330      ( 16 Dec 2021 04:50 )             33.1     12-16    136  |  99  |  30.7<H>  ----------------------------<  106<H>  4.4   |  25.0  |  1.19    Ca    7.9<L>      16 Dec 2021 04:50  Mg     2.4     12-16    TPro  6.3<L>  /  Alb  2.7<L>  /  TBili  0.4  /  DBili  x   /  AST  29  /  ALT  30  /  AlkPhos  270<H>  12-16    PT/INR - ( 15 Dec 2021 16:45 )   PT: 15.6 sec;   INR: 1.36 ratio         PTT - ( 15 Dec 2021 16:45 )  PTT:34.0 sec        MEDICATIONS  (STANDING):  amLODIPine   Tablet 5 milliGRAM(s) Oral daily  carvedilol 6.25 milliGRAM(s) Oral every 12 hours  gabapentin 600 milliGRAM(s) Oral two times a day  hydrALAZINE 50 milliGRAM(s) Oral three times a day  influenza  Vaccine (HIGH DOSE) 0.7 milliLiter(s) IntraMuscular once  pantoprazole    Tablet 40 milliGRAM(s) Oral before breakfast  tamsulosin 0.4 milliGRAM(s) Oral at bedtime    MEDICATIONS  (PRN):  acetaminophen     Tablet .. 650 milliGRAM(s) Oral every 6 hours PRN Temp greater or equal to 38C (100.4F), Mild Pain (1 - 3)  ALBUTerol    90 MICROgram(s) HFA Inhaler 2 Puff(s) Inhalation every 6 hours PRN Shortness of Breath and/or Wheezing      RADIOLOGY & ADDITIONAL TESTS:   unknown

## 2021-12-16 NOTE — PROGRESS NOTE ADULT - ASSESSMENT
74 y/o female with PMH of kidney ca and colon ca s/p resection, HTN, came to the ED complaining of pleuritic chest pain. Patient said 7-8 days ago, he had a chest pressure on the left that woke him up from sleep, lasted few minutes and resolved without intervention. After started having chest pain with breathing, radiating to the back associated with shortness of breath. He also endorsed fever started 3 days ago. Of note, he has been having hematuria x 6 days. CT chest/abdomen/pelvis with contrast: No pulmonary embolus. Enlarged prostate. Moderately large pericardial effusion with evidence of right-sided congestive heart failure.    Moderate Pericardial effusion   CT chest with moderate effusion and signs of right sided congestive failure  TTE - moderate effusion and preserved EF  s/p pericardiocentesis on 12/13 with 450 cc of fluid removed  repeat limited TTE today with rapid reaccumulation of fluid with moderate effusion; will need a pericardial window. CT surgery followign  Started on colchicine and indomethacin but given concerns for malignancy have been discontinued  f/u pericardial fluid studies; including culture and cytology  - npo pmn     sinus pause - ep following  - ok to resume coreg with holding parameters    Atrial Fibrillation  -continue coreg  -not a candidate for AC given bloody pericardial effusion   -EP recommendations appreciated;    Hypertensive urgency   BP better controlled  Continue Hydralazine, coreg and norvasc    LINDEN on CKD Stage 2 - resolved  Likely prerenal azotemia   s/p cardiac cath   UA noted; f/u urine culture  continue to hold ACE and NSAIDS  CT with renal lesion; renal/bladder US without hydro    BPH  continue Flomax      Neuropathy   Continue Gabapentin BID    NSVT  continue coreg  s/p cath with mild CAD    History of Colon CA s/p resection and Renal CA  -CT c/a/p reviewed; renal lesion noted which was not seen on sono  -given bloody pericardial effusion; high suspicion for malignancy  -hem/onc consult appreciated - outpatient follow up    DVT prophylaxis: SCDs given bloody pericardial effusion    dispo - npo pmn

## 2021-12-16 NOTE — PROGRESS NOTE ADULT - SUBJECTIVE AND OBJECTIVE BOX
Significant recent/past 24 hr events:  No acute overnight events. Pt remained HD stable and without acute complaints in AM.  Pt currently in NAD and denies N/V/D HA, dizziness, blurry vision, numbness/tingling, SOB, cough, chest pain, palpitations, abd pain or urinary sx's.   Pericardial Window rescheduled for tomorrow 12/17 with Dr Tena.     Subjective:  Review of Systems  ROS negative x 10 systems except as noted above    Patient is a 75y old  Male who presents with a chief complaint of pericardial effusion (15 Dec 2021 12:33)    HPI:  76 y/o female with PMH of kidney ca and colon ca s/p resection, HTN, came to the ED complaining of pleuritic chest pain. Patient said 7-8 days ago, he had a chest pressure on the left that woke him up from sleep, lasted few minutes and resolved without intervention. After started having chest pain with breathing, radiating to the back associated with shortness of breath. He also endorsed fever started 3 days ago. Of note, he has been having hematuria x 6 days. He has no palpitation, nausea, vomiting, abdominal pain, diarrhea, dysuria, sick contact.    (11 Dec 2021 20:23)    PAST MEDICAL & SURGICAL HISTORY:  HTN (hypertension)    Neuropathy    FAMILY HISTORY:  FHx: stomach cancer (Father)    Vitals   ICU Vital Signs Last 24 Hrs  T(C): 36.7 (16 Dec 2021 10:00), Max: 36.8 (16 Dec 2021 04:30)  T(F): 98.1 (16 Dec 2021 10:00), Max: 98.2 (16 Dec 2021 04:30)  HR: 62 (16 Dec 2021 10:45) (50 - 88)  BP: 162/74 (16 Dec 2021 10:57) (105/45 - 177/75)  BP(mean): 109 (16 Dec 2021 10:45) (65 - 109)  ABP: --  ABP(mean): --  RR: 14 (16 Dec 2021 10:45) (13 - 18)  SpO2: 93% (16 Dec 2021 10:45) (93% - 99%)    I&O's Detail    15 Dec 2021 07:01  -  16 Dec 2021 07:00  --------------------------------------------------------  IN:    Oral Fluid: 120 mL  Total IN: 120 mL    OUT:    Voided (mL): 450 mL  Total OUT: 450 mL    Total NET: -330 mL    LABS                        10.9   9.10  )-----------( 330      ( 16 Dec 2021 04:50 )             33.1     12-16    136  |  99  |  30.7<H>  ----------------------------<  106<H>  4.4   |  25.0  |  1.19    Ca    7.9<L>      16 Dec 2021 04:50  Mg     2.4     12-16    TPro  6.3<L>  /  Alb  2.7<L>  /  TBili  0.4  /  DBili  x   /  AST  29  /  ALT  30  /  AlkPhos  270<H>  12-16    LIVER FUNCTIONS - ( 16 Dec 2021 04:50 )  Alb: 2.7 g/dL / Pro: 6.3 g/dL / ALK PHOS: 270 U/L / ALT: 30 U/L / AST: 29 U/L / GGT: x           PT/INR - ( 15 Dec 2021 16:45 )   PT: 15.6 sec;   INR: 1.36 ratio         PTT - ( 15 Dec 2021 16:45 )  PTT:34.0 sec    MEDICATIONS  (STANDING):  amLODIPine   Tablet 5 milliGRAM(s) Oral daily  carvedilol 6.25 milliGRAM(s) Oral every 12 hours  gabapentin 600 milliGRAM(s) Oral two times a day  hydrALAZINE 50 milliGRAM(s) Oral three times a day  influenza  Vaccine (HIGH DOSE) 0.7 milliLiter(s) IntraMuscular once  pantoprazole    Tablet 40 milliGRAM(s) Oral before breakfast  tamsulosin 0.4 milliGRAM(s) Oral at bedtime    MEDICATIONS  (PRN):  acetaminophen     Tablet .. 650 milliGRAM(s) Oral every 6 hours PRN Temp greater or equal to 38C (100.4F), Mild Pain (1 - 3)  ALBUTerol    90 MICROgram(s) HFA Inhaler 2 Puff(s) Inhalation every 6 hours PRN Shortness of Breath and/or Wheezing      Allergies:  penicillin (Rash)      Physical Exam:   Constitutional: NAD lying in bed.  Neck: supple, trachea midline.  No JVD  Respiratory: Breath Sounds equal & clear bilaterally to auscultation, no accessory muscle use noted. No wheezing, rales, or rhonchi noted b/l  Cardiovascular: Regular rate, regular rhythm, normal S1, S2; no murmurs or rub  Gastrointestinal: Soft, non-tender, non distended, normal bowel sounds  Extremities: ENGLE x 4, no peripheral edema, no cyanosis, no clubbing   Vascular: Equal and normal pulses: 2+ peripheral pulses throughout  Neurological: A+O x 3; speech clear and intact; no gross sensory/motor deficits  Psychiatric: calm, normal mood, normal affect  Skin: warm, dry, well perfused, no rashes  Lines: PIV      Code Status: Full Code       Critical care time spent: 35 minutes (reviewing chart including medication, labs and imaging results, discussions with interdisciplinary team, discussing goals of care/advanced directives, counseling patient and/or family, non-inclusive of procedures)    Case including assessment/plan of care discussed with attending.

## 2021-12-16 NOTE — PROGRESS NOTE ADULT - PROBLEM SELECTOR PLAN 1
Re-accumulation of pericardial effusion s/p drainage this admission.  12/14: Moderate sized pericardial effusion.  Concern for malignant effusion with significant cancer history.   Thoracic surgery consulted for pericardial window.  Plan for OR for pericardial window rescheduled for Friday 12/17 w/ Dr. Tena.   COVID swab in AM; T&S current.  Cont care per primary team, will cont to follow.

## 2021-12-17 ENCOUNTER — RESULT REVIEW (OUTPATIENT)
Age: 76
End: 2021-12-17

## 2021-12-17 ENCOUNTER — APPOINTMENT (OUTPATIENT)
Dept: THORACIC SURGERY | Facility: HOSPITAL | Age: 76
End: 2021-12-17

## 2021-12-17 LAB
ALBUMIN SERPL ELPH-MCNC: 2.9 G/DL — LOW (ref 3.3–5.2)
ALP SERPL-CCNC: 290 U/L — HIGH (ref 40–120)
ALT FLD-CCNC: 32 U/L — SIGNIFICANT CHANGE UP
ANION GAP SERPL CALC-SCNC: 11 MMOL/L — SIGNIFICANT CHANGE UP (ref 5–17)
APTT BLD: 29.6 SEC — SIGNIFICANT CHANGE UP (ref 27.5–35.5)
AST SERPL-CCNC: 28 U/L — SIGNIFICANT CHANGE UP
BASOPHILS # BLD AUTO: 0 K/UL — SIGNIFICANT CHANGE UP (ref 0–0.2)
BASOPHILS NFR BLD AUTO: 0 % — SIGNIFICANT CHANGE UP (ref 0–2)
BILIRUB SERPL-MCNC: 0.5 MG/DL — SIGNIFICANT CHANGE UP (ref 0.4–2)
BUN SERPL-MCNC: 20.9 MG/DL — HIGH (ref 8–20)
CALCIUM SERPL-MCNC: 8.4 MG/DL — LOW (ref 8.6–10.2)
CHLORIDE SERPL-SCNC: 97 MMOL/L — LOW (ref 98–107)
CO2 SERPL-SCNC: 25 MMOL/L — SIGNIFICANT CHANGE UP (ref 22–29)
CREAT SERPL-MCNC: 0.94 MG/DL — SIGNIFICANT CHANGE UP (ref 0.5–1.3)
EOSINOPHIL # BLD AUTO: 0.22 K/UL — SIGNIFICANT CHANGE UP (ref 0–0.5)
EOSINOPHIL NFR BLD AUTO: 2.6 % — SIGNIFICANT CHANGE UP (ref 0–6)
GIANT PLATELETS BLD QL SMEAR: PRESENT — SIGNIFICANT CHANGE UP
GLUCOSE SERPL-MCNC: 103 MG/DL — HIGH (ref 70–99)
HCT VFR BLD CALC: 37.5 % — LOW (ref 39–50)
HGB BLD-MCNC: 12.2 G/DL — LOW (ref 13–17)
INR BLD: 1.26 RATIO — HIGH (ref 0.88–1.16)
LYMPHOCYTES # BLD AUTO: 0.73 K/UL — LOW (ref 1–3.3)
LYMPHOCYTES # BLD AUTO: 8.7 % — LOW (ref 13–44)
MAGNESIUM SERPL-MCNC: 2.3 MG/DL — SIGNIFICANT CHANGE UP (ref 1.6–2.6)
MANUAL SMEAR VERIFICATION: SIGNIFICANT CHANGE UP
MCHC RBC-ENTMCNC: 29.5 PG — SIGNIFICANT CHANGE UP (ref 27–34)
MCHC RBC-ENTMCNC: 32.5 GM/DL — SIGNIFICANT CHANGE UP (ref 32–36)
MCV RBC AUTO: 90.8 FL — SIGNIFICANT CHANGE UP (ref 80–100)
MONOCYTES # BLD AUTO: 0.81 K/UL — SIGNIFICANT CHANGE UP (ref 0–0.9)
MONOCYTES NFR BLD AUTO: 9.6 % — SIGNIFICANT CHANGE UP (ref 2–14)
NEUTROPHILS # BLD AUTO: 6.49 K/UL — SIGNIFICANT CHANGE UP (ref 1.8–7.4)
NEUTROPHILS NFR BLD AUTO: 77.4 % — HIGH (ref 43–77)
PLAT MORPH BLD: NORMAL — SIGNIFICANT CHANGE UP
PLATELET # BLD AUTO: SIGNIFICANT CHANGE UP K/UL (ref 150–400)
POTASSIUM SERPL-MCNC: 4.5 MMOL/L — SIGNIFICANT CHANGE UP (ref 3.5–5.3)
POTASSIUM SERPL-SCNC: 4.5 MMOL/L — SIGNIFICANT CHANGE UP (ref 3.5–5.3)
PROT SERPL-MCNC: 6.7 G/DL — SIGNIFICANT CHANGE UP (ref 6.6–8.7)
PROTHROM AB SERPL-ACNC: 14.5 SEC — HIGH (ref 10.6–13.6)
RBC # BLD: 4.13 M/UL — LOW (ref 4.2–5.8)
RBC # FLD: 12.3 % — SIGNIFICANT CHANGE UP (ref 10.3–14.5)
RBC BLD AUTO: NORMAL — SIGNIFICANT CHANGE UP
SARS-COV-2 RNA SPEC QL NAA+PROBE: SIGNIFICANT CHANGE UP
SODIUM SERPL-SCNC: 133 MMOL/L — LOW (ref 135–145)
VARIANT LYMPHS # BLD: 1.7 % — SIGNIFICANT CHANGE UP (ref 0–6)
WBC # BLD: 8.39 K/UL — SIGNIFICANT CHANGE UP (ref 3.8–10.5)
WBC # FLD AUTO: 8.39 K/UL — SIGNIFICANT CHANGE UP (ref 3.8–10.5)

## 2021-12-17 PROCEDURE — 88112 CYTOPATH CELL ENHANCE TECH: CPT | Mod: 26

## 2021-12-17 PROCEDURE — 88305 TISSUE EXAM BY PATHOLOGIST: CPT | Mod: 26

## 2021-12-17 PROCEDURE — 99232 SBSQ HOSP IP/OBS MODERATE 35: CPT

## 2021-12-17 PROCEDURE — 71045 X-RAY EXAM CHEST 1 VIEW: CPT | Mod: 26

## 2021-12-17 PROCEDURE — 33025 INCISION OF HEART SAC: CPT

## 2021-12-17 RX ORDER — HYDRALAZINE HCL 50 MG
50 TABLET ORAL THREE TIMES A DAY
Refills: 0 | Status: DISCONTINUED | OUTPATIENT
Start: 2021-12-17 | End: 2021-12-20

## 2021-12-17 RX ORDER — HYDROMORPHONE HYDROCHLORIDE 2 MG/ML
0.5 INJECTION INTRAMUSCULAR; INTRAVENOUS; SUBCUTANEOUS
Refills: 0 | Status: DISCONTINUED | OUTPATIENT
Start: 2021-12-17 | End: 2021-12-18

## 2021-12-17 RX ORDER — TAMSULOSIN HYDROCHLORIDE 0.4 MG/1
0.4 CAPSULE ORAL AT BEDTIME
Refills: 0 | Status: DISCONTINUED | OUTPATIENT
Start: 2021-12-17 | End: 2021-12-20

## 2021-12-17 RX ORDER — ACETAMINOPHEN 500 MG
650 TABLET ORAL EVERY 6 HOURS
Refills: 0 | Status: DISCONTINUED | OUTPATIENT
Start: 2021-12-17 | End: 2021-12-20

## 2021-12-17 RX ORDER — CARVEDILOL PHOSPHATE 80 MG/1
6.25 CAPSULE, EXTENDED RELEASE ORAL EVERY 12 HOURS
Refills: 0 | Status: DISCONTINUED | OUTPATIENT
Start: 2021-12-17 | End: 2021-12-17

## 2021-12-17 RX ORDER — PANTOPRAZOLE SODIUM 20 MG/1
40 TABLET, DELAYED RELEASE ORAL
Refills: 0 | Status: DISCONTINUED | OUTPATIENT
Start: 2021-12-17 | End: 2021-12-20

## 2021-12-17 RX ORDER — CARVEDILOL PHOSPHATE 80 MG/1
6.25 CAPSULE, EXTENDED RELEASE ORAL EVERY 12 HOURS
Refills: 0 | Status: DISCONTINUED | OUTPATIENT
Start: 2021-12-17 | End: 2021-12-20

## 2021-12-17 RX ORDER — AMLODIPINE BESYLATE 2.5 MG/1
5 TABLET ORAL DAILY
Refills: 0 | Status: DISCONTINUED | OUTPATIENT
Start: 2021-12-17 | End: 2021-12-20

## 2021-12-17 RX ORDER — SODIUM CHLORIDE 9 MG/ML
1000 INJECTION, SOLUTION INTRAVENOUS
Refills: 0 | Status: DISCONTINUED | OUTPATIENT
Start: 2021-12-17 | End: 2021-12-20

## 2021-12-17 RX ORDER — FENTANYL CITRATE 50 UG/ML
30 INJECTION INTRAVENOUS
Refills: 0 | Status: DISCONTINUED | OUTPATIENT
Start: 2021-12-17 | End: 2021-12-18

## 2021-12-17 RX ORDER — ALBUTEROL 90 UG/1
2 AEROSOL, METERED ORAL EVERY 6 HOURS
Refills: 0 | Status: DISCONTINUED | OUTPATIENT
Start: 2021-12-17 | End: 2021-12-20

## 2021-12-17 RX ORDER — HEPARIN SODIUM 5000 [USP'U]/ML
5000 INJECTION INTRAVENOUS; SUBCUTANEOUS EVERY 12 HOURS
Refills: 0 | Status: DISCONTINUED | OUTPATIENT
Start: 2021-12-18 | End: 2021-12-20

## 2021-12-17 RX ORDER — GABAPENTIN 400 MG/1
600 CAPSULE ORAL
Refills: 0 | Status: DISCONTINUED | OUTPATIENT
Start: 2021-12-17 | End: 2021-12-20

## 2021-12-17 RX ORDER — LANOLIN ALCOHOL/MO/W.PET/CERES
3 CREAM (GRAM) TOPICAL ONCE
Refills: 0 | Status: COMPLETED | OUTPATIENT
Start: 2021-12-17 | End: 2021-12-17

## 2021-12-17 RX ADMIN — SODIUM CHLORIDE 100 MILLILITER(S): 9 INJECTION, SOLUTION INTRAVENOUS at 21:53

## 2021-12-17 RX ADMIN — HYDROMORPHONE HYDROCHLORIDE 0.5 MILLIGRAM(S): 2 INJECTION INTRAMUSCULAR; INTRAVENOUS; SUBCUTANEOUS at 19:46

## 2021-12-17 RX ADMIN — Medication 50 MILLIGRAM(S): at 05:20

## 2021-12-17 RX ADMIN — GABAPENTIN 600 MILLIGRAM(S): 400 CAPSULE ORAL at 21:52

## 2021-12-17 RX ADMIN — HYDROMORPHONE HYDROCHLORIDE 0.5 MILLIGRAM(S): 2 INJECTION INTRAMUSCULAR; INTRAVENOUS; SUBCUTANEOUS at 19:18

## 2021-12-17 RX ADMIN — Medication 50 MILLIGRAM(S): at 21:53

## 2021-12-17 RX ADMIN — Medication 50 MILLIGRAM(S): at 13:32

## 2021-12-17 RX ADMIN — CARVEDILOL PHOSPHATE 6.25 MILLIGRAM(S): 80 CAPSULE, EXTENDED RELEASE ORAL at 08:30

## 2021-12-17 RX ADMIN — GABAPENTIN 600 MILLIGRAM(S): 400 CAPSULE ORAL at 05:23

## 2021-12-17 RX ADMIN — PANTOPRAZOLE SODIUM 40 MILLIGRAM(S): 20 TABLET, DELAYED RELEASE ORAL at 05:24

## 2021-12-17 RX ADMIN — Medication 3 MILLIGRAM(S): at 01:42

## 2021-12-17 RX ADMIN — AMLODIPINE BESYLATE 5 MILLIGRAM(S): 2.5 TABLET ORAL at 08:38

## 2021-12-17 RX ADMIN — TAMSULOSIN HYDROCHLORIDE 0.4 MILLIGRAM(S): 0.4 CAPSULE ORAL at 21:53

## 2021-12-17 RX ADMIN — FENTANYL CITRATE 30 MILLILITER(S): 50 INJECTION INTRAVENOUS at 19:25

## 2021-12-17 NOTE — PROGRESS NOTE ADULT - SUBJECTIVE AND OBJECTIVE BOX
ONEIDA OCHOA    [id number]    75y      Male    INTERVAL HPI/OVERNIGHT EVENTS:    off service note  74 y/o male with PMH of kidney ca and colon ca s/p resection, HTN, came to the ED complaining of pleuritic chest pain. Patient said 7-8 days ago, he had a chest pressure on the left that woke him up from sleep, lasted few minutes and resolved without intervention. After started having chest pain with breathing, radiating to the back associated with shortness of breath. He also endorsed fever started 3 days prior. Of note, he has been having hematuria x 6 days. CT chest/abdomen/pelvis with contrast: No pulmonary embolus. Enlarged prostate. Moderately large pericardial effusion with evidence of right-sided congestive heart failure. Patient is now planned for a pericardial window today with CTS. In addition patients course complicated by afib with rvr along with sinus pauses.     patient seen at bedside with language line phone . Patient denies any complaints of chest pain or sob.       REVIEW OF SYSTEMS:    CONSTITUTIONAL: No fever, weight loss, or fatigue  RESPIRATORY: No cough, wheezing, hemoptysis; No shortness of breath  CARDIOVASCULAR: No chest pain, palpitations  GASTROINTESTINAL: No abdominal or epigastric pain. No nausea, vomiting  NEUROLOGICAL: No headaches, memory loss, loss of strength.  MISCELLANEOUS:      Vital Signs Last 24 Hrs  T(C): 36.8 (17 Dec 2021 13:00), Max: 37.3 (16 Dec 2021 20:00)  T(F): 98.3 (17 Dec 2021 13:00), Max: 99.2 (16 Dec 2021 20:00)  HR: 92 (17 Dec 2021 13:00) (54 - 160)  BP: 120/79 (17 Dec 2021 13:00) (120/79 - 167/65)  BP(mean): 100 (17 Dec 2021 04:30) (92 - 108)  RR: 17 (17 Dec 2021 13:00) (16 - 18)  SpO2: 98% (17 Dec 2021 13:00) (97% - 99%)    PHYSICAL EXAM:    GENERAL: elderly male, laying in bed, NAD  HEAD:  Atraumatic, Normocephalic  EYES: EOMI, PERRLA, conjunctiva and sclera clear  ENMT: Moist mucous membranes  NECK: Supple   NERVOUS SYSTEM:  Alert & Oriented X3   CHEST/LUNG: bilateral air entry, coarse  HEART: Regular rate and rhythm; + S1/S2  ABDOMEN: Soft, Nontender, Nondistended; Bowel sounds present  EXTREMITIES:  no pedal edema      LABS:                        12.2   8.39  )-----------( Clumped    ( 17 Dec 2021 07:22 )             37.5     12-17    133<L>  |  97<L>  |  20.9<H>  ----------------------------<  103<H>  4.5   |  25.0  |  0.94    Ca    8.4<L>      17 Dec 2021 07:22  Mg     2.3     12-17    TPro  6.7  /  Alb  2.9<L>  /  TBili  0.5  /  DBili  x   /  AST  28  /  ALT  32  /  AlkPhos  290<H>  12-17    PT/INR - ( 17 Dec 2021 07:22 )   PT: 14.5 sec;   INR: 1.26 ratio         PTT - ( 17 Dec 2021 07:22 )  PTT:29.6 sec        MEDICATIONS  (STANDING):  amLODIPine   Tablet 5 milliGRAM(s) Oral daily  carvedilol 6.25 milliGRAM(s) Oral every 12 hours  gabapentin 600 milliGRAM(s) Oral two times a day  hydrALAZINE 50 milliGRAM(s) Oral three times a day  influenza  Vaccine (HIGH DOSE) 0.7 milliLiter(s) IntraMuscular once  pantoprazole    Tablet 40 milliGRAM(s) Oral before breakfast  tamsulosin 0.4 milliGRAM(s) Oral at bedtime    MEDICATIONS  (PRN):  acetaminophen     Tablet .. 650 milliGRAM(s) Oral every 6 hours PRN Temp greater or equal to 38C (100.4F), Mild Pain (1 - 3)  ALBUTerol    90 MICROgram(s) HFA Inhaler 2 Puff(s) Inhalation every 6 hours PRN Shortness of Breath and/or Wheezing      RADIOLOGY & ADDITIONAL TESTS:

## 2021-12-17 NOTE — BRIEF OPERATIVE NOTE - OPERATION/FINDINGS
Subxiphoid incision made, dissection carried out to isolate xiphoid. Xiphoid resected. Pericardium exposed and window created. Approximately 50cc fluid drained from pericardium. Pericardium with adhesions to heart. FARHAT left pericardial space. Skin closed with Vicryl and staples.

## 2021-12-17 NOTE — PROGRESS NOTE ADULT - ASSESSMENT
Lorenzo Beverly is a 75 year old male with kidney cancer and colon CA s/p resection and HTN presenting with pleuritic chest pain found to have moderate-large pericardial effusion s/p pericardiocentesis (12/13/21) with bloody fluid drained who had reaccumulation of fluid suspected to be secondary to malignant effusion now awaiting pericardial window.    In this setting he has had paroxysmal AF/AFL with RVR which is self-limited and patient is asymptomatic. He previously had an asymptomatic conversion pause of 4 seconds. For now would recommend telemetry monitoring. If anything, can consider increasing Carvedilol dose for some rate control. However, since he is asymptomatic and his episodes are short, it is OK to monitor for now and not to be too aggressive.    Will continue to follow. May need pacemaker if he continues to have a high burden, especially if he develops symptoms.    Hold off on AC for now given high risk of recurrent pericardial effusion.    Paroxysmal AF/AFL  Sinus node dysfunction  Pericardial Effusion    Recommendations:  - Continue carvedilol 6.25mg BID  - Monitor on telemetry  - Hold off on AC for now    We will follow with you.    Jason Connelly MD  Clinical Cardiac Electrophysiology

## 2021-12-17 NOTE — PROGRESS NOTE ADULT - ASSESSMENT
76 y/o male with PMH of kidney ca and colon ca s/p resection, HTN, came to the ED complaining of pleuritic chest pain. Patient said 7-8 days ago, he had a chest pressure on the left that woke him up from sleep, lasted few minutes and resolved without intervention. After started having chest pain with breathing, radiating to the back associated with shortness of breath. He also endorsed fever started 3 days ago. Of note, he has been having hematuria x 6 days. CT chest/abdomen/pelvis with contrast: No pulmonary embolus. Enlarged prostate. Moderately large pericardial effusion with evidence of right-sided congestive heart failure.    Moderate Pericardial effusion =   CT chest with moderate effusion and signs of right sided congestive failure  TTE - moderate effusion and preserved EF  s/p pericardiocentesis on 12/13 with 450 cc of fluid removed  repeat limited TTE  with rapid reaccumulation of fluid with moderate effusion; will need a pericardial window today   Started on colchicine and indomethacin but given concerns for malignancy have been discontinued  f/u pericardial fluid studies; including culture and cytology    sinus pause - ep following  -  coreg with holding parameters    Atrial Fibrillation  -continue coreg  -not a candidate for AC given bloody pericardial effusion   -EP recommendations appreciated;    Hypertensive urgency   BP better controlled  Continue Hydralazine, coreg and norvasc    LINDEN on CKD Stage 2 - resolved  Likely prerenal azotemia   s/p cardiac cath   UA noted; f/u urine culture  continue to hold ACE and NSAIDS  CT with renal lesion; renal/bladder US without hydro    BPH  continue Flomax      Neuropathy   Continue Gabapentin BID    NSVT  continue coreg  s/p cath with mild CAD    History of Colon CA s/p resection and Renal CA  -CT c/a/p reviewed; renal lesion noted which was not seen on sono  -given bloody pericardial effusion; high suspicion for malignancy  -hem/onc consult appreciated - outpatient follow up    DVT prophylaxis: SCDs given bloody pericardial effusion

## 2021-12-17 NOTE — PROGRESS NOTE ADULT - SUBJECTIVE AND OBJECTIVE BOX
Electrophysiology Attending Follow Up Note    Subjective: Feels well. Denies having any dyspnea, palpitations, dizziness/lightheadedness.    TELE: Paroxysmal AF/AFL with RVR. No conversion pauses.    MEDICATIONS  (STANDING):  amLODIPine   Tablet 5 milliGRAM(s) Oral daily  carvedilol 6.25 milliGRAM(s) Oral every 12 hours  gabapentin 600 milliGRAM(s) Oral two times a day  hydrALAZINE 50 milliGRAM(s) Oral three times a day  influenza  Vaccine (HIGH DOSE) 0.7 milliLiter(s) IntraMuscular once  pantoprazole    Tablet 40 milliGRAM(s) Oral before breakfast  tamsulosin 0.4 milliGRAM(s) Oral at bedtime    MEDICATIONS  (PRN):  acetaminophen     Tablet .. 650 milliGRAM(s) Oral every 6 hours PRN Temp greater or equal to 38C (100.4F), Mild Pain (1 - 3)  ALBUTerol    90 MICROgram(s) HFA Inhaler 2 Puff(s) Inhalation every 6 hours PRN Shortness of Breath and/or Wheezing    Allergies  penicillin (Rash)    Vital Signs Last 24 Hrs  T(C): 36.6 (17 Dec 2021 08:30), Max: 37.3 (16 Dec 2021 20:00)  T(F): 97.9 (17 Dec 2021 08:30), Max: 99.2 (16 Dec 2021 20:00)  HR: 160 (17 Dec 2021 08:30) (54 - 160)  BP: 154/68 (17 Dec 2021 08:30) (127/75 - 177/75)  BP(mean): 100 (17 Dec 2021 04:30) (92 - 109)  RR: 18 (17 Dec 2021 08:30) (14 - 18)  SpO2: 97% (17 Dec 2021 08:30) (93% - 99%)    Physical Exam:  Constitutional: NAD, AAOx3  Pulmonary: Unlabored  Extremities: no pedal edema  Neuro: non focal, ENGLE x4    LABS:             12.2   8.39  )-----------( Clumped    ( 17 Dec 2021 07:22 )             37.5     12-17    133<L>  |  97<L>  |  20.9<H>  ----------------------------<  103<H>  4.5   |  25.0  |  0.94    Ca    8.4<L>      17 Dec 2021 07:22  Mg     2.3     12-17    TPro  6.7  /  Alb  2.9<L>  /  TBili  0.5  /  DBili  x   /  AST  28  /  ALT  32  /  AlkPhos  290<H>  12-17    PT/INR - ( 17 Dec 2021 07:22 )   PT: 14.5 sec;   INR: 1.26 ratio       PTT - ( 17 Dec 2021 07:22 )  PTT:29.6 sec

## 2021-12-17 NOTE — DIETITIAN INITIAL EVALUATION ADULT. - ORAL INTAKE PTA/DIET HISTORY
Pt NPO this morning for procedure today.  Pt reports good po intake at meals since admission.  Pt states good appetite prior to admission and denies wt loss.

## 2021-12-17 NOTE — DIETITIAN INITIAL EVALUATION ADULT. - PERTINENT LABORATORY DATA
12-17 Na133 mmol/L<L> Glu 103 mg/dL<H> K+ 4.5 mmol/L Cr  0.94 mg/dL BUN 20.9 mg/dL<H> Phos n/a   Alb 2.9 g/dL<L> PAB n/a

## 2021-12-17 NOTE — DIETITIAN INITIAL EVALUATION ADULT. - OTHER INFO
Pt with h/o kidney ca and colon ca s/p resection, HTN, came to the ED complaining of pleuritic chest pain. Patient said 7-8 days ago, he had a chest pressure on the left that woke him up from sleep, lasted few minutes and resolved without intervention. After started having chest pain with breathing, radiating to the back associated with shortness of breath. He also endorsed fever started 3 days ago. Of note, he has been having hematuria x 6 days. CT chest/abdomen/pelvis with contrast: No pulmonary embolus. Enlarged prostate. Moderately large pericardial effusion with evidence of right-sided congestive heart failure.

## 2021-12-18 LAB
ALBUMIN SERPL ELPH-MCNC: 2.6 G/DL — LOW (ref 3.3–5.2)
ALP SERPL-CCNC: 222 U/L — HIGH (ref 40–120)
ALT FLD-CCNC: 24 U/L — SIGNIFICANT CHANGE UP
ANION GAP SERPL CALC-SCNC: 10 MMOL/L — SIGNIFICANT CHANGE UP (ref 5–17)
APTT BLD: 32.7 SEC — SIGNIFICANT CHANGE UP (ref 27.5–35.5)
AST SERPL-CCNC: 25 U/L — SIGNIFICANT CHANGE UP
BILIRUB SERPL-MCNC: 0.4 MG/DL — SIGNIFICANT CHANGE UP (ref 0.4–2)
BUN SERPL-MCNC: 19.6 MG/DL — SIGNIFICANT CHANGE UP (ref 8–20)
CALCIUM SERPL-MCNC: 8 MG/DL — LOW (ref 8.6–10.2)
CHLORIDE SERPL-SCNC: 98 MMOL/L — SIGNIFICANT CHANGE UP (ref 98–107)
CO2 SERPL-SCNC: 25 MMOL/L — SIGNIFICANT CHANGE UP (ref 22–29)
CREAT SERPL-MCNC: 0.8 MG/DL — SIGNIFICANT CHANGE UP (ref 0.5–1.3)
CULTURE RESULTS: SIGNIFICANT CHANGE UP
GLUCOSE SERPL-MCNC: 111 MG/DL — HIGH (ref 70–99)
GRAM STN FLD: SIGNIFICANT CHANGE UP
HCT VFR BLD CALC: 34.2 % — LOW (ref 39–50)
HGB BLD-MCNC: 11.2 G/DL — LOW (ref 13–17)
INR BLD: 1.33 RATIO — HIGH (ref 0.88–1.16)
MAGNESIUM SERPL-MCNC: 2.3 MG/DL — SIGNIFICANT CHANGE UP (ref 1.6–2.6)
MCHC RBC-ENTMCNC: 30.3 PG — SIGNIFICANT CHANGE UP (ref 27–34)
MCHC RBC-ENTMCNC: 32.7 GM/DL — SIGNIFICANT CHANGE UP (ref 32–36)
MCV RBC AUTO: 92.4 FL — SIGNIFICANT CHANGE UP (ref 80–100)
NIGHT BLUE STAIN TISS: SIGNIFICANT CHANGE UP
PHOSPHATE SERPL-MCNC: 3.2 MG/DL — SIGNIFICANT CHANGE UP (ref 2.4–4.7)
PLATELET # BLD AUTO: 378 K/UL — SIGNIFICANT CHANGE UP (ref 150–400)
POTASSIUM SERPL-MCNC: 4.7 MMOL/L — SIGNIFICANT CHANGE UP (ref 3.5–5.3)
POTASSIUM SERPL-SCNC: 4.7 MMOL/L — SIGNIFICANT CHANGE UP (ref 3.5–5.3)
PROT SERPL-MCNC: 6.4 G/DL — LOW (ref 6.6–8.7)
PROTHROM AB SERPL-ACNC: 15.2 SEC — HIGH (ref 10.6–13.6)
RBC # BLD: 3.7 M/UL — LOW (ref 4.2–5.8)
RBC # FLD: 12.3 % — SIGNIFICANT CHANGE UP (ref 10.3–14.5)
SODIUM SERPL-SCNC: 133 MMOL/L — LOW (ref 135–145)
SPECIMEN SOURCE: SIGNIFICANT CHANGE UP
WBC # BLD: 7.97 K/UL — SIGNIFICANT CHANGE UP (ref 3.8–10.5)
WBC # FLD AUTO: 7.97 K/UL — SIGNIFICANT CHANGE UP (ref 3.8–10.5)

## 2021-12-18 PROCEDURE — 99232 SBSQ HOSP IP/OBS MODERATE 35: CPT

## 2021-12-18 PROCEDURE — 71045 X-RAY EXAM CHEST 1 VIEW: CPT | Mod: 26

## 2021-12-18 PROCEDURE — 99233 SBSQ HOSP IP/OBS HIGH 50: CPT

## 2021-12-18 RX ORDER — MORPHINE SULFATE 50 MG/1
2 CAPSULE, EXTENDED RELEASE ORAL EVERY 4 HOURS
Refills: 0 | Status: DISCONTINUED | OUTPATIENT
Start: 2021-12-18 | End: 2021-12-20

## 2021-12-18 RX ORDER — OXYCODONE HYDROCHLORIDE 5 MG/1
5 TABLET ORAL EVERY 4 HOURS
Refills: 0 | Status: DISCONTINUED | OUTPATIENT
Start: 2021-12-18 | End: 2021-12-20

## 2021-12-18 RX ORDER — HYDRALAZINE HCL 50 MG
5 TABLET ORAL ONCE
Refills: 0 | Status: DISCONTINUED | OUTPATIENT
Start: 2021-12-18 | End: 2021-12-18

## 2021-12-18 RX ORDER — HYDRALAZINE HCL 50 MG
5 TABLET ORAL ONCE
Refills: 0 | Status: COMPLETED | OUTPATIENT
Start: 2021-12-18 | End: 2021-12-18

## 2021-12-18 RX ADMIN — OXYCODONE HYDROCHLORIDE 5 MILLIGRAM(S): 5 TABLET ORAL at 17:25

## 2021-12-18 RX ADMIN — Medication 50 MILLIGRAM(S): at 21:29

## 2021-12-18 RX ADMIN — GABAPENTIN 600 MILLIGRAM(S): 400 CAPSULE ORAL at 06:01

## 2021-12-18 RX ADMIN — CARVEDILOL PHOSPHATE 6.25 MILLIGRAM(S): 80 CAPSULE, EXTENDED RELEASE ORAL at 17:40

## 2021-12-18 RX ADMIN — HEPARIN SODIUM 5000 UNIT(S): 5000 INJECTION INTRAVENOUS; SUBCUTANEOUS at 10:12

## 2021-12-18 RX ADMIN — GABAPENTIN 600 MILLIGRAM(S): 400 CAPSULE ORAL at 17:40

## 2021-12-18 RX ADMIN — Medication 50 MILLIGRAM(S): at 06:01

## 2021-12-18 RX ADMIN — Medication 50 MILLIGRAM(S): at 14:26

## 2021-12-18 RX ADMIN — CARVEDILOL PHOSPHATE 6.25 MILLIGRAM(S): 80 CAPSULE, EXTENDED RELEASE ORAL at 06:02

## 2021-12-18 RX ADMIN — Medication 5 MILLIGRAM(S): at 02:20

## 2021-12-18 RX ADMIN — HEPARIN SODIUM 5000 UNIT(S): 5000 INJECTION INTRAVENOUS; SUBCUTANEOUS at 21:29

## 2021-12-18 RX ADMIN — TAMSULOSIN HYDROCHLORIDE 0.4 MILLIGRAM(S): 0.4 CAPSULE ORAL at 21:29

## 2021-12-18 RX ADMIN — FENTANYL CITRATE 30 MILLILITER(S): 50 INJECTION INTRAVENOUS at 07:14

## 2021-12-18 RX ADMIN — AMLODIPINE BESYLATE 5 MILLIGRAM(S): 2.5 TABLET ORAL at 06:02

## 2021-12-18 RX ADMIN — OXYCODONE HYDROCHLORIDE 5 MILLIGRAM(S): 5 TABLET ORAL at 16:55

## 2021-12-18 RX ADMIN — PANTOPRAZOLE SODIUM 40 MILLIGRAM(S): 20 TABLET, DELAYED RELEASE ORAL at 06:01

## 2021-12-18 NOTE — PROGRESS NOTE ADULT - ASSESSMENT
76 y/o female with PMH of kidney ca and colon ca s/p resection, HTN, came to the ED complaining of pleuritic chest pain. Patient said 7-8 days ago, he had a chest pressure on the left that woke him up from sleep, lasted few minutes and resolved without intervention. After started having chest pain with breathing, radiating to the back associated with shortness of breath. He also endorsed fever started 3 days ago. Of note, he has been having hematuria x 6 days. CT chest/abdomen/pelvis with contrast: No pulmonary embolus. Enlarged prostate. Moderately large pericardial effusion with evidence of right-sided congestive heart failure.    Moderate Pericardial effusion S/P drainage and creation of pericardial window2/17  - Drainage tube present  - f/u Dr Tena rec  - TTE - moderate effusion and preserved EF  - hx s/p pericardiocentesis on 12/13 with 450 cc of fluid removed  - Started on colchicine and indomethacin but given concerns for malignancy have been discontinued  - f/u pericardial fluid studies; including culture and cytology  -will dc fentalyn pca pump.  -start prn oxy IR      sinus pause - ep following  -  resume coreg with holding parameters    Atrial Fibrillation  -continue coreg  -not a candidate for AC given bloody pericardial effusion   -EP recommendations appreciated;    Hypertensive urgency   BP better controlled  Continue Hydralazine, coreg and norvasc    LINDEN on CKD Stage 2 - resolved  Likely prerenal azotemia   s/p cardiac cath   UA noted; f/u urine culture  continue to hold ACE and NSAIDS  CT with renal lesion; renal/bladder US without hydro,f/u urology out pt    BPH  continue Flomax      Neuropathy   Continue Gabapentin BID    NSVT  continue coreg  s/p cath with mild CAD    History of Colon CA s/p resection and Renal CA  -CT c/a/p reviewed; renal lesion noted which was not seen on sono  -given bloody pericardial effusion; high suspicion for malignancy  -hem/onc consult appreciated - outpatient follow up    DVT prophylaxis: SCDs given bloody pericardial effusion  care of plan romi pt   romi rn

## 2021-12-18 NOTE — PROGRESS NOTE ADULT - SUBJECTIVE AND OBJECTIVE BOX
Pt seen sitting in chair, no new complaints  s/p pericardial window by Dr Tena yesterday 12/17/21, mario drain in place.  EP following for pAF. Short, infrequent  bursts overnight up to 140bpm. asymptomatic and self limited.   No conversion pauses or bradycardia     TELE: sinus rhythm w/ intact conduction, 3 beats NSVT, Short, infrequent  bursts of pAF overnight up to 140bpm.    MEDICATIONS  (STANDING):  amLODIPine   Tablet 5 milliGRAM(s) Oral daily  carvedilol 6.25 milliGRAM(s) Oral every 12 hours  gabapentin 600 milliGRAM(s) Oral two times a day  heparin   Injectable 5000 Unit(s) SubCutaneous every 12 hours  hydrALAZINE 50 milliGRAM(s) Oral three times a day  influenza  Vaccine (HIGH DOSE) 0.7 milliLiter(s) IntraMuscular once  lactated ringers. 1000 milliLiter(s) (100 mL/Hr) IV Continuous <Continuous>  pantoprazole    Tablet 40 milliGRAM(s) Oral before breakfast  tamsulosin 0.4 milliGRAM(s) Oral at bedtime    Vital Signs Last 24 Hrs  T(C): 36.6 (18 Dec 2021 14:30), Max: 36.9 (17 Dec 2021 21:11)  T(F): 97.8 (18 Dec 2021 14:30), Max: 98.5 (17 Dec 2021 21:11)  HR: 67 (18 Dec 2021 14:30) (55 - 77)  BP: 160/55 (18 Dec 2021 14:30) (133/68 - 178/59)  BP(mean): 105 (18 Dec 2021 06:00) (70 - 105)  RR: 17 (18 Dec 2021 14:30) (14 - 22)  SpO2: 97% (18 Dec 2021 14:30) (93% - 100%)    Physical Exam:  Constitutional: NAD, AAOx3  Cardiovascular: +S1S2 RRR, sub xyphoid MARIO drain in place, minimal serosanguinous fluid   Pulmonary: CTA b/l, unlabored  GI: soft NTND +BS  Extremities: no pedal edema, +distal pulses b/l  Neuro: non focal, ENGLE x4    LABS:                        11.2   7.97  )-----------( 378      ( 18 Dec 2021 08:13 )             34.2     12-18    133<L>  |  98  |  19.6  ----------------------------<  111<H>  4.7   |  25.0  |  0.80    Ca    8.0<L>      18 Dec 2021 06:08  Phos  3.2     12-18  Mg     2.3     12-18    TPro  6.4<L>  /  Alb  2.6<L>  /  TBili  0.4  /  DBili  x   /  AST  25  /  ALT  24  /  AlkPhos  222<H>  12-18      RADIOLOGY & ADDITIONAL TESTS:  < from: TTE Echo Complete w/o Contrast w/ Doppler (12.14.21 @ 09:38) >  Summary:   1. Left ventricular ejection fraction, by visual estimation, is 50 to 55%.   2. Normal global left ventricular systolic function.   3. Moderate sized pericardial effusion. There is excessive respiratory  variation, approx > 25%, in the mitral valve spectral Doppler velocities.   4. Moderate aortic regurgitation.   5. Trace mitral valve regurgitation.   6. Compared to TTE done on 12/13/2021 (post pericardiocentesis), there is currently moderate sized pericardial effusion as described above.  Paula Snow D.O. Electronically signed on 12/14/2021 at 6:00:32 PM  *** Final ***    POLO JONES   < end of copied text >      A/P: 75 year old male with kidney cancer and colon CA s/p resection and HTN presenting with pleuritic chest pain found to have moderate-large pericardial effusion s/p pericardiocentesis (12/13/21) with bloody fluid drained who had reaccumulation of fluid suspected to be secondary to malignant effusion now s/p pericardial window 12/17/21, MARIO drain in place w/ minimal drainage. In this setting he has had paroxysmal AF/AFL with RVR which is self-limited and  asymptomatic. He previously had an asymptomatic conversion pause of 4 seconds. For now would recommend telemetry monitoring. If anything, can consider increasing Carvedilol dose for some rate control. However, since he is asymptomatic and his episodes are short, it is OK to monitor for now and not to be too aggressive.    Will continue to follow. May need pacemaker if he continues to have a high burden, especially if he develops symptoms.    Hold off on AC for now given high risk of recurrent pericardial effusion.    Paroxysmal AF/AFL  Sinus node dysfunction  Pericardial Effusion    Recommendations:  - Continue carvedilol 6.25mg BID  - Monitor on telemetry  - Hold off on AC for now    MARJORIE Roca

## 2021-12-18 NOTE — PROGRESS NOTE ADULT - SUBJECTIVE AND OBJECTIVE BOX
HPI:  74 y/o female with PMH of kidney ca and colon ca s/p resection, HTN, came to the ED complaining of pleuritic chest pain. Patient said 7-8 days ago, he had a chest pressure on the left that woke him up from sleep, lasted few minutes and resolved without intervention. After started having chest pain with breathing, radiating to the back associated with shortness of breath. He also endorsed fever started 3 days ago. Of note, he has been having hematuria x 6 days. He has no palpitation, nausea, vomiting, abdominal pain, diarrhea, dysuria, sick contact.    (11 Dec 2021 20:23)  Patient underwent drainage of pericardial effusion and creation pericardial window on 10/17. Denies CP, SOB, Abdominal pain, nausea, vomitting, headache      PAST MEDICAL & SURGICAL HISTORY:  HTN (hypertension)  Neuropathy    Allergies    penicillin (Rash)    MEDICATIONS  (STANDING):  amLODIPine   Tablet 5 milliGRAM(s) Oral daily  carvedilol 6.25 milliGRAM(s) Oral every 12 hours  gabapentin 600 milliGRAM(s) Oral two times a day  heparin   Injectable 5000 Unit(s) SubCutaneous every 12 hours  hydrALAZINE 50 milliGRAM(s) Oral three times a day  influenza  Vaccine (HIGH DOSE) 0.7 milliLiter(s) IntraMuscular once  lactated ringers. 1000 milliLiter(s) (100 mL/Hr) IV Continuous <Continuous>  pantoprazole    Tablet 40 milliGRAM(s) Oral before breakfast  tamsulosin 0.4 milliGRAM(s) Oral at bedtime    MEDICATIONS  (PRN):  acetaminophen     Tablet .. 650 milliGRAM(s) Oral every 6 hours PRN Temp greater or equal to 38C (100.4F), Mild Pain (1 - 3)  ALBUTerol    90 MICROgram(s) HFA Inhaler 2 Puff(s) Inhalation every 6 hours PRN Shortness of Breath and/or Wheezing  HYDROmorphone  Injectable 0.5 milliGRAM(s) IV Push every 30 minutes PRN Severe Pain (7 - 10)    FAMILY HISTORY:  FHx: stomach cancer (Father)    Social History:  No cigarette, alcohol or illicit drug use. Visiting from [place], staying with daughter (11 Dec 2021 20:23)    ICU Vital Signs Last 24 Hrs  T(C): 36.7 (18 Dec 2021 04:00), Max: 36.9 (17 Dec 2021 21:11)  T(F): 98.1 (18 Dec 2021 04:00), Max: 98.5 (17 Dec 2021 21:11)  HR: 60 (18 Dec 2021 08:00) (55 - 92)  BP: 133/68 (18 Dec 2021 08:00) (120/79 - 178/59)  BP(mean): 105 (18 Dec 2021 06:00) (70 - 105)  ABP: --  ABP(mean): --  RR: 18 (18 Dec 2021 08:00) (14 - 22)  SpO2: 97% (18 Dec 2021 08:00) (93% - 100%)    |12-18-21 @ 08:13            11.2<L>  7.97>--------------<378            34.2<L>    PT 15.2<H> / INR 1.33<H>    PTT 32.7    I&O's Summary    17 Dec 2021 07:01  -  18 Dec 2021 07:00  --------------------------------------------------------  IN: 600 mL / OUT: 875 mL / NET: -275 mL    FARHAT drain : 65 cc sero-sanguinous fluid overnight    < from: Xray Chest 1 View- PORTABLE-Routine (Xray Chest 1 View- PORTABLE-Routine in AM.) (12.17.21 @ 05:39) >  ACC: [id number] EXAM:  XR CHEST PORTABLE ROUTINE 1V                          PROCEDURE DATE:  12/17/2021          INTERPRETATION:  Clinical history: Shortness of breath    Chest single view    Stable cardiomegaly. Stable right basilar atelectasis. Noevidence of   pneumothorax.    IMPRESSION: Stable findings as above    --- End of Report ---      SCOT WOLF MD; Attending Radiologist  This document has been electronically signed. Dec 18 2021 10:36AM    < end of copied text >    Physical Exam:  Patient offers no complaints  Neuro: A/O x 3, moves all extremities x 4 no focal motor or sensory defecits  Skin:  warm Dry  COR:  RRR, distant heart sounds  Lungs:  CTA bilaterally, FARHAT drain in place S/P creation of pericardial window and drainage of pericardial effusion   Abd: soft, NT, ND, + BS  Extremities:  no pretibial edema          HPI:  74 y/o male with PMH of kidney ca and colon ca s/p resection, HTN, came to the ED complaining of pleuritic chest pain. Patient said 7-8 days ago, he had a chest pressure on the left that woke him up from sleep, lasted few minutes and resolved without intervention. After started having chest pain with breathing, radiating to the back associated with shortness of breath. He also endorsed fever started 3 days ago. Of note, he has been having hematuria x 6 days. He has no palpitation, nausea, vomiting, abdominal pain, diarrhea, dysuria, sick contact.    (11 Dec 2021 20:23)  Patient underwent drainage of pericardial effusion and creation pericardial window on 10/17. Denies CP, SOB, Abdominal pain, nausea, vomitting, headache      PAST MEDICAL & SURGICAL HISTORY:  HTN (hypertension)  Neuropathy    Allergies    penicillin (Rash)    MEDICATIONS  (STANDING):  amLODIPine   Tablet 5 milliGRAM(s) Oral daily  carvedilol 6.25 milliGRAM(s) Oral every 12 hours  gabapentin 600 milliGRAM(s) Oral two times a day  heparin   Injectable 5000 Unit(s) SubCutaneous every 12 hours  hydrALAZINE 50 milliGRAM(s) Oral three times a day  influenza  Vaccine (HIGH DOSE) 0.7 milliLiter(s) IntraMuscular once  lactated ringers. 1000 milliLiter(s) (100 mL/Hr) IV Continuous <Continuous>  pantoprazole    Tablet 40 milliGRAM(s) Oral before breakfast  tamsulosin 0.4 milliGRAM(s) Oral at bedtime    MEDICATIONS  (PRN):  acetaminophen     Tablet .. 650 milliGRAM(s) Oral every 6 hours PRN Temp greater or equal to 38C (100.4F), Mild Pain (1 - 3)  ALBUTerol    90 MICROgram(s) HFA Inhaler 2 Puff(s) Inhalation every 6 hours PRN Shortness of Breath and/or Wheezing  HYDROmorphone  Injectable 0.5 milliGRAM(s) IV Push every 30 minutes PRN Severe Pain (7 - 10)    FAMILY HISTORY:  FHx: stomach cancer (Father)    Social History:  No cigarette, alcohol or illicit drug use. Visiting from [place], staying with daughter (11 Dec 2021 20:23)    ICU Vital Signs Last 24 Hrs  T(C): 36.7 (18 Dec 2021 04:00), Max: 36.9 (17 Dec 2021 21:11)  T(F): 98.1 (18 Dec 2021 04:00), Max: 98.5 (17 Dec 2021 21:11)  HR: 60 (18 Dec 2021 08:00) (55 - 92)  BP: 133/68 (18 Dec 2021 08:00) (120/79 - 178/59)  BP(mean): 105 (18 Dec 2021 06:00) (70 - 105)  ABP: --  ABP(mean): --  RR: 18 (18 Dec 2021 08:00) (14 - 22)  SpO2: 97% (18 Dec 2021 08:00) (93% - 100%)    |12-18-21 @ 08:13            11.2<L>  7.97>--------------<378            34.2<L>    PT 15.2<H> / INR 1.33<H>    PTT 32.7    I&O's Summary    17 Dec 2021 07:01  -  18 Dec 2021 07:00  --------------------------------------------------------  IN: 600 mL / OUT: 875 mL / NET: -275 mL    FARHAT drain : 65 cc sero-sanguinous fluid overnight    < from: Xray Chest 1 View- PORTABLE-Routine (Xray Chest 1 View- PORTABLE-Routine in AM.) (12.17.21 @ 05:39) >  ACC: [id number] EXAM:  XR CHEST PORTABLE ROUTINE 1V                          PROCEDURE DATE:  12/17/2021          INTERPRETATION:  Clinical history: Shortness of breath    Chest single view    Stable cardiomegaly. Stable right basilar atelectasis. Noevidence of   pneumothorax.    IMPRESSION: Stable findings as above    --- End of Report ---      SCOT WOFL MD; Attending Radiologist  This document has been electronically signed. Dec 18 2021 10:36AM    < end of copied text >    Physical Exam:  Patient offers no complaints  Neuro: A/O x 3, moves all extremities x 4 no focal motor or sensory defecits  Skin:  warm Dry  COR:  RRR, distant heart sounds  Lungs:  CTA bilaterally, FARHAT drain in place S/P creation of pericardial window and drainage of pericardial effusion   Abd: soft, NT, ND, + BS  Extremities:  no pretibial edema

## 2021-12-18 NOTE — PROGRESS NOTE ADULT - ATTENDING COMMENTS
74 yo M  male who is currently visiting his daughter from [place], Came to ER wiht hx of kidney ca and colon ca s/p resection, HTN, prior covid infection p/w sob and pleuritic chest pain x 7 days and hematuria x 6 days. patient report chest pain worse with breathing. Non-exersional. Hematuria started the day after. He had mild b/l flank pain when moving on his side. Patient denies palpitations. No fever or chills. Patient had a Chest CT which showed pericardial effusion. No PE.    Since admission, he had Cath which showed Mild CAD, Pericardiocentesis which showed Bloody fluid.  Patients repeat echo was reviewed today which still shows Moderate size pericardial effusion.  Patient had a brief run of PAF with RVR overnight.    Hem/Onc consult note seen    I have requested Cardiothoracic surgery to see him for a Possible Pericardial window.    For PAF with RVR- Patient cannot be anticoagulated, because he is risk for bleeding into pericardial space.  Patient is Coreg, if he still has PAF with RVR, may change to Metoprolol which may be better for rate control.    Recommendations:  1. D/C Colchcine.  2. Continue Coreg, may change to Metoprolol if needed  3. CT Surgery Consult.    Will sign off.    Discussed with Dr Schmitz.      4. Hem
Patient is feeling fine, he is post cath.  Patient has mild CAD.  I spoke to patients daughter who wants her father to get his Care in [place].   I have informed her that she needs to establish a PCP for her father.  Patients Pericardiac effusion was bloody which is suspicious for malignant effusion, she was informed her father needs to see an Hem/Oncologist in [place] if she wants him to get the care. I have recommended her to discuss arrangement for his care with the hospitalist.  Patient will get Limited 2 D echo on 12/14/2021 for f/u pericardial effusion.   If Patient has recurrent effusion, he may need Pericardial window.    I have discussed about the case with Dr Schmitz    Will follow
Rates control  will continue on current dosage of coreg.
: Laura Cevallos RN.    Patient had 14 beat WCT.  Patient states he had LHC about a year ago he was told "everything was OK"  Patient is feeling fine today.    Patient is getting abdominal U/S Kidney lesion noted on Abdominal CT    < from: TTE Echo Complete w/o Contrast w/ Doppler (12.12.21 @ 08:47) >      Summary:   1. Left ventricular ejection fraction, by visual estimation, is 55 to   60%.   2. Normal global left ventricular systolic function.   3. LV Ejection Fraction by Prince's Method with a biplane EF of 57 %.   4. Spectral Doppler shows impaired relaxation pattern of left   ventricular myocardial filling (Grade I diastolic dysfunction).   5. There is moderate concentric left ventricular hypertrophy.   6. Mildly enlarged left atrium.   7. Moderate pericardial effusion.   8. Mild mitral annular calcification.   9. Trace mitral valve regurgitation.  10. Mild tricuspid regurgitation.  11. Moderate aortic regurgitation.  12. Estimated pulmonary artery systolic pressure is 48.6 mmHg assuming a   right atrial pressure of 3 mmHg, which is consistent with mild pulmonary   hypertension.  13. There is respiratory variation of Mitral inflow >25%, RA diastolic   collapse, suggestive of early tamponade physiology.    MD Edin Electronically signed on 12/12/2021 at 12:56:34 PM      Patient with NSVT, has Pericardial effusion with early tamponade physiology, Stable hemodynamically now.    Recommendations:  Continue Indocin/Colchcine/PPI  For Right and Left heart Cath- Patient is agreeable- LORI Sanchez spoke to the patient    Will follow

## 2021-12-18 NOTE — PROGRESS NOTE ADULT - PROBLEM SELECTOR PLAN 1
S/P drainage and creation of pericardial window  CXR AM  Drain to remain to bulb suction one more day as per Dr. Tena

## 2021-12-18 NOTE — PROGRESS NOTE ADULT - SUBJECTIVE AND OBJECTIVE BOX
Patient is a 75y old  Male who presents with a chief complaint of Pericardial effusion (18 Dec 2021 11:21)    Pt rajesh any acute chest pain,sob,fever,chill. Pt states he has on/off chest incision side pain 3-4/10.    REVIEW OF SYSTEMS: All systems are reviewed and found to be negative except above    MEDICATIONS  (STANDING):  amLODIPine   Tablet 5 milliGRAM(s) Oral daily  carvedilol 6.25 milliGRAM(s) Oral every 12 hours  gabapentin 600 milliGRAM(s) Oral two times a day  heparin   Injectable 5000 Unit(s) SubCutaneous every 12 hours  hydrALAZINE 50 milliGRAM(s) Oral three times a day  influenza  Vaccine (HIGH DOSE) 0.7 milliLiter(s) IntraMuscular once  lactated ringers. 1000 milliLiter(s) (100 mL/Hr) IV Continuous <Continuous>  pantoprazole    Tablet 40 milliGRAM(s) Oral before breakfast  tamsulosin 0.4 milliGRAM(s) Oral at bedtime    MEDICATIONS  (PRN):  acetaminophen     Tablet .. 650 milliGRAM(s) Oral every 6 hours PRN Temp greater or equal to 38C (100.4F), Mild Pain (1 - 3)  ALBUTerol    90 MICROgram(s) HFA Inhaler 2 Puff(s) Inhalation every 6 hours PRN Shortness of Breath and/or Wheezing  HYDROmorphone  Injectable 0.5 milliGRAM(s) IV Push every 30 minutes PRN Severe Pain (7 - 10)      CAPILLARY BLOOD GLUCOSE        I&O's Summary    17 Dec 2021 07:01  -  18 Dec 2021 07:00  --------------------------------------------------------  IN: 600 mL / OUT: 875 mL / NET: -275 mL    18 Dec 2021 07:01  -  18 Dec 2021 13:32  --------------------------------------------------------  IN: 920 mL / OUT: 30 mL / NET: 890 mL        PHYSICAL EXAM:  Vital Signs Last 24 Hrs  T(C): 36.7 (18 Dec 2021 04:00), Max: 36.9 (17 Dec 2021 21:11)  T(F): 98.1 (18 Dec 2021 04:00), Max: 98.5 (17 Dec 2021 21:11)  HR: 60 (18 Dec 2021 08:00) (55 - 77)  BP: 133/68 (18 Dec 2021 08:00) (133/68 - 178/59)  BP(mean): 105 (18 Dec 2021 06:00) (70 - 105)  RR: 18 (18 Dec 2021 08:00) (14 - 22)  SpO2: 97% (18 Dec 2021 08:00) (93% - 100%)    CONSTITUTIONAL: NAD,  EYES: PERRLA; conjunctiva and sclera clear  ENMT: Moist oral mucosa,   RESPIRATORY: Normal respiratory effort; lungs are clear to auscultation bilaterally. bandage present lower mid chest wall w/drainage tube  CARDIOVASCULAR: Regular rate and rhythm, normal S1 and S2, no murmur   EXTS: No lower extremity edema; Peripheral pulses are 2+ bilaterally  ABDOMEN: Nontender to palpation, normoactive bowel sounds, no rebound/guarding;   MUSCLOSKELETAL:   no  cyanosis of digits; no joint swelling or tenderness to palpation  PSYCH: affect appropriate  NEUROLOGY: A+O to person, place, and time; no gross sensory/motor deficits;       LABS:                        11.2   7.97  )-----------( 378      ( 18 Dec 2021 08:13 )             34.2     12-18    133<L>  |  98  |  19.6  ----------------------------<  111<H>  4.7   |  25.0  |  0.80    Ca    8.0<L>      18 Dec 2021 06:08  Phos  3.2     12-18  Mg     2.3     12-18    TPro  6.4<L>  /  Alb  2.6<L>  /  TBili  0.4  /  DBili  x   /  AST  25  /  ALT  24  /  AlkPhos  222<H>  12-18    PT/INR - ( 18 Dec 2021 08:13 )   PT: 15.2 sec;   INR: 1.33 ratio         PTT - ( 18 Dec 2021 08:13 )  PTT:32.7 sec          Culture - Fungal, Body Fluid (collected 18 Dec 2021 00:20)  Source: .Body Fluid pericardial fluid  Preliminary Report (18 Dec 2021 13:00):    Testing in progress    Culture - Body Fluid with Gram Stain (collected 18 Dec 2021 00:20)  Source: .Body Fluid pericardial fluid  Gram Stain (18 Dec 2021 03:53):    polymorphonuclear leukocytes seen    No organisms seen    by cytocentrifuge        RADIOLOGY & ADDITIONAL TESTS:  Results Reviewed:

## 2021-12-19 LAB
ANION GAP SERPL CALC-SCNC: 12 MMOL/L — SIGNIFICANT CHANGE UP (ref 5–17)
BASOPHILS # BLD AUTO: 0.03 K/UL — SIGNIFICANT CHANGE UP (ref 0–0.2)
BASOPHILS NFR BLD AUTO: 0.5 % — SIGNIFICANT CHANGE UP (ref 0–2)
BUN SERPL-MCNC: 11.8 MG/DL — SIGNIFICANT CHANGE UP (ref 8–20)
CALCIUM SERPL-MCNC: 8.5 MG/DL — LOW (ref 8.6–10.2)
CHLORIDE SERPL-SCNC: 98 MMOL/L — SIGNIFICANT CHANGE UP (ref 98–107)
CO2 SERPL-SCNC: 27 MMOL/L — SIGNIFICANT CHANGE UP (ref 22–29)
CREAT SERPL-MCNC: 0.83 MG/DL — SIGNIFICANT CHANGE UP (ref 0.5–1.3)
EOSINOPHIL # BLD AUTO: 0.21 K/UL — SIGNIFICANT CHANGE UP (ref 0–0.5)
EOSINOPHIL NFR BLD AUTO: 3.3 % — SIGNIFICANT CHANGE UP (ref 0–6)
GLUCOSE SERPL-MCNC: 90 MG/DL — SIGNIFICANT CHANGE UP (ref 70–99)
HCT VFR BLD CALC: 34 % — LOW (ref 39–50)
HGB BLD-MCNC: 11.1 G/DL — LOW (ref 13–17)
IMM GRANULOCYTES NFR BLD AUTO: 0.8 % — SIGNIFICANT CHANGE UP (ref 0–1.5)
LYMPHOCYTES # BLD AUTO: 0.83 K/UL — LOW (ref 1–3.3)
LYMPHOCYTES # BLD AUTO: 13.2 % — SIGNIFICANT CHANGE UP (ref 13–44)
MAGNESIUM SERPL-MCNC: 2 MG/DL — SIGNIFICANT CHANGE UP (ref 1.8–2.6)
MCHC RBC-ENTMCNC: 29.8 PG — SIGNIFICANT CHANGE UP (ref 27–34)
MCHC RBC-ENTMCNC: 32.6 GM/DL — SIGNIFICANT CHANGE UP (ref 32–36)
MCV RBC AUTO: 91.4 FL — SIGNIFICANT CHANGE UP (ref 80–100)
MONOCYTES # BLD AUTO: 0.76 K/UL — SIGNIFICANT CHANGE UP (ref 0–0.9)
MONOCYTES NFR BLD AUTO: 12.1 % — SIGNIFICANT CHANGE UP (ref 2–14)
NEUTROPHILS # BLD AUTO: 4.42 K/UL — SIGNIFICANT CHANGE UP (ref 1.8–7.4)
NEUTROPHILS NFR BLD AUTO: 70.1 % — SIGNIFICANT CHANGE UP (ref 43–77)
PLATELET # BLD AUTO: 403 K/UL — HIGH (ref 150–400)
POTASSIUM SERPL-MCNC: 4.4 MMOL/L — SIGNIFICANT CHANGE UP (ref 3.5–5.3)
POTASSIUM SERPL-SCNC: 4.4 MMOL/L — SIGNIFICANT CHANGE UP (ref 3.5–5.3)
RBC # BLD: 3.72 M/UL — LOW (ref 4.2–5.8)
RBC # FLD: 12.1 % — SIGNIFICANT CHANGE UP (ref 10.3–14.5)
SODIUM SERPL-SCNC: 136 MMOL/L — SIGNIFICANT CHANGE UP (ref 135–145)
WBC # BLD: 6.3 K/UL — SIGNIFICANT CHANGE UP (ref 3.8–10.5)
WBC # FLD AUTO: 6.3 K/UL — SIGNIFICANT CHANGE UP (ref 3.8–10.5)

## 2021-12-19 PROCEDURE — 99232 SBSQ HOSP IP/OBS MODERATE 35: CPT

## 2021-12-19 PROCEDURE — 71045 X-RAY EXAM CHEST 1 VIEW: CPT | Mod: 26,76

## 2021-12-19 PROCEDURE — 99231 SBSQ HOSP IP/OBS SF/LOW 25: CPT

## 2021-12-19 PROCEDURE — 99024 POSTOP FOLLOW-UP VISIT: CPT

## 2021-12-19 RX ADMIN — GABAPENTIN 600 MILLIGRAM(S): 400 CAPSULE ORAL at 05:06

## 2021-12-19 RX ADMIN — CARVEDILOL PHOSPHATE 6.25 MILLIGRAM(S): 80 CAPSULE, EXTENDED RELEASE ORAL at 17:34

## 2021-12-19 RX ADMIN — Medication 50 MILLIGRAM(S): at 21:36

## 2021-12-19 RX ADMIN — Medication 50 MILLIGRAM(S): at 13:34

## 2021-12-19 RX ADMIN — Medication 50 MILLIGRAM(S): at 05:06

## 2021-12-19 RX ADMIN — PANTOPRAZOLE SODIUM 40 MILLIGRAM(S): 20 TABLET, DELAYED RELEASE ORAL at 05:07

## 2021-12-19 RX ADMIN — HEPARIN SODIUM 5000 UNIT(S): 5000 INJECTION INTRAVENOUS; SUBCUTANEOUS at 09:35

## 2021-12-19 RX ADMIN — CARVEDILOL PHOSPHATE 6.25 MILLIGRAM(S): 80 CAPSULE, EXTENDED RELEASE ORAL at 09:29

## 2021-12-19 RX ADMIN — TAMSULOSIN HYDROCHLORIDE 0.4 MILLIGRAM(S): 0.4 CAPSULE ORAL at 21:36

## 2021-12-19 RX ADMIN — AMLODIPINE BESYLATE 5 MILLIGRAM(S): 2.5 TABLET ORAL at 09:29

## 2021-12-19 RX ADMIN — HEPARIN SODIUM 5000 UNIT(S): 5000 INJECTION INTRAVENOUS; SUBCUTANEOUS at 21:36

## 2021-12-19 RX ADMIN — GABAPENTIN 600 MILLIGRAM(S): 400 CAPSULE ORAL at 17:34

## 2021-12-19 NOTE — PROGRESS NOTE ADULT - SUBJECTIVE AND OBJECTIVE BOX
Patient is a 75y old  Male who presents with a chief complaint of Pericardial effusion (19 Dec 2021 09:25)  Pt denies cp,sob(on 2l nc), cough,fever,chill,dizziness.palpitation.  REVIEW OF SYSTEMS: All systems are reviewed and found to be negative except above    MEDICATIONS  (STANDING):  amLODIPine   Tablet 5 milliGRAM(s) Oral daily  carvedilol 6.25 milliGRAM(s) Oral every 12 hours  gabapentin 600 milliGRAM(s) Oral two times a day  heparin   Injectable 5000 Unit(s) SubCutaneous every 12 hours  hydrALAZINE 50 milliGRAM(s) Oral three times a day  influenza  Vaccine (HIGH DOSE) 0.7 milliLiter(s) IntraMuscular once  lactated ringers. 1000 milliLiter(s) (100 mL/Hr) IV Continuous <Continuous>  pantoprazole    Tablet 40 milliGRAM(s) Oral before breakfast  tamsulosin 0.4 milliGRAM(s) Oral at bedtime    MEDICATIONS  (PRN):  acetaminophen     Tablet .. 650 milliGRAM(s) Oral every 6 hours PRN Temp greater or equal to 38C (100.4F), Mild Pain (1 - 3)  ALBUTerol    90 MICROgram(s) HFA Inhaler 2 Puff(s) Inhalation every 6 hours PRN Shortness of Breath and/or Wheezing  morphine  - Injectable 2 milliGRAM(s) IV Push every 4 hours PRN Severe Pain (7 - 10)  oxyCODONE    IR 5 milliGRAM(s) Oral every 4 hours PRN Moderate Pain (4 - 6)      CAPILLARY BLOOD GLUCOSE        I&O's Summary    18 Dec 2021 07:01  -  19 Dec 2021 07:00  --------------------------------------------------------  IN: 2220 mL / OUT: 1402.5 mL / NET: 817.5 mL    19 Dec 2021 07:01  -  19 Dec 2021 11:29  --------------------------------------------------------  IN: 500 mL / OUT: 400 mL / NET: 100 mL        PHYSICAL EXAM:  Vital Signs Last 24 Hrs  T(C): 37 (19 Dec 2021 09:18), Max: 37.3 (19 Dec 2021 04:50)  T(F): 98.6 (19 Dec 2021 09:18), Max: 99.2 (19 Dec 2021 04:50)  HR: 72 (19 Dec 2021 09:18) (58 - 72)  BP: 151/61 (19 Dec 2021 09:18) (151/61 - 188/76)  BP(mean): 91 (19 Dec 2021 09:18) (91 - 91)  RR: 17 (19 Dec 2021 09:18) (17 - 18)  SpO2: 96% (19 Dec 2021 09:18) (95% - 99%)    CONSTITUTIONAL: NAD,  EYES: PERRLA; conjunctiva and sclera clear  ENMT: Moist oral mucosa,   RESPIRATORY: Normal respiratory effort; lungs are clear to auscultation bilaterally  CARDIOVASCULAR: Regular rate and rhythm, normal S1 and S2, no murmur . mario qureshi present-no draine present  EXTS: No lower extremity edema; Peripheral pulses are 2+ bilaterally  ABDOMEN: Nontender to palpation, normoactive bowel sounds, no rebound/guarding;   MUSCLOSKELETAL:   no   cyanosis of digits; no joint swelling or tenderness to palpation  PSYCH: affect appropriate  NEUROLOGY: A+O to person, place, and time;  no gross sensory/motor deficits;       LABS:                        11.1   6.30  )-----------( 403      ( 19 Dec 2021 07:28 )             34.0     12-19    136  |  98  |  11.8  ----------------------------<  90  4.4   |  27.0  |  0.83    Ca    8.5<L>      19 Dec 2021 07:28  Phos  3.2     12-18  Mg     2.0     12-19    TPro  6.4<L>  /  Alb  2.6<L>  /  TBili  0.4  /  DBili  x   /  AST  25  /  ALT  24  /  AlkPhos  222<H>  12-18    PT/INR - ( 18 Dec 2021 08:13 )   PT: 15.2 sec;   INR: 1.33 ratio         PTT - ( 18 Dec 2021 08:13 )  PTT:32.7 sec          Culture - Fungal, Body Fluid (collected 18 Dec 2021 00:20)  Source: .Body Fluid pericardial fluid  Preliminary Report (18 Dec 2021 13:00):    Testing in progress    Culture - Acid Fast - Body Fluid w/Smear (collected 18 Dec 2021 00:20)  Source: .Body Fluid pericardial fluid    Culture - Body Fluid with Gram Stain (collected 18 Dec 2021 00:20)  Source: .Body Fluid pericardial fluid  Gram Stain (18 Dec 2021 03:53):    polymorphonuclear leukocytes seen    No organisms seen    by cytocentrifuge  Preliminary Report (19 Dec 2021 11:12):    No growth to date.        RADIOLOGY & ADDITIONAL TESTS:  Results Reviewed:

## 2021-12-19 NOTE — PROGRESS NOTE ADULT - REASON FOR ADMISSION
Pericardial effusion
Pericardial effusion
per hpi
pericardial effusion
Pericardial Effusion
SOB
SOB
pericardial effusion
pericardial effusion
SOB
pericardial effusion
pericardial effusion

## 2021-12-19 NOTE — PROGRESS NOTE ADULT - ASSESSMENT
76 y/o female with PMH of kidney ca and colon ca s/p resection, HTN, came to the ED complaining of pleuritic chest pain. Patient said 7-8 days ago, he had a chest pressure on the left that woke him up from sleep, lasted few minutes and resolved without intervention. After started having chest pain with breathing, radiating to the back associated with shortness of breath. He also endorsed fever started 3 days ago. Of note, he has been having hematuria x 6 days. CT chest/abdomen/pelvis with contrast: No pulmonary embolus. Enlarged prostate. Moderately large pericardial effusion with evidence of right-sided congestive heart failure.    Moderate Pericardial effusion S/P drainage and creation of pericardial window2/17  - Drainage tube present. 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  Continue Hydralazine, coreg and norvasc    LINDEN on CKD Stage 2 - resolved  Likely prerenal azotemia   s/p cardiac cath   UA noted; f/u urine culture  continue to hold ACE and NSAIDS  CT with renal lesion; renal/bladder US without hydro,f/u urology out pt    BPH  continue Flomax      Neuropathy   Continue Gabapentin BID    NSVT  continue coreg  s/p cath with mild CAD    History of Colon CA s/p resection and Renal CA  -CT c/a/p reviewed; renal lesion noted which was not seen on sono  -given bloody pericardial effusion; high suspicion for malignancy  -hem/onc consult appreciated - outpatient follow up    DVT prophylaxis: SCDs given bloody pericardial effusion  Disposition: when clear by cardiology  care of plan dw pt   rmoi rn

## 2021-12-19 NOTE — PROGRESS NOTE ADULT - SUBJECTIVE AND OBJECTIVE BOX
EP follow up for pause/pAF  76 y/o with PMH of kidney ca and colon ca s/p resection, HTN, came to the ED complaining of pleuritic chest pain.   Large pericardial effusion s/p pericardial drainage  doing well now  Had pAf and conversion pause - no recurrence on low dose coreg.       MEDICATIONS  (STANDING):  amLODIPine   Tablet 5 milliGRAM(s) Oral daily  carvedilol 6.25 milliGRAM(s) Oral every 12 hours  gabapentin 600 milliGRAM(s) Oral two times a day  heparin   Injectable 5000 Unit(s) SubCutaneous every 12 hours  hydrALAZINE 50 milliGRAM(s) Oral three times a day  influenza  Vaccine (HIGH DOSE) 0.7 milliLiter(s) IntraMuscular once  lactated ringers. 1000 milliLiter(s) (100 mL/Hr) IV Continuous <Continuous>  pantoprazole    Tablet 40 milliGRAM(s) Oral before breakfast  tamsulosin 0.4 milliGRAM(s) Oral at bedtime    MEDICATIONS  (PRN):  acetaminophen     Tablet .. 650 milliGRAM(s) Oral every 6 hours PRN Temp greater or equal to 38C (100.4F), Mild Pain (1 - 3)  ALBUTerol    90 MICROgram(s) HFA Inhaler 2 Puff(s) Inhalation every 6 hours PRN Shortness of Breath and/or Wheezing  morphine  - Injectable 2 milliGRAM(s) IV Push every 4 hours PRN Severe Pain (7 - 10)  oxyCODONE    IR 5 milliGRAM(s) Oral every 4 hours PRN Moderate Pain (4 - 6)            Vital Signs Last 24 Hrs  T(C): 36.9 (19 Dec 2021 13:15), Max: 37.3 (19 Dec 2021 04:50)  T(F): 98.5 (19 Dec 2021 13:15), Max: 99.2 (19 Dec 2021 04:50)  HR: 56 (19 Dec 2021 13:15) (56 - 72)  BP: 153/59 (19 Dec 2021 13:15) (151/61 - 188/76)  BP(mean): 90 (19 Dec 2021 13:15) (90 - 91)  RR: 18 (19 Dec 2021 13:15) (17 - 18)  SpO2: 96% (19 Dec 2021 13:15) (95% - 99%)    Daily     Daily     I&O's Detail    18 Dec 2021 07:01  -  19 Dec 2021 07:00  --------------------------------------------------------  IN:    Lactated Ringers: 1800 mL    Oral Fluid: 420 mL  Total IN: 2220 mL    OUT:    Drain (mL): 52.5 mL    Voided (mL): 1350 mL  Total OUT: 1402.5 mL    Total NET: 817.5 mL      19 Dec 2021 07:01  -  19 Dec 2021 15:06  --------------------------------------------------------  IN:    Lactated Ringers: 800 mL  Total IN: 800 mL    OUT:    Voided (mL): 550 mL  Total OUT: 550 mL    Total NET: 250 mL          PHYSICAL EXAM:  Appearance: nad  Cardiovascular: S1 S2 . no rub.   Respiratory: Lungs clear to auscultation	  Gastrointestinal:  Soft, Non-tender  Extremities: No clubbing, cyanosis or edema  Vascular: Peripheral pulses palpable 2+ bilaterally      INTERPRETATION OF TELEMETRY: SR    ECG:    LABS:                        11.1   6.30  )-----------( 403      ( 19 Dec 2021 07:28 )             34.0     12-19    136  |  98  |  11.8  ----------------------------<  90  4.4   |  27.0  |  0.83    Ca    8.5<L>      19 Dec 2021 07:28  Phos  3.2     12-18  Mg     2.0     12-19    TPro  6.4<L>  /  Alb  2.6<L>  /  TBili  0.4  /  DBili  x   /  AST  25  /  ALT  24  /  AlkPhos  222<H>  12-18          PT/INR - ( 18 Dec 2021 08:13 )   PT: 15.2 sec;   INR: 1.33 ratio         PTT - ( 18 Dec 2021 08:13 )  PTT:32.7 sec    I&O's Summary    18 Dec 2021 07:01  -  19 Dec 2021 07:00  --------------------------------------------------------  IN: 2220 mL / OUT: 1402.5 mL / NET: 817.5 mL    19 Dec 2021 07:01  -  19 Dec 2021 15:06  --------------------------------------------------------  IN: 800 mL / OUT: 550 mL / NET: 250 mL      BNP  RADIOLOGY & ADDITIONAL STUDIES:

## 2021-12-19 NOTE — PROGRESS NOTE ADULT - PROBLEM SELECTOR PLAN 1
S/P drainage pericardial effusion and creation of pericardial window  Will D/C FARHAT drain this morning  LABS: H/H, K+, Mg++ and kidney function all normal  Probably home in AM as per Attending decision

## 2021-12-19 NOTE — PROGRESS NOTE ADULT - ASSESSMENT
HPI:  76 y/o male with PMH of kidney ca and colon ca s/p resection, HTN, came to the ED complaining of pleuritic chest pain. Patient said 7-8 days ago, he had a chest pressure on the left that woke him up from sleep, lasted few minutes and resolved without intervention. After started having chest pain with breathing, radiating to the back associated with shortness of breath. He also endorsed fever started 3 days ago. Of note, he has been having hematuria x 6 days. He has no palpitation, nausea, vomiting, abdominal pain, diarrhea, dysuria, sick contact.   Patient admitted 12/11 with pericardial effusion: 12/12: 400 cc effusion drained by interventional cardiology.  12/14 TTE/CT revealed reaccumulation of fluid and on 12/17 patient underwent drainage of effusion, creation of pericardial window and FARHAT drain placement.  Patient c/o mild incisional pain. Denies SOB, CP, N/V, headache, dizziness or fatigue.

## 2021-12-19 NOTE — PROGRESS NOTE ADULT - SUBJECTIVE AND OBJECTIVE BOX
HPI:  76 y/o male with PMH of kidney ca and colon ca s/p resection, HTN, came to the ED complaining of pleuritic chest pain. Patient said 7-8 days ago, he had a chest pressure on the left that woke him up from sleep, lasted few minutes and resolved without intervention. After started having chest pain with breathing, radiating to the back associated with shortness of breath. He also endorsed fever started 3 days ago. Of note, he has been having hematuria x 6 days. He has no palpitation, nausea, vomiting, abdominal pain, diarrhea, dysuria, sick contact.    (11 Dec 2021 20:23)    PAST MEDICAL & SURGICAL HISTORY:  HTN (hypertension)    Neuropathy    Allergies    penicillin (Rash)    Intolerances    MEDICATIONS  (STANDING):  amLODIPine   Tablet 5 milliGRAM(s) Oral daily  carvedilol 6.25 milliGRAM(s) Oral every 12 hours  gabapentin 600 milliGRAM(s) Oral two times a day  heparin   Injectable 5000 Unit(s) SubCutaneous every 12 hours  hydrALAZINE 50 milliGRAM(s) Oral three times a day  influenza  Vaccine (HIGH DOSE) 0.7 milliLiter(s) IntraMuscular once  lactated ringers. 1000 milliLiter(s) (100 mL/Hr) IV Continuous <Continuous>  pantoprazole    Tablet 40 milliGRAM(s) Oral before breakfast  tamsulosin 0.4 milliGRAM(s) Oral at bedtime    MEDICATIONS  (PRN):  acetaminophen     Tablet .. 650 milliGRAM(s) Oral every 6 hours PRN Temp greater or equal to 38C (100.4F), Mild Pain (1 - 3)  ALBUTerol    90 MICROgram(s) HFA Inhaler 2 Puff(s) Inhalation every 6 hours PRN Shortness of Breath and/or Wheezing  morphine  - Injectable 2 milliGRAM(s) IV Push every 4 hours PRN Severe Pain (7 - 10)  oxyCODONE    IR 5 milliGRAM(s) Oral every 4 hours PRN Moderate Pain (4 - 6)    Social History:  No cigarette, alcohol or illicit drug use. Visiting from [place], staying with daughter (11 Dec 2021 20:23)    FAMILY HISTORY:  FHx: stomach cancer (Father)    |12-19-21 @ 07:28            11.1<L>  6.30>--------------<403<H>            34.0<L>      136  |  98  |  11.8  ----------------------------<90  4.4  |  8.5<L>  |  0.83    Mg 2.0 / Phos--    < from: Xray Chest 1 View- PORTABLE-Routine (Xray Chest 1 View- PORTABLE-Routine in AM.) (12.17.21 @ 05:39) >  ACC: [id number] EXAM:  XR CHEST PORTABLE ROUTINE 1V                          PROCEDURE DATE:  12/17/2021          INTERPRETATION:  Clinical history: Shortness of breath    Chest single view    Stable cardiomegaly. Stable right basilar atelectasis. Noevidence of   pneumothorax.    IMPRESSION: Stable findings as above    --- End of Report ---      SCOT WOLF MD; Attending Radiologist  This document has been electronically signed. Dec 18 2021 10:36AM    < end of copied text >    ICU Vital Signs Last 24 Hrs  T(C): 37.3 (19 Dec 2021 04:50), Max: 37.3 (19 Dec 2021 04:50)  T(F): 99.2 (19 Dec 2021 04:50), Max: 99.2 (19 Dec 2021 04:50)  HR: 59 (19 Dec 2021 05:54) (58 - 67)  BP: 178/71 (19 Dec 2021 05:54) (153/66 - 188/76)  BP(mean): --  ABP: --  ABP(mean): --  RR: 18 (19 Dec 2021 04:50) (17 - 18)  SpO2: 95% (19 Dec 2021 04:50) (95% - 99%)    I&O's Detail    18 Dec 2021 07:01  -  19 Dec 2021 07:00  --------------------------------------------------------  IN:    Lactated Ringers: 1700 mL    Oral Fluid: 420 mL  Total IN: 2120 mL    OUT:    Drain (mL): 52.5 mL    Voided (mL): 1350 mL  Total OUT: 1402.5 mL    Total NET: 717.5 mL    Physical exam:  Patient c/o some mild incisional pain where FARHAT drain exits. Otherwise no other complaints  Neuro:  A/O x 3  skin: warm and dry.  Incisional dressing C/D/I. tenderness to palpation  Lungs CTA  COR: s1s2 RRR no appreciable M/R/G   Abd: mildly obese, +BS, non tender and not distended, no tenderness to palpation  Extensities 1+ bilateral lower extremity edema

## 2021-12-19 NOTE — PROGRESS NOTE ADULT - ASSESSMENT
He is doing well with no drainage  Telemetry - SR with no recurrence of pauses  Continue on low dose coreg and hold off with AC for now.

## 2021-12-20 ENCOUNTER — TRANSCRIPTION ENCOUNTER (OUTPATIENT)
Age: 76
End: 2021-12-20

## 2021-12-20 VITALS — SYSTOLIC BLOOD PRESSURE: 149 MMHG | DIASTOLIC BLOOD PRESSURE: 66 MMHG

## 2021-12-20 LAB
ANION GAP SERPL CALC-SCNC: 9 MMOL/L — SIGNIFICANT CHANGE UP (ref 5–17)
BUN SERPL-MCNC: 9.3 MG/DL — SIGNIFICANT CHANGE UP (ref 8–20)
CALCIUM SERPL-MCNC: 8.5 MG/DL — LOW (ref 8.6–10.2)
CHLORIDE SERPL-SCNC: 98 MMOL/L — SIGNIFICANT CHANGE UP (ref 98–107)
CO2 SERPL-SCNC: 27 MMOL/L — SIGNIFICANT CHANGE UP (ref 22–29)
CREAT SERPL-MCNC: 0.76 MG/DL — SIGNIFICANT CHANGE UP (ref 0.5–1.3)
GLUCOSE SERPL-MCNC: 87 MG/DL — SIGNIFICANT CHANGE UP (ref 70–99)
HCT VFR BLD CALC: 33.3 % — LOW (ref 39–50)
HGB BLD-MCNC: 11.1 G/DL — LOW (ref 13–17)
MAGNESIUM SERPL-MCNC: 1.9 MG/DL — SIGNIFICANT CHANGE UP (ref 1.6–2.6)
MCHC RBC-ENTMCNC: 29.8 PG — SIGNIFICANT CHANGE UP (ref 27–34)
MCHC RBC-ENTMCNC: 33.3 GM/DL — SIGNIFICANT CHANGE UP (ref 32–36)
MCV RBC AUTO: 89.3 FL — SIGNIFICANT CHANGE UP (ref 80–100)
PLATELET # BLD AUTO: 411 K/UL — HIGH (ref 150–400)
POTASSIUM SERPL-MCNC: 4.3 MMOL/L — SIGNIFICANT CHANGE UP (ref 3.5–5.3)
POTASSIUM SERPL-SCNC: 4.3 MMOL/L — SIGNIFICANT CHANGE UP (ref 3.5–5.3)
RBC # BLD: 3.73 M/UL — LOW (ref 4.2–5.8)
RBC # FLD: 11.9 % — SIGNIFICANT CHANGE UP (ref 10.3–14.5)
SODIUM SERPL-SCNC: 134 MMOL/L — LOW (ref 135–145)
WBC # BLD: 5.68 K/UL — SIGNIFICANT CHANGE UP (ref 3.8–10.5)
WBC # FLD AUTO: 5.68 K/UL — SIGNIFICANT CHANGE UP (ref 3.8–10.5)

## 2021-12-20 PROCEDURE — 71045 X-RAY EXAM CHEST 1 VIEW: CPT | Mod: 26

## 2021-12-20 PROCEDURE — 99024 POSTOP FOLLOW-UP VISIT: CPT

## 2021-12-20 PROCEDURE — 99239 HOSP IP/OBS DSCHRG MGMT >30: CPT

## 2021-12-20 RX ORDER — AMLODIPINE BESYLATE 2.5 MG/1
1 TABLET ORAL
Qty: 0 | Refills: 0 | DISCHARGE
Start: 2021-12-20

## 2021-12-20 RX ORDER — HYDRALAZINE HCL 50 MG
1 TABLET ORAL
Qty: 90 | Refills: 0
Start: 2021-12-20 | End: 2022-01-18

## 2021-12-20 RX ORDER — CHLORTHALIDONE 50 MG
1 TABLET ORAL
Qty: 0 | Refills: 0 | DISCHARGE

## 2021-12-20 RX ORDER — AMLODIPINE BESYLATE 2.5 MG/1
1 TABLET ORAL
Qty: 30 | Refills: 0
Start: 2021-12-20 | End: 2022-01-18

## 2021-12-20 RX ORDER — LISINOPRIL 2.5 MG/1
1 TABLET ORAL
Qty: 0 | Refills: 0 | DISCHARGE

## 2021-12-20 RX ORDER — TAMSULOSIN HYDROCHLORIDE 0.4 MG/1
1 CAPSULE ORAL
Qty: 30 | Refills: 0
Start: 2021-12-20 | End: 2022-01-18

## 2021-12-20 RX ORDER — TAMSULOSIN HYDROCHLORIDE 0.4 MG/1
1 CAPSULE ORAL
Qty: 0 | Refills: 0 | DISCHARGE
Start: 2021-12-20

## 2021-12-20 RX ORDER — HYDRALAZINE HCL 50 MG
1 TABLET ORAL
Qty: 0 | Refills: 0 | DISCHARGE
Start: 2021-12-20

## 2021-12-20 RX ADMIN — Medication 50 MILLIGRAM(S): at 06:04

## 2021-12-20 RX ADMIN — Medication 50 MILLIGRAM(S): at 13:15

## 2021-12-20 RX ADMIN — CARVEDILOL PHOSPHATE 6.25 MILLIGRAM(S): 80 CAPSULE, EXTENDED RELEASE ORAL at 06:04

## 2021-12-20 RX ADMIN — PANTOPRAZOLE SODIUM 40 MILLIGRAM(S): 20 TABLET, DELAYED RELEASE ORAL at 06:04

## 2021-12-20 RX ADMIN — HEPARIN SODIUM 5000 UNIT(S): 5000 INJECTION INTRAVENOUS; SUBCUTANEOUS at 08:32

## 2021-12-20 RX ADMIN — GABAPENTIN 600 MILLIGRAM(S): 400 CAPSULE ORAL at 06:04

## 2021-12-20 RX ADMIN — CARVEDILOL PHOSPHATE 6.25 MILLIGRAM(S): 80 CAPSULE, EXTENDED RELEASE ORAL at 16:46

## 2021-12-20 RX ADMIN — AMLODIPINE BESYLATE 5 MILLIGRAM(S): 2.5 TABLET ORAL at 06:04

## 2021-12-20 NOTE — DISCHARGE NOTE PROVIDER - HOSPITAL COURSE
74 y/o male with PMH of kidney ca and colon ca s/p resection, HTN, presented with intermittent worsening pleuritic chest pain for past week which then began most recently radiating to the back associated with shortness of breath. Patient admitted 12/11 with pericardial effusion, in which interventional cardiology drained 400 cc on 12/12.  12/14 TTE/CT revealed reaccumulation of fluid and on 12/17 patient had pericardial window with FARHAT drain placement with Dr. Tena. FARHAT drain since removed 12/19. Repeat TTE  with ****.  Pt remains hemodynamically stable and determined stable for discharge  as per Dr. Tena and EP.    74 y/o male with PMH of kidney ca and colon ca s/p resection, HTN, presented with intermittent worsening pleuritic chest pain for past week which then began most recently radiating to the back associated with shortness of breath. Patient admitted 12/11 with pericardial effusion, in which interventional cardiology drained 400 cc on 12/12.  12/14 TTE/CT revealed reaccumulation of fluid and on 12/17 patient had pericardial window with FARHAT drain placement with Dr. Tena. FARHAT drain since removed 12/19. Repeat TTE as stated below  Pt remains hemodynamically stable and determined stable for discharge  as per Dr. Tena, EP and Medicine team .     < from: TTE Echo Limited or F/U (12.20.21 @ 11:21) >        Summary:   1. Left ventricular ejection fraction, by visual estimation, is 50 to   55%.   2. Normal global left ventricular systolic function.   3. Small pericardial effusion.   4. Small circumferential pericardial effusion with mild MV inflow   variation without wilian sign of chamber collapse (suboptimal imaging).   5. Moderate aortic regurgitation.    MD Lencho Electronically signed on 12/20/2021 at 12:50:55 PM    < end of copied text >

## 2021-12-20 NOTE — PROGRESS NOTE ADULT - SUBJECTIVE AND OBJECTIVE BOX
SUBJECTIVE:  Pt oob ot the chair having breakfast, pt states, " I am doing ok"  Patient denies acute pain with radiating or aggravating factors.  He denies chest pain, shortness of breath, palpitations, headache, dizziness, nausea, or vomiting.    no overnight events       PAST MEDICAL & SURGICAL HISTORY:  HTN (hypertension)    Neuropathy        MEDICATIONS  acetaminophen     Tablet .. 650 milliGRAM(s) Oral every 6 hours PRN  ALBUTerol    90 MICROgram(s) HFA Inhaler 2 Puff(s) Inhalation every 6 hours PRN  amLODIPine   Tablet 5 milliGRAM(s) Oral daily  carvedilol 6.25 milliGRAM(s) Oral every 12 hours  gabapentin 600 milliGRAM(s) Oral two times a day  heparin   Injectable 5000 Unit(s) SubCutaneous every 12 hours  hydrALAZINE 50 milliGRAM(s) Oral three times a day  influenza  Vaccine (HIGH DOSE) 0.7 milliLiter(s) IntraMuscular once  morphine  - Injectable 2 milliGRAM(s) IV Push every 4 hours PRN  oxyCODONE    IR 5 milliGRAM(s) Oral every 4 hours PRN  pantoprazole    Tablet 40 milliGRAM(s) Oral before breakfast  tamsulosin 0.4 milliGRAM(s) Oral at bedtime  MEDICATIONS  (PRN):  acetaminophen     Tablet .. 650 milliGRAM(s) Oral every 6 hours PRN Temp greater or equal to 38C (100.4F), Mild Pain (1 - 3)  ALBUTerol    90 MICROgram(s) HFA Inhaler 2 Puff(s) Inhalation every 6 hours PRN Shortness of Breath and/or Wheezing  morphine  - Injectable 2 milliGRAM(s) IV Push every 4 hours PRN Severe Pain (7 - 10)  oxyCODONE    IR 5 milliGRAM(s) Oral every 4 hours PRN Moderate Pain (4 - 6)      Daily     Daily Weight in k.9 (20 Dec 2021 06:02)                              11.1   5.68  )-----------( 411      ( 20 Dec 2021 05:39 )             33.3   12-20    134<L>  |  98  |  9.3  ----------------------------<  87  4.3   |  27.0  |  0.76    Ca    8.5<L>      20 Dec 2021 05:39  Mg     1.9     12-20              Objective:  T(C): 37.1 (21 @ 06:02), Max: 37.1 (21 @ 06:02)  HR: 64 (21 @ 06:02) (56 - 72)  BP: 158/60 (21 @ 07:55) (151/61 - 182/64)  RR: 17 (21 @ 06:02) (17 - 18)  SpO2: 97% (21 @ 06:02) (94% - 97%)  Wt(kg): --CAPILLARY BLOOD GLUCOSE      I&O's Summary    19 Dec 2021 07:01  -  20 Dec 2021 07:00  --------------------------------------------------------  IN: 920 mL / OUT: 1350 mL / NET: -430 mL        Physical Exam  Neuro: A+O x 3, non-focal, speech clear and intact  Pulm: CTA, equal bilaterally  CV: RRR,  +S1S2  Abd: soft, NT, ND, +BS  Ext: +DP Pulses b/l, +PT pulses, no edema  Inc: mid lower chest /abdomen C/D/I/stable w/ dressing + staples       Imaging:  CXR:  < from: Xray Chest 1 View- PORTABLE-Routine (Xray Chest 1 View- PORTABLE-Routine in AM.) (21 @ 04:51) >  IMPRESSION:  No active pulmonary disease.    Thank you for the courtesy of this referral.    --- End of Report ---    < end of copied text >

## 2021-12-20 NOTE — PROGRESS NOTE ADULT - ASSESSMENT
74 y/o male with PMH of kidney ca and colon ca s/p resection, HTN, presented with intermittent worsening pleuritic chest pain for past week which then began most recently radiating to the back associated with shortness of breath. pt also illicted to having hematuria x 6 days.  Patient admitted 12/11 with pericardial effusion: 12/12: 400 cc effusion drained by interventional cardiology.  12/14 TTE/CT revealed reaccumulation of fluid and on 12/17 patient had pericardial window with FARHAT drain placement with Dr. Tena. FARHAT drain since removed 12/19. Repeat TTE  ordered for today 12/20 to assess for reaccumulation

## 2021-12-20 NOTE — DISCHARGE NOTE PROVIDER - NSDCMRMEDTOKEN_GEN_ALL_CORE_FT
albuterol 90 mcg/inh inhalation aerosol: 2 puff(s) inhaled every 6 hours   carvedilol 6.25 mg oral tablet: 1 tab(s) orally 2 times a day  chlorthalidone 25 mg oral tablet: 1 tab(s) orally once a day  gabapentin 600 mg oral tablet: 1 tab(s) orally 2 times a day  lisinopril 40 mg oral tablet: 1 tab(s) orally once a day   albuterol 90 mcg/inh inhalation aerosol: 2 puff(s) inhaled every 6 hours   amLODIPine 5 mg oral tablet: 1 tab(s) orally once a day  carvedilol 6.25 mg oral tablet: 1 tab(s) orally 2 times a day  gabapentin 600 mg oral tablet: 1 tab(s) orally 2 times a day  hydrALAZINE 50 mg oral tablet: 1 tab(s) orally 3 times a day  tamsulosin 0.4 mg oral capsule: 1 cap(s) orally once a day (at bedtime)

## 2021-12-20 NOTE — DISCHARGE NOTE NURSING/CASE MANAGEMENT/SOCIAL WORK - NSDCPEFALRISK_GEN_ALL_CORE
For information on Fall & Injury Prevention, visit: [email or web address] OR  [email or web address] OR  https://www.cdc.gov/steadi/patient.html

## 2021-12-20 NOTE — PROGRESS NOTE ADULT - PROBLEM SELECTOR PLAN 1
S/P drainage pericardial effusion and creation of pericardial window  FARHAT drain d/c'd   LABS: H/H, K+, Mg++ and kidney function all normal  TTE ordered this AM 12/20  EP to re-evlauate patient   If all stable plan to discharge patient home 12/20    PLAN of care discussed with Dr. Tena in AM rounds

## 2021-12-20 NOTE — DISCHARGE NOTE PROVIDER - ATTENDING DISCHARGE PHYSICAL EXAMINATION:
T(C): 37.1 (12-20-21 @ 06:02), Max: 37.1 (12-20-21 @ 06:02)  HR: 64 (12-20-21 @ 06:02) (56 - 64)  BP: 158/60 (12-20-21 @ 07:55) (153/59 - 182/64)  RR: 17 (12-20-21 @ 06:02) (17 - 18)  SpO2: 97% (12-20-21 @ 06:02) (94% - 97%)RA    GEN - NAD  HEENT - NCAT, EOMI, HEATHER, no JVD/bruit.  RESP - CTA BL, no wheeze/rhonchi/crackles. not on supplemental O2.  CARDIO - NS1S2, RRR. No murmurs  ABD - Soft/Non tender/Non distended. Normal BS x4 quadrants.   Ext - No DEBORA. no signs of venous/arterial stasis ulcers  MSK - full ROM of BL upper and lower extremities without pain or restriction. BL 5/5 strength on upper and lower extremities.   Neuro - cn 2-12 grossly intact.  no visible seizure activity appreciated. no tremor. gait not observed.    Psych- AAOx3. appropriate behaviour. attentive. normal affect.    Patient denies any chest pain,sob,fever,chill. Ambulating w/o any device,so2 stable. countinue coreg for afib w/episode of pauses/arrythmia. No AC recommends for risk of bleed for pericardial effusion. Fluid c/s stable to date. BP stable now . Continue coreg,amlodipine,hydralzine. Hold nephrotoxic meds. Renal function improve.f/u with his own oncology.History of Colon CA s/p resection and Renal CA.

## 2021-12-20 NOTE — PROGRESS NOTE ADULT - PROVIDER SPECIALTY LIST ADULT
Hospitalist
Hospitalist
Thoracic Surgery
Thoracic Surgery
Electrophysiology
Hospitalist
Hospitalist
Internal Medicine
Thoracic Surgery
CT Surgery
Cardiology
Cardiology
Electrophysiology
Hospitalist
Internal Medicine
Internal Medicine
CT Surgery
Cardiology

## 2021-12-20 NOTE — DISCHARGE NOTE NURSING/CASE MANAGEMENT/SOCIAL WORK - NSDCFUADDAPPT_GEN_ALL_CORE_FT
pcp one week  f/u own oncologist as schedule    Pleases follow up with Dr. Tena on December, 29, 2021 at 2pm.

## 2021-12-20 NOTE — DISCHARGE NOTE NURSING/CASE MANAGEMENT/SOCIAL WORK - PATIENT PORTAL LINK FT
You can access the FollowMyHealth Patient Portal offered by [facility] by registering at the following website: [email or web address] By joining [facility]’s FollowMyHealth portal, you will also be able to view your health information using other applications (apps) compatible with our system.

## 2021-12-20 NOTE — DISCHARGE NOTE PROVIDER - NSDCFUADDINST_GEN_ALL_CORE_FT
Please call the Cardiothoracic Surgery office at [phone] if you are experiencing any shortness of breath, chest pain, fevers or chills, drainage from the incisions, persistent nausea, vomiting or if you have any questions about your medications. If the symptoms are severe, call 911 and go to the nearest hospital.

## 2021-12-20 NOTE — DISCHARGE NOTE PROVIDER - CARE PROVIDER_API CALL
Robel Tena (MD)  Surgery; Thoracic Surgery  [address] [place] [address]  Phone: [phone]  Fax: [phone]  Follow Up Time:    Robel Tena)  Surgery; Thoracic Surgery  [address] [address]  Phone: [phone]  Fax: [phone]  Follow Up Time:     Skinny Roca)  Cardiac Electrophysiology; Cardiology; Internal Medicine  [address] [place] [id number]  Phone: [phone]  Fax: [phone]  Follow Up Time: 1 week   Robel Tena (MD)  Surgery; Thoracic Surgery  [address] [place] [address]  Phone: [phone]  Fax: [phone]  Scheduled Appointment: 12/29/2021 02:00 PM    Skinny Roca)  Cardiac Electrophysiology; Cardiology; Internal Medicine  [address] [place] [id number]  Phone: [phone]  Fax: [phone]  Follow Up Time: 1 week

## 2021-12-20 NOTE — DISCHARGE NOTE PROVIDER - PROVIDER TOKENS
PROVIDER:[TOKEN:[[id number]]] PROVIDER:[TOKEN:[[id number]]],PROVIDER:[TOKEN:[[id number]],FOLLOWUP:[1 week]] PROVIDER:[TOKEN:[[id number]],SCHEDULEDAPPT:[12/29/2021],SCHEDULEDAPPTTIME:[02:00 PM]],PROVIDER:[TOKEN:[[id number]],FOLLOWUP:[1 week]]

## 2021-12-20 NOTE — DISCHARGE NOTE PROVIDER - NSDCCPCAREPLAN_GEN_ALL_CORE_FT
PRINCIPAL DISCHARGE DIAGNOSIS  Diagnosis: Pericardial effusion  Assessment and Plan of Treatment: now resolved. - Please call the Cardiothoracic Surgery office at [phone] if you are experiencing any shortness of breath, chest pain, fevers or chills, drainage from the incisions, persistent nausea, vomiting or if you have any questions about your medications. If the symptoms are severe, call 911 and go to the nearest hospital.        SECONDARY DISCHARGE DIAGNOSES  Diagnosis: HTN (hypertension)  Assessment and Plan of Treatment: Take all medications as prescribed. Follow up with your primary care doctor within two weeks.       PRINCIPAL DISCHARGE DIAGNOSIS  Diagnosis: Pericardial effusion  Assessment and Plan of Treatment: now resolved. - Please call the Cardiothoracic Surgery office at [phone] if you are experiencing any shortness of breath, chest pain, fevers or chills, drainage from the incisions, persistent nausea, vomiting or if you have any questions about your medications. If the symptoms are severe, call 911 and go to the nearest hospital.        SECONDARY DISCHARGE DIAGNOSES  Diagnosis: Pericardial effusion  Assessment and Plan of Treatment:     Diagnosis: Hypertensive urgency  Assessment and Plan of Treatment:     Diagnosis: Acute renal failure  Assessment and Plan of Treatment:     Diagnosis: NSVT (nonsustained ventricular tachycardia)  Assessment and Plan of Treatment:     Diagnosis: History of BPH  Assessment and Plan of Treatment:     Diagnosis: Atrial fibrillation  Assessment and Plan of Treatment:      PRINCIPAL DISCHARGE DIAGNOSIS  Diagnosis: Pericardial effusion  Assessment and Plan of Treatment: Please follow up with Dr. Tena in the office on Dec. 29, at 2pm    Please call the Cardiothoracic Surgery office at [phone] if you are experiencing any shortness of breath, chest pain, fevers or chills, drainage from the incisions, persistent nausea, vomiting or if you have any questions about your medications. If the symptoms are severe, call 911 and go to the nearest hospital.        SECONDARY DISCHARGE DIAGNOSES  Diagnosis: Pericardial effusion  Assessment and Plan of Treatment:     Diagnosis: Hypertensive urgency  Assessment and Plan of Treatment:     Diagnosis: Acute renal failure  Assessment and Plan of Treatment:     Diagnosis: History of BPH  Assessment and Plan of Treatment:     Diagnosis: NSVT (nonsustained ventricular tachycardia)  Assessment and Plan of Treatment:     Diagnosis: Atrial fibrillation  Assessment and Plan of Treatment:      PRINCIPAL DISCHARGE DIAGNOSIS  Diagnosis: Pericardial effusion  Assessment and Plan of Treatment: Please follow up with Dr. Tena in the office on Dec. 29, at 2pm. Your staples will be removed in the office during your visit. Please call the office if you see excessive drainage  coming out of the incision site or severe redness   Please call the Cardiothoracic Surgery office at [phone] if you are experiencing any shortness of breath, chest pain, fevers or chills, drainage from the incisions, persistent nausea, vomiting or if you have any questions about your medications. If the symptoms are severe, call 911 and go to the nearest hospital.        SECONDARY DISCHARGE DIAGNOSES  Diagnosis: Pericardial effusion  Assessment and Plan of Treatment:     Diagnosis: Hypertensive urgency  Assessment and Plan of Treatment:     Diagnosis: Acute renal failure  Assessment and Plan of Treatment:     Diagnosis: History of BPH  Assessment and Plan of Treatment:     Diagnosis: NSVT (nonsustained ventricular tachycardia)  Assessment and Plan of Treatment:     Diagnosis: Atrial fibrillation  Assessment and Plan of Treatment:

## 2021-12-20 NOTE — DISCHARGE NOTE PROVIDER - CARE PROVIDERS DIRECT ADDRESSES
,[email or web address]

## 2021-12-20 NOTE — PHYSICAL THERAPY INITIAL EVALUATION ADULT - PERTINENT HX OF CURRENT PROBLEM, REHAB EVAL
chest pain, s/p pericardial effusion s/p drainage of pericardial effusion and creation of pericardial window 12/17

## 2021-12-20 NOTE — CHART NOTE - NSCHARTNOTEFT_GEN_A_CORE
Called by bedside nurse patient had a 4.17 sec pause. Asymptomatic  Plan  Place pacer pads  Atropine at bedside  Continue monitoring  BMP and Mag ordered stat. Day NP will f/u
Pt remains on Coreg 6.25mg. Tele reviewed - no further AF or pauses noted.   A/C on hold for now due to risk of recurrent pericardial effusion.   No barriers to d/c from EP perspective.   Will sign off as to EP. Please call EP service with questions, concerns or further arrhythmia issues.   Further dispo per primary team.
Called by RN after pt had episodes of HR tachy upto 150s, unsustained, drops down to 60s, 50s.  Pt not a candidate for AC due to bloody pericardial effusion.  K/Mg/Phos wnl 12/14.  Asymptomatic.  In no distress.  Pt just received am meds.  RN to continue to monitor and escalate for sustained arrhythmia.
pt sleeping, awaiting pericardial window. Tele showed no more pauses or bradycardia. Brief AF with RVR.     MEDICATIONS  (STANDING):  amLODIPine   Tablet 5 milliGRAM(s) Oral daily  carvedilol 6.25 milliGRAM(s) Oral every 12 hours  ceFAZolin   IVPB 2000 milliGRAM(s) IV Intermittent once  gabapentin 600 milliGRAM(s) Oral two times a day  hydrALAZINE 50 milliGRAM(s) Oral three times a day  influenza  Vaccine (HIGH DOSE) 0.7 milliLiter(s) IntraMuscular once  pantoprazole    Tablet 40 milliGRAM(s) Oral before breakfast  tamsulosin 0.4 milliGRAM(s) Oral at bedtime    MEDICATIONS  (PRN):  acetaminophen     Tablet .. 650 milliGRAM(s) Oral every 6 hours PRN Temp greater or equal to 38C (100.4F), Mild Pain (1 - 3)  ALBUTerol    90 MICROgram(s) HFA Inhaler 2 Puff(s) Inhalation every 6 hours PRN Shortness of Breath and/or Wheezing      plan:  -c/w coreg at current dose. will r/a after window  d/w Dr. Willingham

## 2021-12-20 NOTE — DISCHARGE NOTE PROVIDER - NSDCFUADDAPPT_GEN_ALL_CORE_FT
pcp one week  f/u own oncologist as schedule pcp one week  f/u own oncologist as schedule    Pleases follow up with Dr. Tena on December, 29, 2021 at 2pm.

## 2021-12-22 LAB
NON-GYNECOLOGICAL CYTOLOGY STUDY: SIGNIFICANT CHANGE UP
VIRUS SPEC CULT: SIGNIFICANT CHANGE UP

## 2021-12-23 LAB
CULTURE RESULTS: SIGNIFICANT CHANGE UP
SPECIMEN SOURCE: SIGNIFICANT CHANGE UP

## 2021-12-29 ENCOUNTER — APPOINTMENT (OUTPATIENT)
Dept: THORACIC SURGERY | Facility: CLINIC | Age: 76
End: 2021-12-29
Payer: SELF-PAY

## 2021-12-29 DIAGNOSIS — I31.3 PERICARDIAL EFFUSION (NONINFLAMMATORY): ICD-10-CM

## 2021-12-29 DIAGNOSIS — Z78.9 OTHER SPECIFIED HEALTH STATUS: ICD-10-CM

## 2021-12-29 PROCEDURE — 99024 POSTOP FOLLOW-UP VISIT: CPT

## 2021-12-29 NOTE — PHYSICAL EXAM
[] : no respiratory distress [Auscultation Breath Sounds / Voice Sounds] : lungs were clear to auscultation bilaterally [Heart Rate And Rhythm] : heart rate was normal and rhythm regular [Heart Sounds Gallop] : no gallops [Heart Sounds] : normal S1 and S2 [Murmurs] : no murmurs [Heart Sounds Pericardial Friction Rub] : no pericardial rub [FreeTextEntry1] : wound is healing well

## 2021-12-29 NOTE — HISTORY OF PRESENT ILLNESS
[FreeTextEntry1] : Mr. ONEIDA OCHOA is a 75 year male who presents today for followup. Past medical history includes kidney and colon cancer, hypertension.\par \par Patient presented to the [facility] emergency department with complaints of pleuritic chest pain for a week with associated shortness of breath. He was discovered to have a pericardial effusion and was admitted to [facility] for care. He underwent a pericardiocentesis on 12/12/21 with drainage of 400 cc fluid but it reaccumulated and required operative management with a pericardial window on 12/17/21 with Dr Tena. A FARHAT drain was placed and subsequent echocardiogram showed only small pericardial effuiosn. The drain was removed 12/19/21 and he was discharged on 12/20/21. He is now here for followup\par \par Today, the patient reports

## 2021-12-29 NOTE — REVIEW OF SYSTEMS
[Negative] : Heme/Lymph [Feeling Poorly] : not feeling poorly [Feeling Tired] : not feeling tired [Chest Pain] : no chest pain [Palpitations] : no palpitations [Shortness Of Breath] : no shortness of breath [Cough] : no cough [SOB on Exertion] : no shortness of breath during exertion

## 2021-12-29 NOTE — ASSESSMENT
[FreeTextEntry1] : Lorenzo is a 75 year old male s/p subxiphoid pericardial window who is recovering well.  The etiology still remains unclear but cytology is negative.  Of note he did have dense adhesions within the pericardial space.  He can see me on a prn basis going forward if additional problems arise.\par \par Thank you for allowing me to participate in the care of your patient.\par \par Robel Tena MD\par Department of Cardiovascular and Thoracic Surgery\par \par [employer] [facility]\par

## 2021-12-29 NOTE — DATA REVIEWED
[FreeTextEntry1] : Specimen(s) Submitted\par PERICARDIAL FLUID\par \par Final Diagnosis\par PERICARDIAL FLUID\par NEGATIVE FOR MALIGNANT CELLS.\par

## 2021-12-30 PROCEDURE — 93456 R HRT CORONARY ARTERY ANGIO: CPT

## 2021-12-30 PROCEDURE — 74177 CT ABD & PELVIS W/CONTRAST: CPT | Mod: ME

## 2021-12-30 PROCEDURE — 81001 URINALYSIS AUTO W/SCOPE: CPT

## 2021-12-30 PROCEDURE — 87077 CULTURE AEROBIC IDENTIFY: CPT

## 2021-12-30 PROCEDURE — 93005 ELECTROCARDIOGRAM TRACING: CPT

## 2021-12-30 PROCEDURE — C1894: CPT

## 2021-12-30 PROCEDURE — 85027 COMPLETE CBC AUTOMATED: CPT

## 2021-12-30 PROCEDURE — 93308 TTE F-UP OR LMTD: CPT

## 2021-12-30 PROCEDURE — 99153 MOD SED SAME PHYS/QHP EA: CPT

## 2021-12-30 PROCEDURE — 85610 PROTHROMBIN TIME: CPT

## 2021-12-30 PROCEDURE — 83615 LACTATE (LD) (LDH) ENZYME: CPT

## 2021-12-30 PROCEDURE — 88302 TISSUE EXAM BY PATHOLOGIST: CPT

## 2021-12-30 PROCEDURE — C9399: CPT

## 2021-12-30 PROCEDURE — 99285 EMERGENCY DEPT VISIT HI MDM: CPT

## 2021-12-30 PROCEDURE — 82962 GLUCOSE BLOOD TEST: CPT

## 2021-12-30 PROCEDURE — 87116 MYCOBACTERIA CULTURE: CPT

## 2021-12-30 PROCEDURE — 88311 DECALCIFY TISSUE: CPT

## 2021-12-30 PROCEDURE — 87206 SMEAR FLUORESCENT/ACID STAI: CPT

## 2021-12-30 PROCEDURE — 96374 THER/PROPH/DIAG INJ IV PUSH: CPT

## 2021-12-30 PROCEDURE — 84443 ASSAY THYROID STIM HORMONE: CPT

## 2021-12-30 PROCEDURE — G1004: CPT

## 2021-12-30 PROCEDURE — 83735 ASSAY OF MAGNESIUM: CPT

## 2021-12-30 PROCEDURE — C1887: CPT

## 2021-12-30 PROCEDURE — 86900 BLOOD TYPING SEROLOGIC ABO: CPT

## 2021-12-30 PROCEDURE — 87070 CULTURE OTHR SPECIMN AEROBIC: CPT

## 2021-12-30 PROCEDURE — 80048 BASIC METABOLIC PNL TOTAL CA: CPT

## 2021-12-30 PROCEDURE — 82042 OTHER SOURCE ALBUMIN QUAN EA: CPT

## 2021-12-30 PROCEDURE — 71275 CT ANGIOGRAPHY CHEST: CPT | Mod: ME

## 2021-12-30 PROCEDURE — 88112 CYTOPATH CELL ENHANCE TECH: CPT

## 2021-12-30 PROCEDURE — 76770 US EXAM ABDO BACK WALL COMP: CPT

## 2021-12-30 PROCEDURE — 84100 ASSAY OF PHOSPHORUS: CPT

## 2021-12-30 PROCEDURE — 93306 TTE W/DOPPLER COMPLETE: CPT

## 2021-12-30 PROCEDURE — 84484 ASSAY OF TROPONIN QUANT: CPT

## 2021-12-30 PROCEDURE — 82945 GLUCOSE OTHER FLUID: CPT

## 2021-12-30 PROCEDURE — 87205 SMEAR GRAM STAIN: CPT

## 2021-12-30 PROCEDURE — U0005: CPT

## 2021-12-30 PROCEDURE — 85730 THROMBOPLASTIN TIME PARTIAL: CPT

## 2021-12-30 PROCEDURE — 87086 URINE CULTURE/COLONY COUNT: CPT

## 2021-12-30 PROCEDURE — U0003: CPT

## 2021-12-30 PROCEDURE — 83880 ASSAY OF NATRIURETIC PEPTIDE: CPT

## 2021-12-30 PROCEDURE — 99152 MOD SED SAME PHYS/QHP 5/>YRS: CPT

## 2021-12-30 PROCEDURE — 88305 TISSUE EXAM BY PATHOLOGIST: CPT

## 2021-12-30 PROCEDURE — 86140 C-REACTIVE PROTEIN: CPT

## 2021-12-30 PROCEDURE — 83605 ASSAY OF LACTIC ACID: CPT

## 2021-12-30 PROCEDURE — 87102 FUNGUS ISOLATION CULTURE: CPT

## 2021-12-30 PROCEDURE — 87252 VIRUS INOCULATION TISSUE: CPT

## 2021-12-30 PROCEDURE — 97163 PT EVAL HIGH COMPLEX 45 MIN: CPT

## 2021-12-30 PROCEDURE — C1889: CPT

## 2021-12-30 PROCEDURE — 80053 COMPREHEN METABOLIC PANEL: CPT

## 2021-12-30 PROCEDURE — 86850 RBC ANTIBODY SCREEN: CPT

## 2021-12-30 PROCEDURE — 84157 ASSAY OF PROTEIN OTHER: CPT

## 2021-12-30 PROCEDURE — 85379 FIBRIN DEGRADATION QUANT: CPT

## 2021-12-30 PROCEDURE — 36415 COLL VENOUS BLD VENIPUNCTURE: CPT

## 2021-12-30 PROCEDURE — 85652 RBC SED RATE AUTOMATED: CPT

## 2021-12-30 PROCEDURE — 87015 SPECIMEN INFECT AGNT CONCNTJ: CPT

## 2021-12-30 PROCEDURE — 86901 BLOOD TYPING SEROLOGIC RH(D): CPT

## 2021-12-30 PROCEDURE — 87075 CULTR BACTERIA EXCEPT BLOOD: CPT

## 2021-12-30 PROCEDURE — 33016 PERICARDIOCENTESIS W/IMAGING: CPT

## 2021-12-30 PROCEDURE — 71045 X-RAY EXAM CHEST 1 VIEW: CPT

## 2021-12-30 PROCEDURE — C1769: CPT

## 2021-12-30 PROCEDURE — 85025 COMPLETE CBC W/AUTO DIFF WBC: CPT

## 2021-12-30 PROCEDURE — 0225U NFCT DS DNA&RNA 21 SARSCOV2: CPT

## 2021-12-30 PROCEDURE — 83690 ASSAY OF LIPASE: CPT

## 2021-12-30 PROCEDURE — 89051 BODY FLUID CELL COUNT: CPT

## 2022-01-03 LAB — SURGICAL PATHOLOGY STUDY: SIGNIFICANT CHANGE UP

## 2022-01-15 LAB
CULTURE RESULTS: SIGNIFICANT CHANGE UP
SPECIMEN SOURCE: SIGNIFICANT CHANGE UP

## 2022-02-02 LAB
CULTURE RESULTS: SIGNIFICANT CHANGE UP
SPECIMEN SOURCE: SIGNIFICANT CHANGE UP

## 2022-02-05 LAB
CULTURE RESULTS: SIGNIFICANT CHANGE UP
SPECIMEN SOURCE: SIGNIFICANT CHANGE UP

## 2022-10-24 ENCOUNTER — INPATIENT (INPATIENT)
Facility: HOSPITAL | Age: 77
LOS: 3 days | Discharge: HOME | DRG: 280 | End: 2022-10-28
Attending: HOSPITALIST | Admitting: STUDENT IN AN ORGANIZED HEALTH CARE EDUCATION/TRAINING PROGRAM
Payer: MEDICARE

## 2022-10-24 VITALS
SYSTOLIC BLOOD PRESSURE: 203 MMHG | HEIGHT: 71 IN | DIASTOLIC BLOOD PRESSURE: 91 MMHG | OXYGEN SATURATION: 99 % | RESPIRATION RATE: 16 BRPM | HEART RATE: 94 BPM | TEMPERATURE: 98 F | WEIGHT: 194.01 LBS

## 2022-10-24 LAB
ALBUMIN SERPL ELPH-MCNC: 4.1 G/DL — SIGNIFICANT CHANGE UP (ref 3.3–5.2)
ALP SERPL-CCNC: 113 U/L — SIGNIFICANT CHANGE UP (ref 40–120)
ALT FLD-CCNC: 14 U/L — SIGNIFICANT CHANGE UP
ANION GAP SERPL CALC-SCNC: 13 MMOL/L — SIGNIFICANT CHANGE UP (ref 5–17)
APTT BLD: 31 SEC — SIGNIFICANT CHANGE UP (ref 27.5–35.5)
AST SERPL-CCNC: 23 U/L — SIGNIFICANT CHANGE UP
BASOPHILS # BLD AUTO: 0.05 K/UL — SIGNIFICANT CHANGE UP (ref 0–0.2)
BASOPHILS NFR BLD AUTO: 0.6 % — SIGNIFICANT CHANGE UP (ref 0–2)
BILIRUB SERPL-MCNC: 0.5 MG/DL — SIGNIFICANT CHANGE UP (ref 0.4–2)
BLD GP AB SCN SERPL QL: SIGNIFICANT CHANGE UP
BUN SERPL-MCNC: 20.4 MG/DL — HIGH (ref 8–20)
CALCIUM SERPL-MCNC: 8.9 MG/DL — SIGNIFICANT CHANGE UP (ref 8.4–10.5)
CHLORIDE SERPL-SCNC: 103 MMOL/L — SIGNIFICANT CHANGE UP (ref 98–107)
CO2 SERPL-SCNC: 25 MMOL/L — SIGNIFICANT CHANGE UP (ref 22–29)
CREAT SERPL-MCNC: 1.07 MG/DL — SIGNIFICANT CHANGE UP (ref 0.5–1.3)
EGFR: 71 ML/MIN/1.73M2 — SIGNIFICANT CHANGE UP
EOSINOPHIL # BLD AUTO: 0.81 K/UL — HIGH (ref 0–0.5)
EOSINOPHIL NFR BLD AUTO: 9.3 % — HIGH (ref 0–6)
GLUCOSE SERPL-MCNC: 95 MG/DL — SIGNIFICANT CHANGE UP (ref 70–99)
HCT VFR BLD CALC: 46.8 % — SIGNIFICANT CHANGE UP (ref 39–50)
HGB BLD-MCNC: 15.9 G/DL — SIGNIFICANT CHANGE UP (ref 13–17)
IMM GRANULOCYTES NFR BLD AUTO: 0.5 % — SIGNIFICANT CHANGE UP (ref 0–0.9)
INR BLD: 1.17 RATIO — HIGH (ref 0.88–1.16)
LYMPHOCYTES # BLD AUTO: 1.42 K/UL — SIGNIFICANT CHANGE UP (ref 1–3.3)
LYMPHOCYTES # BLD AUTO: 16.3 % — SIGNIFICANT CHANGE UP (ref 13–44)
MCHC RBC-ENTMCNC: 30.6 PG — SIGNIFICANT CHANGE UP (ref 27–34)
MCHC RBC-ENTMCNC: 34 GM/DL — SIGNIFICANT CHANGE UP (ref 32–36)
MCV RBC AUTO: 90.2 FL — SIGNIFICANT CHANGE UP (ref 80–100)
MONOCYTES # BLD AUTO: 0.61 K/UL — SIGNIFICANT CHANGE UP (ref 0–0.9)
MONOCYTES NFR BLD AUTO: 7 % — SIGNIFICANT CHANGE UP (ref 2–14)
NEUTROPHILS # BLD AUTO: 5.79 K/UL — SIGNIFICANT CHANGE UP (ref 1.8–7.4)
NEUTROPHILS NFR BLD AUTO: 66.3 % — SIGNIFICANT CHANGE UP (ref 43–77)
NT-PROBNP SERPL-SCNC: 7540 PG/ML — HIGH (ref 0–300)
PLATELET # BLD AUTO: 290 K/UL — SIGNIFICANT CHANGE UP (ref 150–400)
POTASSIUM SERPL-MCNC: 4.4 MMOL/L — SIGNIFICANT CHANGE UP (ref 3.5–5.3)
POTASSIUM SERPL-SCNC: 4.4 MMOL/L — SIGNIFICANT CHANGE UP (ref 3.5–5.3)
PROT SERPL-MCNC: 7.1 G/DL — SIGNIFICANT CHANGE UP (ref 6.6–8.7)
PROTHROM AB SERPL-ACNC: 13.6 SEC — HIGH (ref 10.5–13.4)
RBC # BLD: 5.19 M/UL — SIGNIFICANT CHANGE UP (ref 4.2–5.8)
RBC # FLD: 13.9 % — SIGNIFICANT CHANGE UP (ref 10.3–14.5)
SODIUM SERPL-SCNC: 141 MMOL/L — SIGNIFICANT CHANGE UP (ref 135–145)
TROPONIN T SERPL-MCNC: 0.14 NG/ML — HIGH (ref 0–0.06)
WBC # BLD: 8.72 K/UL — SIGNIFICANT CHANGE UP (ref 3.8–10.5)
WBC # FLD AUTO: 8.72 K/UL — SIGNIFICANT CHANGE UP (ref 3.8–10.5)

## 2022-10-24 PROCEDURE — 71045 X-RAY EXAM CHEST 1 VIEW: CPT | Mod: 26

## 2022-10-24 PROCEDURE — 99285 EMERGENCY DEPT VISIT HI MDM: CPT

## 2022-10-24 PROCEDURE — 93010 ELECTROCARDIOGRAM REPORT: CPT

## 2022-10-24 RX ORDER — HEPARIN SODIUM 5000 [USP'U]/ML
INJECTION INTRAVENOUS; SUBCUTANEOUS
Qty: 25000 | Refills: 0 | Status: DISCONTINUED | OUTPATIENT
Start: 2022-10-24 | End: 2022-10-26

## 2022-10-24 RX ORDER — HEPARIN SODIUM 5000 [USP'U]/ML
5000 INJECTION INTRAVENOUS; SUBCUTANEOUS ONCE
Refills: 0 | Status: COMPLETED | OUTPATIENT
Start: 2022-10-24 | End: 2022-10-24

## 2022-10-24 RX ORDER — HEPARIN SODIUM 5000 [USP'U]/ML
5600 INJECTION INTRAVENOUS; SUBCUTANEOUS EVERY 6 HOURS
Refills: 0 | Status: DISCONTINUED | OUTPATIENT
Start: 2022-10-24 | End: 2022-10-26

## 2022-10-24 RX ADMIN — HEPARIN SODIUM 1000 UNIT(S)/HR: 5000 INJECTION INTRAVENOUS; SUBCUTANEOUS at 23:23

## 2022-10-24 RX ADMIN — HEPARIN SODIUM 5000 UNIT(S): 5000 INJECTION INTRAVENOUS; SUBCUTANEOUS at 23:23

## 2022-10-24 NOTE — ED PROVIDER NOTE - OBJECTIVE STATEMENT
77 y/o male with PMHx of HTN, Pericardial effusion in august 2021 with window and drain, presents to the ED c/o SOB and BARNES for the past week. Pt had cough and congestion last week that gradually developed into BARNES. Pt usually is able to sleep laying flat but recently has had to sit up more because he feels he is unable to breath.    Interpretation services offered, pt declined had daughter interpret 75 y/o male with PMHx of HTN, Pericardial effusion in December 2021 s/p window and drain, presents to the ED c/o SOB and BARNES for the past week. Pt had cough and congestion last week that gradually developed into BARNES. Pt usually is able to sleep laying flat but recently has had to sit up more because he feels he is unable to breath. Pt has not seen cardiologist since last hospitalization. Pt denies chest pain.     Interpretation services offered, pt declined had daughter interpret

## 2022-10-24 NOTE — ED ADULT TRIAGE NOTE - CHIEF COMPLAINT QUOTE
Pt had the flu 1 month ago and since then has had chest congestion and SOB. Pt sates he can't walk more than a few steps without getting out of breath.

## 2022-10-24 NOTE — ED ADULT NURSE NOTE - OBJECTIVE STATEMENT
Assumed care of pt at 2100 Pt A&Ox4 c/o SOB for the past 2 weeks. Today pt started having even worse SOB with ambulation and wasn't able to catch breath at all. Pt denies Asthma history or CHF hx. Pt feels most comfortable while laying with incline. Resp even and unlabored at this time but breathlessness occurs after 10-20 feet, abd soft nontender, denies n/v/d. Pt resting comfortably on stretcher.

## 2022-10-24 NOTE — ED PROVIDER NOTE - PHYSICAL EXAMINATION
Gen: Well appearing in NAD  Head: NC/AT  Neck: trachea midline  Card: regular rate and rhythm, distant  Resp:  CTAB  Abd: soft, non-distended, non-tender  Ext: no deformities  Neuro:  A&O appears non focal  Skin:  Warm and dry as visualized  Psych:  Normal affect and mood Gen: Well appearing in NAD  Head: NC/AT  Neck: trachea midline  Card: regular rate and rhythm, distant, no large pericardial effusion on POCUS  Resp:  CTAB  Abd: soft, non-distended, non-tender  Ext: no deformities  Neuro:  A&O appears non focal  Skin:  Warm and dry as visualized  Psych:  Normal affect and mood

## 2022-10-24 NOTE — ED ADULT NURSE NOTE - CAS DISCH ACCOMP BY
Self Implemented All Universal Safety Interventions:  [place] to call system. Call bell, personal items and telephone within reach. Instruct patient to call for assistance. Room bathroom lighting operational. Non-slip footwear when patient is off stretcher. Physically safe environment: no spills, clutter or unnecessary equipment. Stretcher in lowest position, wheels locked, appropriate side rails in place.

## 2022-10-24 NOTE — ED PROVIDER NOTE - CARE PLAN
1 Principal Discharge DX:	NSTEMI (non-ST elevation myocardial infarction)  Secondary Diagnosis:	CHF exacerbation

## 2022-10-25 DIAGNOSIS — I10 ESSENTIAL (PRIMARY) HYPERTENSION: ICD-10-CM

## 2022-10-25 DIAGNOSIS — I21.4 NON-ST ELEVATION (NSTEMI) MYOCARDIAL INFARCTION: ICD-10-CM

## 2022-10-25 DIAGNOSIS — R06.02 SHORTNESS OF BREATH: ICD-10-CM

## 2022-10-25 DIAGNOSIS — Z98.890 OTHER SPECIFIED POSTPROCEDURAL STATES: Chronic | ICD-10-CM

## 2022-10-25 DIAGNOSIS — Z90.49 ACQUIRED ABSENCE OF OTHER SPECIFIED PARTS OF DIGESTIVE TRACT: Chronic | ICD-10-CM

## 2022-10-25 LAB
A1C WITH ESTIMATED AVERAGE GLUCOSE RESULT: 4.7 % — SIGNIFICANT CHANGE UP (ref 4–5.6)
ANION GAP SERPL CALC-SCNC: 8 MMOL/L — SIGNIFICANT CHANGE UP (ref 5–17)
APTT BLD: 49.7 SEC — HIGH (ref 27.5–35.5)
APTT BLD: 58.3 SEC — HIGH (ref 27.5–35.5)
APTT BLD: >200 SEC — CRITICAL HIGH (ref 27.5–35.5)
BUN SERPL-MCNC: 17.2 MG/DL — SIGNIFICANT CHANGE UP (ref 8–20)
CALCIUM SERPL-MCNC: 8.5 MG/DL — SIGNIFICANT CHANGE UP (ref 8.4–10.5)
CHLORIDE SERPL-SCNC: 105 MMOL/L — SIGNIFICANT CHANGE UP (ref 98–107)
CHOLEST SERPL-MCNC: 153 MG/DL — SIGNIFICANT CHANGE UP
CK SERPL-CCNC: 91 U/L — SIGNIFICANT CHANGE UP (ref 30–200)
CO2 SERPL-SCNC: 26 MMOL/L — SIGNIFICANT CHANGE UP (ref 22–29)
CREAT SERPL-MCNC: 1.22 MG/DL — SIGNIFICANT CHANGE UP (ref 0.5–1.3)
EGFR: 61 ML/MIN/1.73M2 — SIGNIFICANT CHANGE UP
ESTIMATED AVERAGE GLUCOSE: 88 MG/DL — SIGNIFICANT CHANGE UP (ref 68–114)
GLUCOSE SERPL-MCNC: 102 MG/DL — HIGH (ref 70–99)
HCT VFR BLD CALC: 45.2 % — SIGNIFICANT CHANGE UP (ref 39–50)
HCT VFR BLD CALC: 46.1 % — SIGNIFICANT CHANGE UP (ref 39–50)
HDLC SERPL-MCNC: 49 MG/DL — SIGNIFICANT CHANGE UP
HGB BLD-MCNC: 15.5 G/DL — SIGNIFICANT CHANGE UP (ref 13–17)
HGB BLD-MCNC: 15.6 G/DL — SIGNIFICANT CHANGE UP (ref 13–17)
INR BLD: 1.16 RATIO — SIGNIFICANT CHANGE UP (ref 0.88–1.16)
LIPID PNL WITH DIRECT LDL SERPL: 90 MG/DL — SIGNIFICANT CHANGE UP
MAGNESIUM SERPL-MCNC: 1.9 MG/DL — SIGNIFICANT CHANGE UP (ref 1.6–2.6)
MCHC RBC-ENTMCNC: 30.1 PG — SIGNIFICANT CHANGE UP (ref 27–34)
MCHC RBC-ENTMCNC: 30.3 PG — SIGNIFICANT CHANGE UP (ref 27–34)
MCHC RBC-ENTMCNC: 33.8 GM/DL — SIGNIFICANT CHANGE UP (ref 32–36)
MCHC RBC-ENTMCNC: 34.3 GM/DL — SIGNIFICANT CHANGE UP (ref 32–36)
MCV RBC AUTO: 88.5 FL — SIGNIFICANT CHANGE UP (ref 80–100)
MCV RBC AUTO: 88.8 FL — SIGNIFICANT CHANGE UP (ref 80–100)
NON HDL CHOLESTEROL: 104 MG/DL — SIGNIFICANT CHANGE UP
PHOSPHATE SERPL-MCNC: 3 MG/DL — SIGNIFICANT CHANGE UP (ref 2.4–4.7)
PLATELET # BLD AUTO: 267 K/UL — SIGNIFICANT CHANGE UP (ref 150–400)
PLATELET # BLD AUTO: 289 K/UL — SIGNIFICANT CHANGE UP (ref 150–400)
POTASSIUM SERPL-MCNC: 4.4 MMOL/L — SIGNIFICANT CHANGE UP (ref 3.5–5.3)
POTASSIUM SERPL-SCNC: 4.4 MMOL/L — SIGNIFICANT CHANGE UP (ref 3.5–5.3)
PROTHROM AB SERPL-ACNC: 13.5 SEC — HIGH (ref 10.5–13.4)
RBC # BLD: 5.11 M/UL — SIGNIFICANT CHANGE UP (ref 4.2–5.8)
RBC # BLD: 5.19 M/UL — SIGNIFICANT CHANGE UP (ref 4.2–5.8)
RBC # FLD: 13.8 % — SIGNIFICANT CHANGE UP (ref 10.3–14.5)
RBC # FLD: 13.8 % — SIGNIFICANT CHANGE UP (ref 10.3–14.5)
SARS-COV-2 RNA SPEC QL NAA+PROBE: SIGNIFICANT CHANGE UP
SODIUM SERPL-SCNC: 139 MMOL/L — SIGNIFICANT CHANGE UP (ref 135–145)
TRIGL SERPL-MCNC: 68 MG/DL — SIGNIFICANT CHANGE UP
TROPONIN T SERPL-MCNC: 0.17 NG/ML — HIGH (ref 0–0.06)
TSH SERPL-MCNC: 2.53 UIU/ML — SIGNIFICANT CHANGE UP (ref 0.27–4.2)
WBC # BLD: 8.61 K/UL — SIGNIFICANT CHANGE UP (ref 3.8–10.5)
WBC # BLD: 9.73 K/UL — SIGNIFICANT CHANGE UP (ref 3.8–10.5)
WBC # FLD AUTO: 8.61 K/UL — SIGNIFICANT CHANGE UP (ref 3.8–10.5)
WBC # FLD AUTO: 9.73 K/UL — SIGNIFICANT CHANGE UP (ref 3.8–10.5)

## 2022-10-25 PROCEDURE — 12345: CPT | Mod: NC

## 2022-10-25 PROCEDURE — 99223 1ST HOSP IP/OBS HIGH 75: CPT

## 2022-10-25 PROCEDURE — 99222 1ST HOSP IP/OBS MODERATE 55: CPT | Mod: 25

## 2022-10-25 PROCEDURE — 93306 TTE W/DOPPLER COMPLETE: CPT | Mod: 26

## 2022-10-25 PROCEDURE — 93010 ELECTROCARDIOGRAM REPORT: CPT

## 2022-10-25 RX ORDER — HYDRALAZINE HCL 50 MG
50 TABLET ORAL THREE TIMES A DAY
Refills: 0 | Status: DISCONTINUED | OUTPATIENT
Start: 2022-10-25 | End: 2022-10-26

## 2022-10-25 RX ORDER — FUROSEMIDE 40 MG
40 TABLET ORAL ONCE
Refills: 0 | Status: COMPLETED | OUTPATIENT
Start: 2022-10-25 | End: 2022-10-25

## 2022-10-25 RX ORDER — INFLUENZA VIRUS VACCINE 15; 15; 15; 15 UG/.5ML; UG/.5ML; UG/.5ML; UG/.5ML
0.7 SUSPENSION INTRAMUSCULAR ONCE
Refills: 0 | Status: COMPLETED | OUTPATIENT
Start: 2022-10-25 | End: 2022-10-25

## 2022-10-25 RX ORDER — CARVEDILOL PHOSPHATE 80 MG/1
6.25 CAPSULE, EXTENDED RELEASE ORAL EVERY 12 HOURS
Refills: 0 | Status: DISCONTINUED | OUTPATIENT
Start: 2022-10-25 | End: 2022-10-28

## 2022-10-25 RX ORDER — AMLODIPINE BESYLATE 2.5 MG/1
5 TABLET ORAL DAILY
Refills: 0 | Status: DISCONTINUED | OUTPATIENT
Start: 2022-10-25 | End: 2022-10-26

## 2022-10-25 RX ORDER — ACETAMINOPHEN 500 MG
650 TABLET ORAL EVERY 6 HOURS
Refills: 0 | Status: DISCONTINUED | OUTPATIENT
Start: 2022-10-25 | End: 2022-10-28

## 2022-10-25 RX ORDER — FUROSEMIDE 40 MG
40 TABLET ORAL
Refills: 0 | Status: DISCONTINUED | OUTPATIENT
Start: 2022-10-25 | End: 2022-10-28

## 2022-10-25 RX ORDER — LANOLIN ALCOHOL/MO/W.PET/CERES
3 CREAM (GRAM) TOPICAL AT BEDTIME
Refills: 0 | Status: DISCONTINUED | OUTPATIENT
Start: 2022-10-25 | End: 2022-10-28

## 2022-10-25 RX ORDER — TAMSULOSIN HYDROCHLORIDE 0.4 MG/1
0.4 CAPSULE ORAL AT BEDTIME
Refills: 0 | Status: DISCONTINUED | OUTPATIENT
Start: 2022-10-25 | End: 2022-10-28

## 2022-10-25 RX ORDER — ASPIRIN/CALCIUM CARB/MAGNESIUM 324 MG
81 TABLET ORAL DAILY
Refills: 0 | Status: DISCONTINUED | OUTPATIENT
Start: 2022-10-25 | End: 2022-10-28

## 2022-10-25 RX ORDER — LABETALOL HCL 100 MG
10 TABLET ORAL EVERY 4 HOURS
Refills: 0 | Status: DISCONTINUED | OUTPATIENT
Start: 2022-10-25 | End: 2022-10-28

## 2022-10-25 RX ORDER — ONDANSETRON 8 MG/1
4 TABLET, FILM COATED ORAL EVERY 8 HOURS
Refills: 0 | Status: DISCONTINUED | OUTPATIENT
Start: 2022-10-25 | End: 2022-10-28

## 2022-10-25 RX ORDER — GABAPENTIN 400 MG/1
600 CAPSULE ORAL
Refills: 0 | Status: DISCONTINUED | OUTPATIENT
Start: 2022-10-25 | End: 2022-10-28

## 2022-10-25 RX ADMIN — HEPARIN SODIUM 0 UNIT(S)/HR: 5000 INJECTION INTRAVENOUS; SUBCUTANEOUS at 22:40

## 2022-10-25 RX ADMIN — Medication 81 MILLIGRAM(S): at 11:53

## 2022-10-25 RX ADMIN — Medication 50 MILLIGRAM(S): at 05:20

## 2022-10-25 RX ADMIN — Medication 40 MILLIGRAM(S): at 16:41

## 2022-10-25 RX ADMIN — CARVEDILOL PHOSPHATE 6.25 MILLIGRAM(S): 80 CAPSULE, EXTENDED RELEASE ORAL at 05:20

## 2022-10-25 RX ADMIN — GABAPENTIN 600 MILLIGRAM(S): 400 CAPSULE ORAL at 18:18

## 2022-10-25 RX ADMIN — Medication 50 MILLIGRAM(S): at 21:52

## 2022-10-25 RX ADMIN — HEPARIN SODIUM 1200 UNIT(S)/HR: 5000 INJECTION INTRAVENOUS; SUBCUTANEOUS at 13:43

## 2022-10-25 RX ADMIN — AMLODIPINE BESYLATE 5 MILLIGRAM(S): 2.5 TABLET ORAL at 05:20

## 2022-10-25 RX ADMIN — Medication 50 MILLIGRAM(S): at 16:41

## 2022-10-25 RX ADMIN — TAMSULOSIN HYDROCHLORIDE 0.4 MILLIGRAM(S): 0.4 CAPSULE ORAL at 21:52

## 2022-10-25 RX ADMIN — HEPARIN SODIUM 1200 UNIT(S)/HR: 5000 INJECTION INTRAVENOUS; SUBCUTANEOUS at 20:01

## 2022-10-25 RX ADMIN — Medication 40 MILLIGRAM(S): at 05:20

## 2022-10-25 RX ADMIN — HEPARIN SODIUM 900 UNIT(S)/HR: 5000 INJECTION INTRAVENOUS; SUBCUTANEOUS at 23:43

## 2022-10-25 RX ADMIN — Medication 40 MILLIGRAM(S): at 00:47

## 2022-10-25 RX ADMIN — Medication 3 MILLIGRAM(S): at 21:53

## 2022-10-25 RX ADMIN — GABAPENTIN 600 MILLIGRAM(S): 400 CAPSULE ORAL at 05:21

## 2022-10-25 RX ADMIN — CARVEDILOL PHOSPHATE 6.25 MILLIGRAM(S): 80 CAPSULE, EXTENDED RELEASE ORAL at 18:18

## 2022-10-25 NOTE — H&P ADULT - HISTORY OF PRESENT ILLNESS
76 Urdu-speaking M with PMHX Pericardial Effusion s/p drain (12/21), HTN, Neuropathy, Colon CA/Kidney CA s/p resection, BPH presents to [facility] ER c/o SOB/BARNES for past month since having flu like illness at that time. +Orthopnea. +PND. Denies CP. pBNP and Troponin elevated on labs. BP uncontrolled 203/91 on arrival to ER. CXR volume overloaded. Has not followed up with Cardiologist since discharge from hospital last year s/p pericardial effusion.     ROS Negative unless mentioned.    PMHX: Pericardial Effusion s/p drain (12/21), HTN, Neuropathy, Colon CA s/p resection, BPH  PSHX: Pericardial Drain, Colon Resection, LHC  FamHx: No fam hx HTN  Social Hx: Former Tobacco/ETOH Abuse  Allergies: PCN

## 2022-10-25 NOTE — H&P ADULT - ASSESSMENT
ASSESSMENT:  76 [place]araguan-speaking M with PMHX Pericardial Effusion s/p drain (12/21), HTN, Neuropathy, Colon CA/Kidney CA s/p resection, BPH presents to [facility] ER c/o SOB/BARNES for past month admitted for Acute CHF Exacerbation, HTN Emergency, and NSTEMI.     PLAN:  Acute CHF Exacerbation, HTN Emergency, NSTEMI  -Suspect NSTEMI type 2 spill from uncontrolled HTN and CHF  -Satting well on 2L O2 via NC  -Admit to Tele  -Repeat EKG  -Check TTE especally with hx Pericardial Effusion. Bedside POCUS negative for large pericardial effusion per ED  -Lasix 40mg IV BID  -Labetalol 10mg IV PRN SBP >180  -ASA 81mg q24  -Heparin bolus/gtt for NSTEMI  -TSH/A1C/FLP  -Trend Cardiac Enzymes  -VTE PPX   -Cardiology Consulted (Eduardo)    HTN  -Amlodipine 5mg q24  -Hydralazine 50mg TID  -Coreg 6.25mg BID  -Labetalol and Lasix as above    BPH  -Flomax 0.4mg qHS    Neuropathy  -Gabapentin 600mg BID  -Check A1C

## 2022-10-25 NOTE — CONSULT NOTE ADULT - PROBLEM SELECTOR RECOMMENDATION 2
Uncontrolled  Pt didn't take Amlodipine 5 mg at home. Pt needs Amlodipine now  Continue home meds with strict parameters  Monitor BP  DASH diet    Further recommendations base on above findings Uncontrolled  Pt didn't take Amlodipine 5 mg at home. Pt needs Amlodipine now  Continue home meds with strict parameters  Monitor BP  DASH diet    Further recommendations base on above findings  Case discussed with Dr Burch, he will see pt and determine if pt will need LHC

## 2022-10-25 NOTE — ED ADULT NURSE REASSESSMENT NOTE - NS ED NURSE REASSESS COMMENT FT1
Pt noted to have pulled out his IV. to go to the bathroom, pt reports he had an episode of diarrhea, IV restarted, pt encourage to take IV pole which is on wheels to go to the bathroom. pt agrees. denies other c/o at this time.

## 2022-10-25 NOTE — ED ADULT NURSE REASSESSMENT NOTE - NS ED NURSE REASSESS COMMENT FT1
aPTT resulted: 58.3 which is WDL of therapeutic Heparin gtt. Maintained at 10cc/hr, next aPTT to be drawn 11:00AM. continuning to monitor.

## 2022-10-25 NOTE — ED ADULT NURSE REASSESSMENT NOTE - NS ED NURSE REASSESS COMMENT FT1
pt requesting something for the diarrhea, Dr. Tobias aware, no new orders at this time. if continues to get spec. pt updated.

## 2022-10-25 NOTE — CONSULT NOTE ADULT - NS ATTEND AMEND GEN_ALL_CORE FT
Patient is in ER B8L.  Patients main complaint is dyspnea.  Exam is unremarkable.  Patient had LHC in 12/2021 which showed minimal CAD  Etiology of elevated troponin is unclear  Will ge echo and then decide if any further testing is needed.  No Cath for Now    Discussed with Dr Jayson Tobias.    Will follow

## 2022-10-25 NOTE — H&P ADULT - NSICDXPASTMEDICALHX_GEN_ALL_CORE_FT
PAST MEDICAL HISTORY:  Cancer of kidney     Colon cancer s/p resection    HTN (hypertension)     Neuropathy     Pericardial effusion s/p drain

## 2022-10-25 NOTE — CHART NOTE - NSCHARTNOTEFT_GEN_A_CORE
pt seen and examined at bedside  admitted with sob - feels better, currently on nasal [place] O2  speaking full sentences   RS - CTAB, CVS - S1, S2 normal   Pedal edema - trace+    A/P   ACute CHF exacerbation, HTN - uncontrolled , NSTEMI ? demand ischemia   ct monitoirng, diuresis, I/o  ECHO   cardio recs  on heparin for NSTEMI
Hospitalist Note    Spoke with Dr Dominguez Cardio overnight.   Patient TTE with severely decreased LVEF 20-25%.   Also reduced RV Function and severe pulm HTN.   Official Report pending.  Patient already being treated/diuressed for CHF.   Cardiology ([place]) already following.   Defer further mgmt and need for LHC to Cardio.   VSS. Patient appears comfortable.   Continue diuresis and monitor closely.

## 2022-10-25 NOTE — ED ADULT NURSE REASSESSMENT NOTE - NS ED NURSE REASSESS COMMENT FT1
Report received from off going nurse Tanya RN, care of pt assumed at 0730, pt received sitting up on stretcher, brushing hair, and watching TV> IV heparin GTT at 10 units / hr infusing via alaris pump , next PTT due at 11am, pt denies chest pain, or sob at this time, reports he is feeling better than yesterday. pt updated on plan of care, questions asked and answered. v.s.s.

## 2022-10-25 NOTE — PATIENT PROFILE ADULT - FALL HARM RISK - HARM RISK INTERVENTIONS

## 2022-10-25 NOTE — H&P ADULT - NSHPPHYSICALEXAM_GEN_ALL_CORE
Vital Signs Last 24 Hrs  T(C): 36.8 (24 Oct 2022 23:53), Max: 36.9 (24 Oct 2022 19:44)  T(F): 98.3 (24 Oct 2022 23:53), Max: 98.5 (24 Oct 2022 19:44)  HR: 82 (24 Oct 2022 23:53) (82 - 94)  BP: 199/93 (24 Oct 2022 23:53) (199/93 - 203/91)  BP(mean): --  RR: 16 (24 Oct 2022 23:53) (16 - 16)  SpO2: 98% (24 Oct 2022 23:53) (98% - 99%)    Parameters below as of 24 Oct 2022 19:44  Patient On (Oxygen Delivery Method): room air    Constitutional: Well-appearing, NAD, VSS  Head: NC/AT  Eyes: PERRL, EOMI, anicteric sclera, conjunctiva WNL  ENT: Normal Pharynx, MMM, No tonsillar exudate/erythema  Neck: Supple, Non-tender  Chest: Non-tender, no rashes  Cardio: RRR, s1/s2  Resp: +Decreased BS BL +crackles  Abd: Soft, Non-tender, Non-distended, no rebound/guarding/rigidity  : not examined  Rectal: not examined  MSK: moving all extremities, no motor weakness, full ROM x4  Ext: palpable distal pulses, good capillary refill  Psych: appropriate, cooperative  Neuro: CN II-XII grossly intact, no focal deficits  Skin: Warm/Dry. No rashes. +Surgical Incisions to chest and abd

## 2022-10-25 NOTE — CONSULT NOTE ADULT - SUBJECTIVE AND OBJECTIVE BOX
[facility]                                              CARDIOLOGY AT [place] [address]                                             Telephone: [phone]. Fax:[phone]      CARDIOLOGY CONSULTATION NOTE                                                                                             History obtained by: Patient and medical record  Community Cardiologist: No   obtained: No   Reason for Consultation: SOB    Chief complaint:   Patient is a 76y old  Male who presents with a chief complaint of SOB x 1 month    HPI: 77 y/o male with PMHx of HTN, colon CA (sp chemo 3 years ago) and Pericardial effusion in December 2021 s/p window and drain, presents to the ED c/o SOB and BARNES for the past month. Pt states last month he had the flu and since then he had had cough with clear sputum, wheezing and congestion. Starting last week that gradually developed into BARNES and SOB at rest. Pt usually is able to sleep laying flat but recently has had to sit up more because he feels he is unable to breath. Pt has not seen cardiologist since last hospitalization. Pt denies chest pain, back pain, abdominal pain or palpitations.     CARDIAC TESTING     TTE Echo Limited or F/U (12.20.21 @ 11:21) >  Summary:   1. Left ventricular ejection fraction, by visual estimation, is 50 to 55%.   2. Normal global left ventricular systolic function.   3. Small pericardial effusion.   4. Small circumferential pericardial effusion with mild MV inflow   variation without wilian sign of chamber collapse (suboptimal imaging).   5. Moderate aortic regurgitation.  MD Lencho Electronically signed on 12/20/2021 at 12:50:55 PM  < end of copied text >    Cardiac Catheterization (12.13.21 @ 07:47) >  CORONARY VESSELS: The coronary circulation is right dominant.  LM:   --  LM: Normal.  LAD:   --  LAD: The vessel was very large sized. Angiography showed minor  luminal irregularities with no flow limiting lesions.  CX:   --  Circumflex: The vessel was medium to large sized. Angiography  showed minor luminal irregularities with no flow limiting lesions.  RCA:   --  RCA: The vessel was large sized (dominant). Angiography showed  minor luminal irregularities with no flow limiting lesions.  DIAGNOSTIC IMPRESSIONS: Minimal CAD  s/p pericardiocentesis of 450 ml bloody fluid  DIAGNOSTIC RECOMMENDATIONS: pericardial fluid analysis.  continue medical therapy for CAD with ASA and statins.  Prepared and signed by  Jeremías Morrell MD  Signed 12/22/2021 19:09:18  < end of copied text >    PAST MEDICAL HISTORY  HTN (hypertension)  Neuropathy    PAST SURGICAL HISTORY  [facility]    SOCIAL HISTORY: + former alcohol abuse, + Former smoker (quit 30 years ago used to smoke 1/2 ppd. Denies drugs    FAMILY HISTORY:  FHx: stomach cancer (Father)    Family History of Cardiovascular Disease:  No   Coronary Artery Disease in first degree relative: No   Sudden Cardiac Death in First degree relative: No     HOME MEDICATIONS:   carvedilol 6.25 mg oral tablet: 1 tab(s) orally 2 times a day (11 Dec 2021 21:32)  gabapentin 600 mg oral tablet: 1 tab(s) orally 2 times a day (11 Dec 2021 21:32)  Amlodipine 5 mg OD    CURRENT OTHER MEDICATIONS:   heparin   Injectable 5600 Unit(s) IV Push every 6 hours PRN For aPTT less than 40  heparin  Infusion.  Unit(s)/Hr (10 mL/Hr) IV Continuous <Continuous>    ALLERGIES:   penicillin (Rash)    REVIEW OF SYMPTOMS:   CONSTITUTIONAL: No fever, no chills, no weight loss, no weight gain, + fatigue   ENMT:  No vertigo; No sinus or throat pain  NECK: No pain or stiffness  CARDIOVASCULAR: No chest pain, + dyspnea, no syncope/presyncope, + fatigue, no palpitations, no dizziness, + Orthopnea, + Paroxsymal nocturnal dyspnea  RESPIRATORY: + shortness of breath, + cough, + wheezing  : No dysuria, no hematuria   GI: no nausea, no diarrhea, no constipation, no abdominal pain,   NEURO: No headache, no slurred speech   MUSCULOSKELETAL: No joint pain or swelling; No muscle, back, or extremity pain  PSYCH: No agitation, no anxiety.    ALL OTHER REVIEW OF SYSTEMS ARE NEGATIVE.    VITAL SIGNS:   T(C): 36.8 (10-24-22 @ 23:53), Max: 36.9 (10-24-22 @ 19:44)  T(F): 98.3 (10-24-22 @ 23:53), Max: 98.5 (10-24-22 @ 19:44)  HR: 82 (10-24-22 @ 23:53) (82 - 94)  BP: 199/93 (10-24-22 @ 23:53) (199/93 - 203/91)  RR: 16 (10-24-22 @ 23:53) (16 - 16)  SpO2: 98% (10-24-22 @ 23:53) (98% - 99%)     PHYSICAL EXAM:   Constitutional: Uncomfortable . No acute distress.   HEENT: Atraumatic and normocephalic , neck is supple . no JVD.   CNS: A&Ox3. No focal deficits.   Respiratory: + labored. + wheezing b/l, No crackles or rhonchi   Cardiovascular: RRR normal s1 s2. No murmur. No rubs or gallop.  Gastrointestinal: Soft, non-tender. +Bowel sounds.   Extremities: 2+ Peripheral Pulses, + 1 pitting edema b/l  Psychiatric: Calm . no agitation.   Skin: Warm and dry    LABS:   ( 24 Oct 2022 21:10 )  Troponin T  0.14<H>,  CPK  X    , CKMB  X    , BNP 7540<H>                        15.9   8.72  )-----------( 290      ( 24 Oct 2022 21:10 )             46.8     10-24    141  |  103  |  20.4<H>  ----------------------------<  95  4.4   |  25.0  |  1.07    Ca    8.9      24 Oct 2022 21:10    TPro  7.1  /  Alb  4.1  /  TBili  0.5  /  DBili  x   /  AST  23  /  ALT  14  /  AlkPhos  113  10-24    PT/INR - ( 24 Oct 2022 21:10 )   PT: 13.6 sec;   INR: 1.17 ratio      PTT - ( 24 Oct 2022 21:10 )  PTT:31.0 sec      ECG:   Prior ECG: Yes     RADIOLOGY & ADDITIONAL STUDIES:     Chest x-ray: cardiomegaly, no pleural effusions as per after hours xray line. Final report / reading pending       Preliminary evaluation, please await official recommendations     Assessment and recommendations are final when note is signed by the attending.                                      [facility]                                              CARDIOLOGY AT [place] [address]                                             Telephone: [phone]. Fax:[phone]      CARDIOLOGY CONSULTATION NOTE                                                                                             History obtained by: Patient and medical record  Community Cardiologist: No   obtained: No   Reason for Consultation: SOB    Chief complaint:   Patient is a 76y old  Male who presents with a chief complaint of SOB x 1 month    HPI: 77 y/o male with PMHx of HTN, colon CA (sp chemo 3 years ago) and Pericardial effusion in December 2021 s/p window and drain, presents to the ED c/o SOB and BARNES for the past month. Pt states last month he had the flu and since then he had had cough with clear sputum, wheezing and congestion. Starting last week that gradually developed into BARNES and SOB at rest. Pt usually is able to sleep laying flat but recently has had to sit up more because he feels he is unable to breath. Pt has not seen cardiologist since last hospitalization. Pt denies chest pain, back pain, abdominal pain or palpitations.     CARDIAC TESTING     TTE Echo Limited or F/U (12.20.21 @ 11:21) >  Summary:   1. Left ventricular ejection fraction, by visual estimation, is 50 to 55%.   2. Normal global left ventricular systolic function.   3. Small pericardial effusion.   4. Small circumferential pericardial effusion with mild MV inflow   variation without wilian sign of chamber collapse (suboptimal imaging).   5. Moderate aortic regurgitation.  MD Lencho Electronically signed on 12/20/2021 at 12:50:55 PM  < end of copied text >    Cardiac Catheterization (12.13.21 @ 07:47) >  CORONARY VESSELS: The coronary circulation is right dominant.  LM:   --  LM: Normal.  LAD:   --  LAD: The vessel was very large sized. Angiography showed minor  luminal irregularities with no flow limiting lesions.  CX:   --  Circumflex: The vessel was medium to large sized. Angiography  showed minor luminal irregularities with no flow limiting lesions.  RCA:   --  RCA: The vessel was large sized (dominant). Angiography showed  minor luminal irregularities with no flow limiting lesions.  DIAGNOSTIC IMPRESSIONS: Minimal CAD  s/p pericardiocentesis of 450 ml bloody fluid  DIAGNOSTIC RECOMMENDATIONS: pericardial fluid analysis.  continue medical therapy for CAD with ASA and statins.  Prepared and signed by  Jeremías Morrell MD  Signed 12/22/2021 19:09:18  < end of copied text >    PAST MEDICAL HISTORY  HTN (hypertension)  Neuropathy    PAST SURGICAL HISTORY  [facility]    SOCIAL HISTORY: + former alcohol abuse, + Former smoker (quit 30 years ago used to smoke 1/2 ppd. Denies drugs    FAMILY HISTORY:  FHx: stomach cancer (Father)    Family History of Cardiovascular Disease:  No   Coronary Artery Disease in first degree relative: No   Sudden Cardiac Death in First degree relative: No     HOME MEDICATIONS:   carvedilol 6.25 mg oral tablet: 1 tab(s) orally 2 times a day (11 Dec 2021 21:32)  gabapentin 600 mg oral tablet: 1 tab(s) orally 2 times a day (11 Dec 2021 21:32)  Amlodipine 5 mg OD    CURRENT OTHER MEDICATIONS:   heparin   Injectable 5600 Unit(s) IV Push every 6 hours PRN For aPTT less than 40  heparin  Infusion.  Unit(s)/Hr (10 mL/Hr) IV Continuous <Continuous>    ALLERGIES:   penicillin (Rash)    REVIEW OF SYMPTOMS:   CONSTITUTIONAL: No fever, no chills, no weight loss, no weight gain, + fatigue   ENMT:  No vertigo; No sinus or throat pain  NECK: No pain or stiffness  CARDIOVASCULAR: No chest pain, + dyspnea, no syncope/presyncope, + fatigue, no palpitations, no dizziness, + Orthopnea, + Paroxsymal nocturnal dyspnea  RESPIRATORY: + shortness of breath, + cough, + wheezing  : No dysuria, no hematuria   GI: no nausea, no diarrhea, no constipation, no abdominal pain,   NEURO: No headache, no slurred speech   MUSCULOSKELETAL: No joint pain or swelling; No muscle, back, or extremity pain  PSYCH: No agitation, no anxiety.    ALL OTHER REVIEW OF SYSTEMS ARE NEGATIVE.    VITAL SIGNS:   T(C): 36.8 (10-24-22 @ 23:53), Max: 36.9 (10-24-22 @ 19:44)  T(F): 98.3 (10-24-22 @ 23:53), Max: 98.5 (10-24-22 @ 19:44)  HR: 82 (10-24-22 @ 23:53) (82 - 94)  BP: 199/93 (10-24-22 @ 23:53) (199/93 - 203/91)  RR: 16 (10-24-22 @ 23:53) (16 - 16)  SpO2: 98% (10-24-22 @ 23:53) (98% - 99%)     PHYSICAL EXAM:   Constitutional: Uncomfortable . No acute distress.   HEENT: Atraumatic and normocephalic , neck is supple . no JVD.   CNS: A&Ox3. No focal deficits.   Respiratory: + labored. + wheezing b/l, No crackles or rhonchi   Cardiovascular: RRR normal s1 s2. No murmur. No rubs or gallop.  Gastrointestinal: Soft, non-tender. +Bowel sounds.   Extremities: 2+ Peripheral Pulses, + 1 pitting edema b/l  Psychiatric: Calm . no agitation.   Skin: Warm and dry    LABS:   ( 24 Oct 2022 21:10 )  Troponin T  0.14<H>,  CPK  X    , CKMB  X    , BNP 7540<H>                        15.9   8.72  )-----------( 290      ( 24 Oct 2022 21:10 )             46.8     10-24    141  |  103  |  20.4<H>  ----------------------------<  95  4.4   |  25.0  |  1.07    Ca    8.9      24 Oct 2022 21:10    TPro  7.1  /  Alb  4.1  /  TBili  0.5  /  DBili  x   /  AST  23  /  ALT  14  /  AlkPhos  113  10-24    PT/INR - ( 24 Oct 2022 21:10 )   PT: 13.6 sec;   INR: 1.17 ratio      PTT - ( 24 Oct 2022 21:10 )  PTT:31.0 sec      EKG shows  NSR with ST abnormalities and new TWI in anterior leads   Prior ECG: Yes     RADIOLOGY & ADDITIONAL STUDIES:     Chest x-ray: cardiomegaly, no pleural effusions as per after hours xray line. Final report / reading pending       Preliminary evaluation, please await official recommendations     Assessment and recommendations are final when note is signed by the attending.

## 2022-10-26 DIAGNOSIS — I42.8 OTHER CARDIOMYOPATHIES: ICD-10-CM

## 2022-10-26 LAB
APTT BLD: 41.4 SEC — HIGH (ref 27.5–35.5)
APTT BLD: 45.6 SEC — HIGH (ref 27.5–35.5)
HCT VFR BLD CALC: 44.1 % — SIGNIFICANT CHANGE UP (ref 39–50)
HGB BLD-MCNC: 14.5 G/DL — SIGNIFICANT CHANGE UP (ref 13–17)
MCHC RBC-ENTMCNC: 29.9 PG — SIGNIFICANT CHANGE UP (ref 27–34)
MCHC RBC-ENTMCNC: 32.9 GM/DL — SIGNIFICANT CHANGE UP (ref 32–36)
MCV RBC AUTO: 90.9 FL — SIGNIFICANT CHANGE UP (ref 80–100)
PLATELET # BLD AUTO: 252 K/UL — SIGNIFICANT CHANGE UP (ref 150–400)
RBC # BLD: 4.85 M/UL — SIGNIFICANT CHANGE UP (ref 4.2–5.8)
RBC # FLD: 13.7 % — SIGNIFICANT CHANGE UP (ref 10.3–14.5)
TROPONIN T SERPL-MCNC: 0.21 NG/ML — HIGH (ref 0–0.06)
WBC # BLD: 6.58 K/UL — SIGNIFICANT CHANGE UP (ref 3.8–10.5)
WBC # FLD AUTO: 6.58 K/UL — SIGNIFICANT CHANGE UP (ref 3.8–10.5)

## 2022-10-26 PROCEDURE — 99152 MOD SED SAME PHYS/QHP 5/>YRS: CPT

## 2022-10-26 PROCEDURE — 93460 R&L HRT ART/VENTRICLE ANGIO: CPT | Mod: 26

## 2022-10-26 PROCEDURE — 93010 ELECTROCARDIOGRAM REPORT: CPT | Mod: 76

## 2022-10-26 PROCEDURE — 99233 SBSQ HOSP IP/OBS HIGH 50: CPT

## 2022-10-26 PROCEDURE — 99233 SBSQ HOSP IP/OBS HIGH 50: CPT | Mod: 25

## 2022-10-26 RX ORDER — ATORVASTATIN CALCIUM 80 MG/1
40 TABLET, FILM COATED ORAL AT BEDTIME
Refills: 0 | Status: DISCONTINUED | OUTPATIENT
Start: 2022-10-26 | End: 2022-10-28

## 2022-10-26 RX ORDER — LISINOPRIL 2.5 MG/1
20 TABLET ORAL DAILY
Refills: 0 | Status: DISCONTINUED | OUTPATIENT
Start: 2022-10-26 | End: 2022-10-28

## 2022-10-26 RX ORDER — LISINOPRIL 2.5 MG/1
10 TABLET ORAL DAILY
Refills: 0 | Status: DISCONTINUED | OUTPATIENT
Start: 2022-10-26 | End: 2022-10-26

## 2022-10-26 RX ADMIN — Medication 50 MILLIGRAM(S): at 05:38

## 2022-10-26 RX ADMIN — HEPARIN SODIUM 1100 UNIT(S)/HR: 5000 INJECTION INTRAVENOUS; SUBCUTANEOUS at 07:49

## 2022-10-26 RX ADMIN — Medication 50 MILLIGRAM(S): at 14:16

## 2022-10-26 RX ADMIN — CARVEDILOL PHOSPHATE 6.25 MILLIGRAM(S): 80 CAPSULE, EXTENDED RELEASE ORAL at 17:22

## 2022-10-26 RX ADMIN — CARVEDILOL PHOSPHATE 6.25 MILLIGRAM(S): 80 CAPSULE, EXTENDED RELEASE ORAL at 05:38

## 2022-10-26 RX ADMIN — GABAPENTIN 600 MILLIGRAM(S): 400 CAPSULE ORAL at 05:44

## 2022-10-26 RX ADMIN — AMLODIPINE BESYLATE 5 MILLIGRAM(S): 2.5 TABLET ORAL at 05:38

## 2022-10-26 RX ADMIN — GABAPENTIN 600 MILLIGRAM(S): 400 CAPSULE ORAL at 17:22

## 2022-10-26 RX ADMIN — HEPARIN SODIUM 900 UNIT(S)/HR: 5000 INJECTION INTRAVENOUS; SUBCUTANEOUS at 01:20

## 2022-10-26 RX ADMIN — HEPARIN SODIUM 1100 UNIT(S)/HR: 5000 INJECTION INTRAVENOUS; SUBCUTANEOUS at 06:52

## 2022-10-26 RX ADMIN — LISINOPRIL 10 MILLIGRAM(S): 2.5 TABLET ORAL at 17:22

## 2022-10-26 RX ADMIN — TAMSULOSIN HYDROCHLORIDE 0.4 MILLIGRAM(S): 0.4 CAPSULE ORAL at 21:16

## 2022-10-26 RX ADMIN — Medication 81 MILLIGRAM(S): at 14:16

## 2022-10-26 RX ADMIN — ATORVASTATIN CALCIUM 40 MILLIGRAM(S): 80 TABLET, FILM COATED ORAL at 21:17

## 2022-10-26 RX ADMIN — Medication 40 MILLIGRAM(S): at 05:38

## 2022-10-26 RX ADMIN — Medication 40 MILLIGRAM(S): at 16:20

## 2022-10-26 NOTE — PROGRESS NOTE ADULT - ASSESSMENT
ASSESSMENT:  76 Bhutanese-speaking M with PMHX Pericardial Effusion s/p drain (12/21), HTN, Neuropathy, Colon CA/Kidney CA s/p resection, BPH presents to [facility] ER c/o SOB/BARNES for past month admitted for Acute CHF Exacerbation, HTN Emergency, and NSTEMI.     PLAN:  Acute CHF Exacerbation, HTN Emergency, NSTEMI  -Suspect NSTEMI type 2 spill from uncontrolled HTN and CHF - continue heparin drip  -EF shows depressed EF without effusion  -Satting well on 2L O2 via NC  -will need ischemic eval - for cath today  -Lasix 40mg IV BID  -Labetalol 10mg IV PRN SBP >180  -ASA 81mg q24  -Trend Cardiac Enzymes  -VTE PPX     HTN  -Amlodipine 5mg q24  -Hydralazine 50mg TID  -Coreg 6.25mg BID  -Labetalol and Lasix as above    BPH  -Flomax 0.4mg qHS    Neuropathy  -Gabapentin 600mg BID  -Check A1C    DVT ppx - on heparin drip   ASSESSMENT:  76 Israeli-speaking M with PMHX Pericardial Effusion s/p drain (12/21), HTN, Neuropathy, Colon CA/Kidney CA s/p resection, BPH presents to [facility] ER c/o SOB/BARNES for past month admitted for Acute CHF Exacerbation, HTN Emergency, and NSTEMI.     PLAN:  Acute CHF Exacerbation, HTN Emergency, NSTEMI  -Suspect NSTEMI type 2 spill from uncontrolled HTN and CHF - continue heparin drip  -EF shows depressed EF without effusion  -Satting well on 2L O2 via NC  -will need ischemic eval - for cath today  -Lasix 40mg IV BID  -Labetalol 10mg IV PRN SBP >180  -ASA 81mg q24  -Trend Cardiac Enzymes  -VTE PPX     P HTN  maybe from L CHF  as per cardiology rec will rule out PE  CTA for the AM ordered    HTN  -Amlodipine 5mg q24  -Hydralazine 50mg TID  -Coreg 6.25mg BID  -Labetalol and Lasix as above    BPH  -Flomax 0.4mg qHS    Neuropathy  -Gabapentin 600mg BID  -Check A1C    DVT ppx - on heparin drip

## 2022-10-26 NOTE — PROGRESS NOTE ADULT - SUBJECTIVE AND OBJECTIVE BOX
Department of Cardiology                                                                  [facility]/[place] [address] [address] [facility]                                                            Telephone: [phone]. Fax:[phone]                                                    Post- Procedure Note: Left Heart Cardiac Catheterization       Narrative: 76y male now s/p R/LHC via RRA, RBV attempt and transferred to RF due to difficult access, no obstructive CAD, PA mean 23mmHg, sat 68%, PCW 13mmHg, procedure performed by Dr. George, received IA heparin, Fentanyl and Versed IV intraprocedurally, arrived to recovery in NAD and HDS, RRA access site stable with compression band, RBV site stable, RFV sheath for removal with manual compression for hemostasis, access sites no bleed/hematoma, distal pulse +,          PAST MEDICAL & SURGICAL HISTORY:  HTN (hypertension)      Neuropathy      Pericardial effusion  s/p drain      Colon cancer  s/p resection      Cancer of kidney      S/P colon resection      S/P pericardial surgery        Home Medications:  carvedilol 6.25 mg oral tablet: 1 tab(s) orally 2 times a day (11 Dec 2021 21:32)  gabapentin 600 mg oral tablet: 1 tab(s) orally 2 times a day (11 Dec 2021 21:32)    penicillin (Rash)      Objective:  Vital Signs Last 24 Hrs  T(C): 36.6 (26 Oct 2022 13:45), Max: 37 (26 Oct 2022 01:15)  T(F): 97.9 (26 Oct 2022 13:45), Max: 98.6 (26 Oct 2022 01:15)  HR: 57 (26 Oct 2022 15:30) (57 - 79)  BP: 137/62 (26 Oct 2022 15:30) (112/66 - 157/74)  BP(mean): --  RR: 16 (26 Oct 2022 15:30) (16 - 19)  SpO2: 96% (26 Oct 2022 15:30) (94% - 99%)    Parameters below as of 26 Oct 2022 15:30  Patient On (Oxygen Delivery Method): room air          PHYSICAL EXAM:  Constitutional: A & O x 3, NAD  HEENT:  Normal oral mucosa, PERRL, EOMI	  Cardiovascular: S1 S2, No murmurs, No JVD  Respiratory: Lungs clear to auscultation	  Gastrointestinal:  Soft, Non-tender, + BS	  Skin: No rashes or cyanosis  Neurologic: No deficit appreciated  Extremities: Normal range of motion, no edema  Vascular: Access site stable, no bleed or hematoma, distal pulses +     Labs:                           14.5   6.58  )-----------( 252      ( 26 Oct 2022 05:44 )             44.1     10-25    139  |  105  |  17.2  ----------------------------<  102<H>  4.4   |  26.0  |  1.22    Ca    8.5      25 Oct 2022 03:10  Phos  3.0     10-25  Mg     1.9     10-25    TPro  7.1  /  Alb  4.1  /  TBili  0.5  /  DBili  x   /  AST  23  /  ALT  14  /  AlkPhos  113  10-24    PT/INR - ( 25 Oct 2022 05:00 )   PT: 13.5 sec;   INR: 1.16 ratio         PTT - ( 26 Oct 2022 12:55 )  PTT:45.6 sec      Assessment:    76y  Male  ****ADD HPI>>>    Plan:  -Formal cath report pending  -Post procedure management/monitoring per protocol  -Access site precautions  -Radial compression band removal at ***  -Bedrest x ***hours post procedure  -Labs and EKG in am  -Repeat ECG if any clinical indication or change on tele  -NS 250mL bolus post cath *****  -Continue current medical therapy  -Dual anti platelet therapy with aspirin/plavix **  -Cont BB with Toprol 50mg po daily **  -Cont statin therapy with Lipitor 10mg po qHS **  -Educated regarding strict adherence with DAPT   -Educated regarding post procedure management and care  -Discussed the importance of RF modification  -Cardiac rehab info provided/referral and communication to cardiac rehab completed  -F/U outpt in 1-2 weeks with Cardiologist  ***  -DISPO:   -Plan for D/C in am if remains HDS, ECG and labs in am stable and without complications  -Plan for D/C home after post procedure recovery completed.                                                                               Department of Cardiology                                                                  [facility]/[place] [address] [facility]                                                            Telephone: [phone]. Fax:[phone]                                                    Post- Procedure Note: Left Heart Cardiac Catheterization       Narrative: 76y male now s/p R/LHC via RRA, RBV attempt and transferred to RF due to difficult access, no obstructive CAD, PA mean 23mmHg, sat 68%, PCW 13mmHg, procedure performed by Dr. George, received IA heparin, Fentanyl and Versed IV intraprocedurally, arrived to recovery in NAD and HDS, RRA access site stable with compression band, RBV site stable, RFV sheath for removal with manual compression for hemostasis, access sites no bleed/hematoma, distal pulse +, plan to transfer back to in-pt unit after recovery period.          PAST MEDICAL & SURGICAL HISTORY:  HTN (hypertension)  Neuropathy  Pericardial effusion  s/p drain  Colon cancer  s/p resection  Cancer of kidney  S/P colon resection  S/P pericardial surgery        Home Medications:  carvedilol 6.25 mg oral tablet: 1 tab(s) orally 2 times a day (11 Dec 2021 21:32)  gabapentin 600 mg oral tablet: 1 tab(s) orally 2 times a day (11 Dec 2021 21:32)    penicillin (Rash)      Objective:  Vital Signs Last 24 Hrs  T(C): 36.6 (26 Oct 2022 13:45), Max: 37 (26 Oct 2022 01:15)  T(F): 97.9 (26 Oct 2022 13:45), Max: 98.6 (26 Oct 2022 01:15)  HR: 57 (26 Oct 2022 15:30) (57 - 79)  BP: 137/62 (26 Oct 2022 15:30) (112/66 - 157/74)  BP(mean): --  RR: 16 (26 Oct 2022 15:30) (16 - 19)  SpO2: 96% (26 Oct 2022 15:30) (94% - 99%)    Parameters below as of 26 Oct 2022 15:30  Patient On (Oxygen Delivery Method): room air          PHYSICAL EXAM:  Constitutional: A & O x 3, NAD  HEENT:  Normal oral mucosa, PERRL, EOMI	  Cardiovascular: S1 S2, No murmurs, No JVD  Respiratory: Lungs clear to auscultation	  Gastrointestinal:  Soft, Non-tender, + BS	  Skin: No rashes or cyanosis  Neurologic: No deficit appreciated  Extremities: Normal range of motion, no edema  Vascular: Access sites stable, no bleed or hematoma, distal pulses +     Labs:                           14.5   6.58  )-----------( 252      ( 26 Oct 2022 05:44 )             44.1     10-25    139  |  105  |  17.2  ----------------------------<  102<H>  4.4   |  26.0  |  1.22    Ca    8.5      25 Oct 2022 03:10  Phos  3.0     10-25  Mg     1.9     10-25    TPro  7.1  /  Alb  4.1  /  TBili  0.5  /  DBili  x   /  AST  23  /  ALT  14  /  AlkPhos  113  10-24    PT/INR - ( 25 Oct 2022 05:00 )   PT: 13.5 sec;   INR: 1.16 ratio         PTT - ( 26 Oct 2022 12:55 )  PTT:45.6 sec      Assessment: 75 y/o male with PMHx of HTN, colon CA (sp chemo 3 years ago) and Pericardial effusion in December 2021 s/p window and drain, presents to the ED c/o SOB and BARNES for the past month. Pt states last month he had the flu and since then he had had cough with clear sputum, wheezing and congestion. Starting last week that gradually developed into BARNES and SOB at rest. Pt usually is able to sleep laying flat but recently has had to sit up more because he feels he is unable to breath. Pt has not seen cardiologist since last hospitalization. Now for R/LHC. Recent echo with decreased EF and wall motion abnormalities. Pt denies chest pain, back pain, abdominal pain or palpitations.     Now s/p R/LHC via RRA, RBV attempt and transferred to [facility] due to difficult access, no obstructive CAD, PA mean 23mmHg, sat 68%, PCW 13mmHg, procedure performed by Dr. George, received IA heparin, Fentanyl and Versed IV intraprocedurally, arrived to recovery in NAD and HDS, RRA access site stable with compression band, RBV site stable, RFV sheath for removal with manual compression for hemostasis, access sites no bleed/hematoma, distal pulse +, plan to transfer back to in-pt unit after recovery period.        Plan:  -Formal cath report pending  -Post procedure management/monitoring per protocol  -Access site precautions  -Radial compression band removal at 1715  -Bedrest x 2 hours post RFV sheath removal   -No IVH due to heart failure status/BiV dysfunction  -Continue current medical therapy/optimize GDMT  -Educated regarding post procedure management and care  -Discussed the importance of RF modification  -DISPO: transfer back to in-pt unit for continued management  -Plan for D/C in am if remains HDS, ECG and labs in am stable and without complications  -Plan for D/C home after post procedure recovery completed.

## 2022-10-26 NOTE — GOALS OF CARE CONVERSATION - ADVANCED CARE PLANNING - CONVERSATION DETAILS
Discussed with patient Health Care Proxy meaning and form. Patient states he will designate his daughter Ena to be his HCP and would want to receive CPR and placed on a ventilator if it was necessary to save his life.

## 2022-10-26 NOTE — PROGRESS NOTE ADULT - SUBJECTIVE AND OBJECTIVE BOX
[facility]                                                         CARDIOLOGY AT [facility]                                                                  [address], [place] [address]                                                         Telephone: [phone]. Fax:[phone]                                                                             PROGRESS NOTE    Reason for follow up:  Follow up on sob  Update: Patient does feel better  trops are trending upward  Ekg deep ischemic changes via ant wall  No chest pain    Review of symptoms:   Cardiac:  No chest pain. No dyspnea. No palpitations.  Respiratory: no cough. No dyspnea  Gastrointestinal: No diarrhea. No abdominal pain. No bleeding.   Neuro: No focal neuro complaints.      Vitals:  T(C): 36.9 (10-26-22 @ 08:07), Max: 37 (10-26-22 @ 01:15)  HR: 58 (10-26-22 @ 08:07) (58 - 75)  BP: 128/65 (10-26-22 @ 08:07) (128/65 - 166/94)  RR: 18 (10-26-22 @ 08:07) (16 - 20)  SpO2: 99% (10-26-22 @ 08:07) (97% - 99%)  Wt(kg): --  I&O's Summary    25 Oct 2022 07:01  -  26 Oct 2022 07:00  --------------------------------------------------------  IN: 113 mL / OUT: 1150 mL / NET: -1037 mL      Weight (kg): 91.5 (10-24 @ 22:36)    PHYSICAL EXAM:  Appearance: Comfortable. No acute distress  HEENT:  Atraumatic. Normocephalic.  Normal oral mucosa  Neurologic: A & O x 3, no gross focal deficits.  Cardiovascular: RRR S1 S2, No murmur, no rubs/gallops. No JVD  Respiratory:  Mild exp wheeze cleared with coughing  Gastrointestinal:  Soft, Non-tender, + BS  Lower Extremities: No edema  Psychiatry: Patient is calm. No agitation.   Skin: warm and dry.      CURRENT MEDICATIONS:  MEDICATIONS  (STANDING):  amLODIPine   Tablet 5 milliGRAM(s) Oral daily  aspirin enteric coated 81 milliGRAM(s) Oral daily  carvedilol 6.25 milliGRAM(s) Oral every 12 hours  furosemide   Injectable 40 milliGRAM(s) IV Push two times a day  gabapentin 600 milliGRAM(s) Oral two times a day  heparin  Infusion.  Unit(s)/Hr (10 mL/Hr) IV Continuous <Continuous>  hydrALAZINE 50 milliGRAM(s) Oral three times a day  influenza  Vaccine (HIGH DOSE) 0.7 milliLiter(s) IntraMuscular once  tamsulosin 0.4 milliGRAM(s) Oral at bedtime      LABS:	 	  CARDIAC MARKERS ( 26 Oct 2022 05:44 )  x     / 0.21 ng/mL / x     / x     / x      p-BNP 26 Oct 2022 05:44: x    , CARDIAC MARKERS ( 25 Oct 2022 03:10 )  x     / 0.17 ng/mL / 91 U/L / x     / x      p-BNP 25 Oct 2022 03:10: x    , CARDIAC MARKERS ( 24 Oct 2022 21:10 )  x     / 0.14 ng/mL / x     / x     / x      p-BNP 24 Oct 2022 21:10: 7540 pg/mL                          14.5   6.58  )-----------( 252      ( 26 Oct 2022 05:44 )             44.1     10-25    139  |  105  |  17.2  ----------------------------<  102<H>  4.4   |  26.0  |  1.22    Ca    8.5      25 Oct 2022 03:10  Phos  3.0     10-25  Mg     1.9     10-25    TPro  7.1  /  Alb  4.1  /  TBili  0.5  /  DBili  x   /  AST  23  /  ALT  14  /  AlkPhos  113  10-24    proBNP: Serum Pro-Brain Natriuretic Peptide: 7540 pg/mL (10-24 @ 21:10)    Lipid Profile: Date: 10-25 @ 03:10  Total cholesterol 153; Direct LDL: --; HDL: 49; Triglycerides:68    HgA1c:   TSH: Thyroid Stimulating Hormone, Serum: 2.53 uIU/mL    TELEMETRY: NSR  ECG:  	      DIAGNOSTIC TESTING:  < from: TTE Echo Complete w/ Contrast w/ Doppler (10.25.22 @ 20:20) >  Summary:   1. Technically difficult study.   2. Endocardial visualization was enhanced with intravenous echo contrast.   3. Left ventricular ejection fraction, by visual estimation, is 20 to   25%.   4. Severely decreased global left ventricular systolic function.   5. Multiple left ventricular regional wall motion abnormalities exist.   See wall motion findings.   6. Spectral Doppler shows pseudonormal pattern of left ventricular   myocardial filling (Grade II diastolic dysfunction).   7. Moderately reduced RV systolic function.   8. Mild to moderate mitral valve regurgitation.   9. Mild tricuspid regurgitation.  10. Moderate aortic regurgitation.  11. Dilatation of the aortic root and ascending aorta.  12. Estimated pulmonary artery systolic pressure is 61.1 mmHg assuming a   right atrial pressure of 3 mmHg, which is consistent with severe   pulmonary hypertension.  13. In comparison to TTE 12/20/2021 the VEF is now severely reduced.  14. Results d/w Dr. Gordon.

## 2022-10-26 NOTE — GOALS OF CARE CONVERSATION - ADVANCED CARE PLANNING - WHAT MATTERS MOST
The pt. And his wife have been given discharge instructions, using teach back method. Medications new, changed, continued and stopped, thoroughly explained by [facility] pharmacist.  Follow up appointments scheduled dates and times given to the pt. Care of heart cath right groin site explained. Both the pt. And his wife have verbalized understanding and declined further questions. 1205- home medications were delivered to the room. The pt. Has called out as he has finished his lunch now. Taken by wheel chair to [facility]. That he be given a chance to live.

## 2022-10-26 NOTE — PROGRESS NOTE ADULT - ASSESSMENT
76 [place]akut-speaking M with PMHX Pericardial Effusion s/p drain (12/21), HTN, Neuropathy, Colon CA/Kidney CA s/p resection, BPH presents to [facility] ER c/o SOB/BARNES for past month admitted for Acute CHF Exacerbation, HTN Emergency, and NSTEMI.     PLAN:  Acute CHF Exacerbation, HTN Emergency, NSTEMI  -Discussed with Cardiology, new decreased EF on TTE with uptrending Troponin  -Lasix 40mg IV BID  -Labetalol 10mg IV PRN SBP >180  -ASA 81mg q24  -Heparin gtt for NSTEMI  -For right and left heart cath today    HTN  -Amlodipine 5mg q24  -Hydralazine 50mg TID  -Coreg 6.25mg BID  -Labetalol and Lasix as above    GOC; Patient made his daughter his HCP. Wants Full Code    BPH  -Flomax 0.4mg qHS    Neuropathy  -Gabapentin 600mg BID

## 2022-10-26 NOTE — PROGRESS NOTE ADULT - SUBJECTIVE AND OBJECTIVE BOX
Interventional Cardiology NP Precath Note      HPI: 75 y/o male with PMHx of HTN, colon CA (sp chemo 3 years ago) and Pericardial effusion in December 2021 s/p window and drain, presents to the ED c/o SOB and BARNES for the past month. Pt states last month he had the flu and since then he had had cough with clear sputum, wheezing and congestion. Starting last week that gradually developed into BARNES and SOB at rest. Pt usually is able to sleep laying flat but recently has had to sit up more because he feels he is unable to breath. Pt has not seen cardiologist since last hospitalization. Now for R/LHC. Recent echo with decreased EF and wall motion abnormalities. Pt denies chest pain, back pain, abdominal pain or palpitations.         ECHO: < from: TTE Echo Complete w/ Contrast w/ Doppler (10.25.22 @ 20:20) >  Summary:   1. Technically difficult study.   2. Endocardial visualization was enhanced with intravenous echo contrast.   3. Left ventricular ejection fraction, by visual estimation, is 20 to   25%.   4. Severely decreased global left ventricular systolic function.   5. Multiple left ventricular regional wall motion abnormalities exist.   See wall motion findings.   6. Spectral Doppler shows pseudonormal pattern of left ventricular   myocardial filling (Grade II diastolic dysfunction).   7. Moderately reduced RV systolic function.   8. Mild to moderate mitral valve regurgitation.   9. Mild tricuspid regurgitation.  10. Moderate aortic regurgitation.  11. Dilatation of the aortic root and ascending aorta.  12. Estimated pulmonary artery systolic pressure is 61.1 mmHg assuming a   right atrial pressure of 3 mmHg, which is consistent with severe   pulmonary hypertension.  13. In comparison to TTE 12/20/2021 the VEF is now severely reduced.  14. Results d/w Dr. Gordon.    MD Michael Electronically signed on 10/25/2022 at 10:18:36 PM    < end of copied text >      Prior Cath Hx: < from: Cardiac Catheterization (12.13.21 @ 07:47) >  CORONARY VESSELS: The coronary circulation is right dominant.  LM:   --  LM: Normal.  LAD:   --  LAD: The vessel was very large sized. Angiography showed minor  luminal irregularities with no flow limiting lesions.  CX:   --  Circumflex: The vessel was medium to large sized. Angiography  showed minor luminal irregularities with no flow limiting lesions.  RCA:   --  RCA: The vessel was large sized (dominant). Angiography showed  minor luminal irregularities with no flow limiting lesions.  COMPLICATIONS: No complications occurred during the cath lab visit.  DIAGNOSTIC IMPRESSIONS: Minimal CAD  s/p pericardiocentesis of 450 ml bloody fluid  DIAGNOSTIC RECOMMENDATIONS: pericardial fluid analysis.  continue medical therapy for CAD with ASA and statins.  Prepared and signed by  Jeremías Morrell MD  Signed 12/22/2021 19:09:18    < end of copied text >        ALL: NKDA, NKFA. Denies shellfish/Contrast dye allergy.  PAST MEDICAL & SURGICAL HISTORY:  HTN (hypertension)      Neuropathy      Pericardial effusion  s/p drain      Colon cancer  s/p resection      Cancer of kidney      S/P colon resection      S/P pericardial surgery        SocHX: Denies EtoH/TOB/IVDU    MEDS:   acetaminophen     Tablet .. 650 milliGRAM(s) Oral every 6 hours PRN  aluminum hydroxide/magnesium hydroxide/simethicone Suspension 30 milliLiter(s) Oral every 4 hours PRN  amLODIPine   Tablet 5 milliGRAM(s) Oral daily  aspirin enteric coated 81 milliGRAM(s) Oral daily  atorvastatin 40 milliGRAM(s) Oral at bedtime  carvedilol 6.25 milliGRAM(s) Oral every 12 hours  furosemide   Injectable 40 milliGRAM(s) IV Push two times a day  gabapentin 600 milliGRAM(s) Oral two times a day  heparin   Injectable 5600 Unit(s) IV Push every 6 hours PRN  heparin  Infusion.  Unit(s)/Hr IV Continuous <Continuous>  hydrALAZINE 50 milliGRAM(s) Oral three times a day  influenza  Vaccine (HIGH DOSE) 0.7 milliLiter(s) IntraMuscular once  labetalol Injectable 10 milliGRAM(s) IV Push every 4 hours PRN  melatonin 3 milliGRAM(s) Oral at bedtime PRN  ondansetron Injectable 4 milliGRAM(s) IV Push every 8 hours PRN  tamsulosin 0.4 milliGRAM(s) Oral at bedtime    T(C): 36.9 (10-26-22 @ 08:07), Max: 37 (10-26-22 @ 01:15)  HR: 58 (10-26-22 @ 08:07) (58 - 75)  BP: 128/65 (10-26-22 @ 08:07) (128/65 - 166/94)  RR: 18 (10-26-22 @ 08:07) (16 - 20)  SpO2: 99% (10-26-22 @ 08:07) (97% - 99%)  Wt(kg): --                              14.5   6.58  )-----------( 252      ( 26 Oct 2022 05:44 )             44.1     10-25    139  |  105  |  17.2  ----------------------------<  102<H>  4.4   |  26.0  |  1.22    Ca    8.5      25 Oct 2022 03:10  Phos  3.0     10-25  Mg     1.9     10-25    TPro  7.1  /  Alb  4.1  /  TBili  0.5  /  DBili  x   /  AST  23  /  ALT  14  /  AlkPhos  113  10-24    CARDIAC MARKERS ( 26 Oct 2022 05:44 )  x     / 0.21 ng/mL / x     / x     / x      CARDIAC MARKERS ( 25 Oct 2022 03:10 )  x     / 0.17 ng/mL / 91 U/L / x     / x      CARDIAC MARKERS ( 24 Oct 2022 21:10 )  x     / 0.14 ng/mL / x     / x     / x          				  ASA ____3_				Mallampati class: _______2__	            BRA: ______1.2%___

## 2022-10-26 NOTE — PROGRESS NOTE ADULT - SUBJECTIVE AND OBJECTIVE BOX
CC: exertional SOB    INTERVAL HPI/OVERNIGHT EVENTS:  no acute events    Vital Signs Last 24 Hrs  T(C): 36.9 (26 Oct 2022 08:07), Max: 37 (26 Oct 2022 01:15)  T(F): 98.5 (26 Oct 2022 08:07), Max: 98.6 (26 Oct 2022 01:15)  HR: 58 (26 Oct 2022 08:07) (58 - 75)  BP: 128/65 (26 Oct 2022 08:07) (128/65 - 166/94)  BP(mean): --  RR: 18 (26 Oct 2022 08:07) (16 - 20)  SpO2: 99% (26 Oct 2022 08:07) (97% - 99%)    Parameters below as of 26 Oct 2022 08:07  Patient On (Oxygen Delivery Method): nasal cannula  O2 Flow (L/min): 2    PHYSICAL EXAM:  General: in no acute distress  Eyes: PERRLA, EOMI; conjunctiva and sclera clear  Respiratory: No wheezes, rales or rhonchi  Cardiovascular: Regular rate and rhythm. S1 and S2 Normal; No murmurs, gallops or rubs  Gastrointestinal: Soft non-tender non-distended; Normal bowel sounds  Extremities: Normal range of motion, No clubbing, cyanosis or edema  Vascular: Peripheral pulses palpable 2+ bilaterally  Neurological: Alert and oriented x4  Skin: Warm and dry. No acute rash  Psychiatric: Cooperative and appropriate    I&O's Detail    25 Oct 2022 07:01  -  26 Oct 2022 07:00  --------------------------------------------------------  IN:    Heparin Infusion: 63 mL    Oral Fluid: 50 mL  Total IN: 113 mL    OUT:    Voided (mL): 1150 mL  Total OUT: 1150 mL    Total NET: -1037 mL    CARDIAC MARKERS ( 26 Oct 2022 05:44 )  x     / 0.21 ng/mL / x     / x     / x      CARDIAC MARKERS ( 25 Oct 2022 03:10 )  x     / 0.17 ng/mL / 91 U/L / x     / x      CARDIAC MARKERS ( 24 Oct 2022 21:10 )  x     / 0.14 ng/mL / x     / x     / x                   14.5   6.58  )-----------( 252      ( 26 Oct 2022 05:44 )             44.1     25 Oct 2022 03:10    139    |  105    |  17.2   ----------------------------<  102    4.4     |  26.0   |  1.22     Ca    8.5        25 Oct 2022 03:10  Phos  3.0       25 Oct 2022 03:10  Mg     1.9       25 Oct 2022 03:10    TPro  7.1    /  Alb  4.1    /  TBili  0.5    /  DBili  x      /  AST  23     /  ALT  14     /  AlkPhos  113    24 Oct 2022 21:10    PT/INR - ( 25 Oct 2022 05:00 )   PT: 13.5 sec;   INR: 1.16 ratio      PTT - ( 26 Oct 2022 05:44 )  PTT:41.4 sec  CAPILLARY BLOOD GLUCOSE    LIVER FUNCTIONS - ( 24 Oct 2022 21:10 )  Alb: 4.1 g/dL / Pro: 7.1 g/dL / ALK PHOS: 113 U/L / ALT: 14 U/L / AST: 23 U/L / GGT: x           MEDICATIONS  (STANDING):  amLODIPine   Tablet 5 milliGRAM(s) Oral daily  aspirin enteric coated 81 milliGRAM(s) Oral daily  atorvastatin 40 milliGRAM(s) Oral at bedtime  carvedilol 6.25 milliGRAM(s) Oral every 12 hours  furosemide   Injectable 40 milliGRAM(s) IV Push two times a day  gabapentin 600 milliGRAM(s) Oral two times a day  heparin  Infusion.  Unit(s)/Hr (10 mL/Hr) IV Continuous <Continuous>  hydrALAZINE 50 milliGRAM(s) Oral three times a day  influenza  Vaccine (HIGH DOSE) 0.7 milliLiter(s) IntraMuscular once  tamsulosin 0.4 milliGRAM(s) Oral at bedtime    MEDICATIONS  (PRN):  acetaminophen     Tablet .. 650 milliGRAM(s) Oral every 6 hours PRN Temp greater or equal to 38C (100.4F), Mild Pain (1 - 3)  aluminum hydroxide/magnesium hydroxide/simethicone Suspension 30 milliLiter(s) Oral every 4 hours PRN Dyspepsia  heparin   Injectable 5600 Unit(s) IV Push every 6 hours PRN For aPTT less than 40  labetalol Injectable 10 milliGRAM(s) IV Push every 4 hours PRN SBP >160  melatonin 3 milliGRAM(s) Oral at bedtime PRN Insomnia  ondansetron Injectable 4 milliGRAM(s) IV Push every 8 hours PRN Nausea and/or Vomiting      RADIOLOGY & ADDITIONAL TESTS:

## 2022-10-26 NOTE — PROGRESS NOTE ADULT - ASSESSMENT
A/P:  Patient is a 76y old  Male who presents with a chief complaint of SOB x 1 month, NSTEMI for /RLHC  -Proceed with [facility] as planned  -EKG shows  NSR with ST abnormalities and new TWI in anterior leads   -TTE Echo Limited or F/U 12.20.21 shows EF of 50 to 55%. Small pericardial effusion. Small circumferential pericardial effusion with mild MV inflow variation without wilian sign of chamber collapse (suboptimal imaging). Moderate aortic regurgitation.  -Continue ASA and statin  -Keep NPO  -Interventionalist to get consent      Risks & benefits of procedure and sedation and risks and benefits for the alternative therapy have been explained to the patient in layman’s terms including but not limited to: allergic reaction, bleeding, infection, arrhythmia, respiratory compromise, renal and vascular compromise, limb damage, MI, CVA, emergent CABG/Vascular Surgery and death. Informed consent obtained and in chart.

## 2022-10-26 NOTE — PROGRESS NOTE ADULT - ASSESSMENT
75 y/o male with PMHx of HTN, colon CA (sp chemo 3 years ago) and Pericardial effusion in December 2021 s/p cath 2021 with minimal cad who presents with sob with cough and congestion.  We were called due to sob and positive trop.  Echo EF was 50% dropped to 20%  trops are trending up burt No chest pain

## 2022-10-26 NOTE — PROGRESS NOTE ADULT - SUBJECTIVE AND OBJECTIVE BOX
CC: Acute CHF Exacerbation, NSTEMI, HTN Emergency     INTERVAL HPI/OVERNIGHT EVENTS: Patient seen and examined. Denies chest pain, SOB at present. No nausea, vomiting, fever, chills. EKG with new inverted T waves in anterior leads. Trop trending up. Echo with new low EF, from 50%--> 20%. NPO for R&L heart cath today.     Vital Signs Last 24 Hrs  T(C): 36.9 (26 Oct 2022 08:07), Max: 37 (26 Oct 2022 01:15)  T(F): 98.5 (26 Oct 2022 08:07), Max: 98.6 (26 Oct 2022 01:15)  HR: 58 (26 Oct 2022 08:07) (58 - 75)  BP: 128/65 (26 Oct 2022 08:07) (128/65 - 166/94)  BP(mean): --  RR: 18 (26 Oct 2022 08:07) (16 - 20)  SpO2: 99% (26 Oct 2022 08:07) (97% - 99%)    Parameters below as of 26 Oct 2022 08:07  Patient On (Oxygen Delivery Method): nasal cannula  O2 Flow (L/min): 2    PE:  Constitutional: Well-appearing, NAD, VSS  Head: NC/AT  Eyes: PERRL, EOMI, anicteric sclera, conjunctiva WNL  ENT: MMM  Neck: Supple, Non-tender  Cardio: RRR, s1/s2  Resp: Decrease BS B/L, no wheeze, faint base crackles  Abd: Soft, Non-tender, Non-distended, no rebound/guarding/rigidity  MSK: moving all extremities, no motor weakness, full ROM x4  Ext: palpable distal pulses, good capillary refill  Psych: appropriate, cooperative  Neuro: CN II-XII grossly intact, no focal deficits  Skin: Warm/Dry. No rashes.       I&O's Detail    25 Oct 2022 07:01  -  26 Oct 2022 07:00  --------------------------------------------------------  IN:    Heparin Infusion: 63 mL    Oral Fluid: 50 mL  Total IN: 113 mL    OUT:    Voided (mL): 1150 mL  Total OUT: 1150 mL    Total NET: -1037 mL          CARDIAC MARKERS ( 26 Oct 2022 05:44 )  x     / 0.21 ng/mL / x     / x     / x      CARDIAC MARKERS ( 25 Oct 2022 03:10 )  x     / 0.17 ng/mL / 91 U/L / x     / x      CARDIAC MARKERS ( 24 Oct 2022 21:10 )  x     / 0.14 ng/mL / x     / x     / x                                14.5   6.58  )-----------( 252      ( 26 Oct 2022 05:44 )             44.1     25 Oct 2022 03:10    139    |  105    |  17.2   ----------------------------<  102    4.4     |  26.0   |  1.22     Ca    8.5        25 Oct 2022 03:10  Phos  3.0       25 Oct 2022 03:10  Mg     1.9       25 Oct 2022 03:10    TPro  7.1    /  Alb  4.1    /  TBili  0.5    /  DBili  x      /  AST  23     /  ALT  14     /  AlkPhos  113    24 Oct 2022 21:10    PT/INR - ( 25 Oct 2022 05:00 )   PT: 13.5 sec;   INR: 1.16 ratio         PTT - ( 26 Oct 2022 05:44 )  PTT:41.4 sec  CAPILLARY BLOOD GLUCOSE        LIVER FUNCTIONS - ( 24 Oct 2022 21:10 )  Alb: 4.1 g/dL / Pro: 7.1 g/dL / ALK PHOS: 113 U/L / ALT: 14 U/L / AST: 23 U/L / GGT: x               MEDICATIONS  (STANDING):  amLODIPine   Tablet 5 milliGRAM(s) Oral daily  aspirin enteric coated 81 milliGRAM(s) Oral daily  atorvastatin 40 milliGRAM(s) Oral at bedtime  carvedilol 6.25 milliGRAM(s) Oral every 12 hours  furosemide   Injectable 40 milliGRAM(s) IV Push two times a day  gabapentin 600 milliGRAM(s) Oral two times a day  heparin  Infusion.  Unit(s)/Hr (10 mL/Hr) IV Continuous <Continuous>  hydrALAZINE 50 milliGRAM(s) Oral three times a day  influenza  Vaccine (HIGH DOSE) 0.7 milliLiter(s) IntraMuscular once  tamsulosin 0.4 milliGRAM(s) Oral at bedtime    MEDICATIONS  (PRN):  acetaminophen     Tablet .. 650 milliGRAM(s) Oral every 6 hours PRN Temp greater or equal to 38C (100.4F), Mild Pain (1 - 3)  aluminum hydroxide/magnesium hydroxide/simethicone Suspension 30 milliLiter(s) Oral every 4 hours PRN Dyspepsia  heparin   Injectable 5600 Unit(s) IV Push every 6 hours PRN For aPTT less than 40  labetalol Injectable 10 milliGRAM(s) IV Push every 4 hours PRN SBP >160  melatonin 3 milliGRAM(s) Oral at bedtime PRN Insomnia  ondansetron Injectable 4 milliGRAM(s) IV Push every 8 hours PRN Nausea and/or Vomiting      RADIOLOGY & ADDITIONAL TESTS:  ACC: [id number] EXAM:  ECHO TTE WITH CON COMP W DOPP                          PROCEDURE DATE:  10/25/2022          INTERPRETATION:  TRANSTHORACIC ECHOCARDIOGRAM REPORT        Patient Name:   ONEIDA OCHOA Patient Location: Emergency  Medical Rec #:  [id number]             Accession #:      [id number]  Account #:      [id number]             Height:           71.0 in 180.3 cm  YOB: 1945           Weight:           201.8 lb 91.51 kg  Patient Age:    76 years             BSA:      2.12 m²  Patient Gender: M                    BP:               156/79 mmHg      Date of Exam:        10/25/2022 8:20:04 PM  Sonographer:         Medina Kilpatrick  Referring Physician: Steve Gordon    Procedure:     2D Echo/Doppler/Color DopplerComplete and Echocardiogram   with                 Definity Contrast.  Indications:   Dyspnea, unspecified - R06.00  Diagnosis:     Dyspnea, unspecified - R06.00  Study Details: Technically difficult study.        2D AND M-MODE MEASUREMENTS (normal ranges within parentheses):  Left                 Normal   Aorta/Left            Normal  Ventricle:                    Atrium:  IVSd (2D):    1.16  (0.7-1.1) Aortic Root    3.90  (2.4-3.7)                 cm             (2D):           cm  LVPWd (2D): 1.19  (0.7-1.1) Left Atrium    4.80  (1.9-4.0)                 cm             (2D):           cm  LVIDd (2D):   6.37  (3.4-5.7) LA Volume      50.6                 cm             Index         ml/m²  LVIDs (2D):   5.73            Right Ventricle:               cm             TAPSE:           2.02 cm  LV FS (2D):   10.0   (>25%)                  %  LV EF (2D):   21 %   (>55%)  Relative Wall 0.37   (<0.42)  Thickness    LV SYSTOLIC FUNCTION BY 2D PLANIMETRY (MOD):  EF-A4C View: 28.4 % EF-A2C View: 22.8 % EF-Biplane: 27 %    LV DIASTOLIC FUNCTION:  MV Peak E: 0.96 m/s e', MV Viky: 0.06 m/s  MV Peak A: 0.43 m/s E/e' Ratio: 14.83  E/A Ratio: 2.24     Decel Time: 155 msec    SPECTRAL DOPPLER ANALYSIS (where applicable):  Mitral Valve:  MV P1/2 Time: 44.95 msec  MV Area, PHT: 4.89 cm²    Mitral Insufficiency by PISA:  MR Volume: 28.19 ml MR Flow Rate: 82.51 ml/s MR EROA: 14.60 mm²    Aortic Valve: AoV Max Eris: 2.07 m/s AoV Peak P.1 mmHg AoV Mean P.0 mmHg    LVOT Vmax: 1.07 m/s LVOT VTI: 0.208 m LVOT Diameter: 2.30 cm    AoV Area, Vmax: 2.15 cm² AoV Area, VTI: 2.13 cm² AoV Area, Vmn: 1.94 cm²  Ao VTI: 0.406  Aortic Insufficiency:  AI Half-time:  307 msec  AI Decel Rate: 4.61 m/s²    Tricuspid Valve and PA/RV Systolic Pressure: TRMax Velocity: 3.81 m/s RA   Pressure: 3 mmHg RVSP/PASP: 61.1 mmHg      PHYSICIAN INTERPRETATION:  Left Ventricle: Endocardial visualization was enhanced with intravenous   echo contrast. The left ventricular internal cavity size is moderately   increased. Left ventricular wall thickness is normal.  Global LV systolic function was severely decreased. Left ventricular   ejection fraction, by visual estimation, is 20 to 25%. Spectral Doppler   shows pseudonormal pattern of left ventricular myocardial filling (Grade   II diastolic dysfunction).      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 The basal and mid anterolateral wall, basal  anterior segment, and basal inferior segment are akinetic. The basal and   mid  anterior septum, basal andmid inferior septum, basal and mid   inferolateral  wall, mid anterior segment, and mid inferior segment are hypokinetic.    Right Ventricle: The right ventricular size is normal. RV systolic   function is moderately reduced.  Left Atrium: Severely enlarged left atrium.  Pericardium: There is no evidence of pericardial effusion.  Mitral Valve: There is mild mitral annular calcification. Mild to   moderate mitral valve regurgitation is seen.  Tricuspid Valve: The tricuspid valve is normal in structure. Mild   tricuspid regurgitation is visualized. Estimated pulmonary artery   systolic pressure is 61.1 mmHg assuming a right atrial pressure of 3   mmHg, which is consistent with severe pulmonary hypertension.  Aortic Valve: The aortic valve was not well visualized. Moderate aortic   valve regurgitation is seen.  Pulmonic Valve: The pulmonic valve is normal. Mild pulmonic valve   regurgitation.  Aorta: There is dilatation of the aortic root and ascending aorta.  Pulmonary Artery: The pulmonaryartery is not well seen.  Venous: The inferior vena cava was normal sized, with respiratory size   variation greater than 50%.  Additional Comments: A pacer wire is visualized in the right atrium and   right ventricle.      Summary:   1. Technically difficult study.   2. Endocardial visualization was enhanced with intravenous echo contrast.   3. Left ventricular ejection fraction, by visual estimation, is 20 to   25%.   4. Severely decreased global left ventricular systolic function.   5. Multiple left ventricular regional wall motion abnormalities exist.   See wall motion findings.   6. Spectral Doppler shows pseudonormal pattern of left ventricular   myocardial filling (Grade II diastolic dysfunction).   7. Moderately reduced RV systolic function.   8. Mild to moderate mitral valve regurgitation.   9. Mild tricuspid regurgitation.  10. Moderate aortic regurgitation.  11. Dilatation of the aortic root and ascending aorta.  12. Estimated pulmonary artery systolic pressure is 61.1 mmHg assuming a   right atrial pressure of 3 mmHg, which is consistent with severe   pulmonary hypertension.  13. In comparison to TTE 2021 the VEF is now severely reduced.  14. Results d/w Dr. Gordon.    MD Michael Electronically signed on 10/25/2022 at 10:18:36 PM        *** Final ***

## 2022-10-27 DIAGNOSIS — N40.0 BENIGN PROSTATIC HYPERPLASIA WITHOUT LOWER URINARY TRACT SYMPTOMS: ICD-10-CM

## 2022-10-27 DIAGNOSIS — G62.9 POLYNEUROPATHY, UNSPECIFIED: ICD-10-CM

## 2022-10-27 DIAGNOSIS — I51.9 HEART DISEASE, UNSPECIFIED: ICD-10-CM

## 2022-10-27 DIAGNOSIS — I42.8 OTHER CARDIOMYOPATHIES: ICD-10-CM

## 2022-10-27 DIAGNOSIS — I50.41 ACUTE COMBINED SYSTOLIC (CONGESTIVE) AND DIASTOLIC (CONGESTIVE) HEART FAILURE: ICD-10-CM

## 2022-10-27 DIAGNOSIS — R09.02 HYPOXEMIA: ICD-10-CM

## 2022-10-27 DIAGNOSIS — I27.20 PULMONARY HYPERTENSION, UNSPECIFIED: ICD-10-CM

## 2022-10-27 LAB
ANION GAP SERPL CALC-SCNC: 13 MMOL/L — SIGNIFICANT CHANGE UP (ref 5–17)
BUN SERPL-MCNC: 29 MG/DL — HIGH (ref 8–20)
CALCIUM SERPL-MCNC: 8.7 MG/DL — SIGNIFICANT CHANGE UP (ref 8.4–10.5)
CHLORIDE SERPL-SCNC: 101 MMOL/L — SIGNIFICANT CHANGE UP (ref 96–108)
CO2 SERPL-SCNC: 29 MMOL/L — SIGNIFICANT CHANGE UP (ref 22–29)
CREAT SERPL-MCNC: 1.25 MG/DL — SIGNIFICANT CHANGE UP (ref 0.5–1.3)
D DIMER BLD IA.RAPID-MCNC: 242 NG/ML DDU — HIGH
EGFR: 59 ML/MIN/1.73M2 — LOW
GLUCOSE SERPL-MCNC: 77 MG/DL — SIGNIFICANT CHANGE UP (ref 70–99)
HCT VFR BLD CALC: 43.9 % — SIGNIFICANT CHANGE UP (ref 39–50)
HGB BLD-MCNC: 14.6 G/DL — SIGNIFICANT CHANGE UP (ref 13–17)
MAGNESIUM SERPL-MCNC: 2.2 MG/DL — SIGNIFICANT CHANGE UP (ref 1.6–2.6)
MCHC RBC-ENTMCNC: 30.1 PG — SIGNIFICANT CHANGE UP (ref 27–34)
MCHC RBC-ENTMCNC: 33.3 GM/DL — SIGNIFICANT CHANGE UP (ref 32–36)
MCV RBC AUTO: 90.5 FL — SIGNIFICANT CHANGE UP (ref 80–100)
PLATELET # BLD AUTO: 236 K/UL — SIGNIFICANT CHANGE UP (ref 150–400)
POTASSIUM SERPL-MCNC: 3.8 MMOL/L — SIGNIFICANT CHANGE UP (ref 3.5–5.3)
POTASSIUM SERPL-SCNC: 3.8 MMOL/L — SIGNIFICANT CHANGE UP (ref 3.5–5.3)
RBC # BLD: 4.85 M/UL — SIGNIFICANT CHANGE UP (ref 4.2–5.8)
RBC # FLD: 13.5 % — SIGNIFICANT CHANGE UP (ref 10.3–14.5)
SODIUM SERPL-SCNC: 143 MMOL/L — SIGNIFICANT CHANGE UP (ref 135–145)
WBC # BLD: 6.32 K/UL — SIGNIFICANT CHANGE UP (ref 3.8–10.5)
WBC # FLD AUTO: 6.32 K/UL — SIGNIFICANT CHANGE UP (ref 3.8–10.5)

## 2022-10-27 PROCEDURE — 99232 SBSQ HOSP IP/OBS MODERATE 35: CPT | Mod: 25

## 2022-10-27 PROCEDURE — 93970 EXTREMITY STUDY: CPT | Mod: 26

## 2022-10-27 PROCEDURE — 99232 SBSQ HOSP IP/OBS MODERATE 35: CPT

## 2022-10-27 RX ORDER — SPIRONOLACTONE 25 MG/1
25 TABLET, FILM COATED ORAL DAILY
Refills: 0 | Status: DISCONTINUED | OUTPATIENT
Start: 2022-10-27 | End: 2022-10-28

## 2022-10-27 RX ORDER — ENOXAPARIN SODIUM 100 MG/ML
40 INJECTION SUBCUTANEOUS EVERY 24 HOURS
Refills: 0 | Status: DISCONTINUED | OUTPATIENT
Start: 2022-10-27 | End: 2022-10-28

## 2022-10-27 RX ADMIN — ATORVASTATIN CALCIUM 40 MILLIGRAM(S): 80 TABLET, FILM COATED ORAL at 21:13

## 2022-10-27 RX ADMIN — SPIRONOLACTONE 25 MILLIGRAM(S): 25 TABLET, FILM COATED ORAL at 10:04

## 2022-10-27 RX ADMIN — Medication 81 MILLIGRAM(S): at 10:04

## 2022-10-27 RX ADMIN — ENOXAPARIN SODIUM 40 MILLIGRAM(S): 100 INJECTION SUBCUTANEOUS at 21:13

## 2022-10-27 RX ADMIN — LISINOPRIL 20 MILLIGRAM(S): 2.5 TABLET ORAL at 09:38

## 2022-10-27 RX ADMIN — TAMSULOSIN HYDROCHLORIDE 0.4 MILLIGRAM(S): 0.4 CAPSULE ORAL at 21:13

## 2022-10-27 RX ADMIN — Medication 40 MILLIGRAM(S): at 05:20

## 2022-10-27 RX ADMIN — GABAPENTIN 600 MILLIGRAM(S): 400 CAPSULE ORAL at 05:20

## 2022-10-27 RX ADMIN — GABAPENTIN 600 MILLIGRAM(S): 400 CAPSULE ORAL at 17:01

## 2022-10-27 RX ADMIN — Medication 40 MILLIGRAM(S): at 13:42

## 2022-10-27 NOTE — DIETITIAN INITIAL EVALUATION ADULT - NS FNS DIET ORDER
Diet, DASH/TLC:   Sodium & Cholesterol Restricted     Special Instructions for Nursing:  Sodium & Cholesterol Restricted (10-26-22 @ 16:06)

## 2022-10-27 NOTE — DIETITIAN INITIAL EVALUATION ADULT - ORAL INTAKE PTA/DIET HISTORY
Pt currently sleeping; unarousable at this time.  Pt with good po intake at meals per RN; consumed nearly 100% at breakfast this morning.  Heart failure nutrition literature left at bedside.  RD to remain available.

## 2022-10-27 NOTE — DIETITIAN INITIAL EVALUATION ADULT - OTHER INFO
Pt with h/o pericardial effusion s/p drain (12/21), HTN, Neuropathy, Colon CA/Kidney CA s/p resection, BPH presents to [facility] ER c/o SOB/BARNES for past month admitted for Acute CHF Exacerbation, HTN Emergency, and NSTEMI.

## 2022-10-27 NOTE — PROGRESS NOTE ADULT - PROBLEM SELECTOR PLAN 4
.  - with NICM  - check DDimer  - if DDimer is elevated, start heparin GTT  - CTA PE protocol and LE dopplers if indicated

## 2022-10-27 NOTE — DIETITIAN INITIAL EVALUATION ADULT - PERTINENT MEDS FT
MEDICATIONS  (STANDING):  aspirin enteric coated 81 milliGRAM(s) Oral daily  atorvastatin 40 milliGRAM(s) Oral at bedtime  carvedilol 6.25 milliGRAM(s) Oral every 12 hours  furosemide   Injectable 40 milliGRAM(s) IV Push two times a day  gabapentin 600 milliGRAM(s) Oral two times a day  influenza  Vaccine (HIGH DOSE) 0.7 milliLiter(s) IntraMuscular once  lisinopril 20 milliGRAM(s) Oral daily  spironolactone 25 milliGRAM(s) Oral daily  tamsulosin 0.4 milliGRAM(s) Oral at bedtime    MEDICATIONS  (PRN):  acetaminophen     Tablet .. 650 milliGRAM(s) Oral every 6 hours PRN Temp greater or equal to 38C (100.4F), Mild Pain (1 - 3)  aluminum hydroxide/magnesium hydroxide/simethicone Suspension 30 milliLiter(s) Oral every 4 hours PRN Dyspepsia  labetalol Injectable 10 milliGRAM(s) IV Push every 4 hours PRN SBP >160  melatonin 3 milliGRAM(s) Oral at bedtime PRN Insomnia  ondansetron Injectable 4 milliGRAM(s) IV Push every 8 hours PRN Nausea and/or Vomiting

## 2022-10-27 NOTE — PROGRESS NOTE ADULT - SUBJECTIVE AND OBJECTIVE BOX
HOSPITALIST PROGRESS NOTE    ONEIDA OCHOA  [id number]  76yMale    Patient is a 76y old  Male who presents with a chief complaint of Non-ST elevation myocardial infarction (NSTEMI)     (27 Oct 2022 10:17)      SUBJECTIVE:   Chart reviewed since last visit.  Patient seen and examined at bedside for CHFrEF, NICM.  Denies any dyspnea, cough, chest pain, palpitations, dizziness      OBJECTIVE:  Vital Signs Last 24 Hrs  T(C): 36.6 (27 Oct 2022 16:30), Max: 36.7 (27 Oct 2022 08:45)  T(F): 97.9 (27 Oct 2022 16:30), Max: 98 (27 Oct 2022 08:45)  HR: 65 (27 Oct 2022 16:30) (45 - 66)  BP: 135/62 (27 Oct 2022 16:30) (105/60 - 135/62)   RR: 18 (27 Oct 2022 16:30) (17 - 18)  SpO2: 98% (27 Oct 2022 16:30) (98% - 99%)    Parameters below as of 27 Oct 2022 16:30  Patient On (Oxygen Delivery Method): nasal cannula  O2 Flow (L/min): 2      PHYSICAL EXAMINATION  General: Elderly male sitting in chair, NAD   HEENT:  EOMI  NECK:  Supple  CVS: regular rate and rhythm S1 S2  RESP:  Fair air entry  GI:  Soft nondistended nontender BS+  : No suprapubic tenderness  MSK:  FROM  CNS:  AAOX3. NO gross focal or global deficit appreciated  INTEG:  warm dry skin  PSYCH:  Fair mood    MONITOR:  CAPILLARY BLOOD GLUCOSE            I&O's Summary    26 Oct 2022 07:01  -  27 Oct 2022 07:00  --------------------------------------------------------  IN: 0 mL / OUT: 600 mL / NET: -600 mL                            14.6   6.32  )-----------( 236      ( 27 Oct 2022 06:45 )             43.9     PTT - ( 26 Oct 2022 12:55 )  PTT:45.6 sec  10-27    143  |  101  |  29.0<H>  ----------------------------<  77  3.8   |  29.0  |  1.25    Ca    8.7      27 Oct 2022 06:45  Mg     2.2     10-27      CARDIAC MARKERS ( 26 Oct 2022 05:44 )  x     / 0.21 ng/mL / x     / x     / x              Culture:    TTE:    RADIOLOGY        MEDICATIONS  (STANDING):  aspirin enteric coated 81 milliGRAM(s) Oral daily  atorvastatin 40 milliGRAM(s) Oral at bedtime  carvedilol 6.25 milliGRAM(s) Oral every 12 hours  furosemide   Injectable 40 milliGRAM(s) IV Push two times a day  gabapentin 600 milliGRAM(s) Oral two times a day  influenza  Vaccine (HIGH DOSE) 0.7 milliLiter(s) IntraMuscular once  lisinopril 20 milliGRAM(s) Oral daily  spironolactone 25 milliGRAM(s) Oral daily  tamsulosin 0.4 milliGRAM(s) Oral at bedtime      MEDICATIONS  (PRN):  acetaminophen     Tablet .. 650 milliGRAM(s) Oral every 6 hours PRN Temp greater or equal to 38C (100.4F), Mild Pain (1 - 3)  aluminum hydroxide/magnesium hydroxide/simethicone Suspension 30 milliLiter(s) Oral every 4 hours PRN Dyspepsia  labetalol Injectable 10 milliGRAM(s) IV Push every 4 hours PRN SBP >160  melatonin 3 milliGRAM(s) Oral at bedtime PRN Insomnia  ondansetron Injectable 4 milliGRAM(s) IV Push every 8 hours PRN Nausea and/or Vomiting

## 2022-10-27 NOTE — PROGRESS NOTE ADULT - SUBJECTIVE AND OBJECTIVE BOX
[facility]                                                         CARDIOLOGY AT [facility]                                                                  [address] [facility], [place] [place] [address]                                                         Telephone: [phone]. Fax:[phone]                                                                             PROGRESS NOTE    Reason for follow up: NSTEMI  Update: s/p R/LHC  CATH SITE: right wrist/RBV benign s/p cath.  No bleeding, no ecchymosis, no hematoma. Extremity Warm to touch, with palpable distal pulses, and brisk capillary refill.        Review of symptoms:   Cardiac:  No chest pain. No dyspnea. No palpitations.  Respiratory: no cough. No dyspnea  Gastrointestinal: No diarrhea. No abdominal pain. No bleeding.   Neuro: No focal neuro complaints.    Vitals:  T(C): 36.7 (10-27-22 @ 08:45), Max: 37 (10-26-22 @ 17:19)  HR: 56 (10-27-22 @ 08:45) (45 - 79)  BP: 131/69 (10-27-22 @ 08:45) (105/60 - 158/57)  RR: 18 (10-27-22 @ 08:45) (16 - 18)  SpO2: 98% (10-27-22 @ 08:45) (94% - 99%)  Wt(kg): --  I&O's Summary    26 Oct 2022 07:01  -  27 Oct 2022 07:00  --------------------------------------------------------  IN: 0 mL / OUT: 600 mL / NET: -600 mL      Weight (kg): 89.811 (10-26 @ 13:45)    PHYSICAL EXAM:  Appearance: Comfortable. No acute distress  HEENT:  Atraumatic. Normocephalic.  Normal oral mucosa  Neurologic: A & O x 3, no gross focal deficits.  Cardiovascular: RRR S1 S2, No murmur, no rubs/gallops. No JVD  Respiratory: Lungs clear, RLL wheeze  Gastrointestinal:  Soft, Non-tender, + BS  Lower Extremities: 2+ Peripheral Pulses, No clubbing, cyanosis, or edema  Psychiatry: Patient is calm. No agitation.   Skin: warm and dry.    CURRENT CARDIAC MEDICATIONS:  carvedilol 6.25 milliGRAM(s) Oral every 12 hours  furosemide   Injectable 40 milliGRAM(s) IV Push two times a day  labetalol Injectable 10 milliGRAM(s) IV Push every 4 hours PRN  lisinopril 20 milliGRAM(s) Oral daily  spironolactone 25 milliGRAM(s) Oral daily  tamsulosin 0.4 milliGRAM(s) Oral at bedtime      CURRENT OTHER MEDICATIONS:  acetaminophen     Tablet .. 650 milliGRAM(s) Oral every 6 hours PRN Temp greater or equal to 38C (100.4F), Mild Pain (1 - 3)  gabapentin 600 milliGRAM(s) Oral two times a day  melatonin 3 milliGRAM(s) Oral at bedtime PRN Insomnia  ondansetron Injectable 4 milliGRAM(s) IV Push every 8 hours PRN Nausea and/or Vomiting  aluminum hydroxide/magnesium hydroxide/simethicone Suspension 30 milliLiter(s) Oral every 4 hours PRN Dyspepsia  atorvastatin 40 milliGRAM(s) Oral at bedtime  aspirin enteric coated 81 milliGRAM(s) Oral daily  influenza  Vaccine (HIGH DOSE) 0.7 milliLiter(s) IntraMuscular once      LABS:	 	  ( 26 Oct 2022 05:44 )  Troponin T  0.21<H>,  CPK  X    , CKMB  X    , BNP X        , ( 25 Oct 2022 03:10 )  Troponin T  0.17<H>,  CPK  91   , CKMB  X    , BNP X        , ( 24 Oct 2022 21:10 )  Troponin T  0.14<H>,  CPK  X    , CKMB  X    , BNP 7540<H>                              14.6   6.32  )-----------( 236      ( 27 Oct 2022 06:45 )             43.9           PT/INR/PTT ( 26 Oct 2022 12:55 )                       :                       :      X            :       45.6                  .        .                   .              .           .       X           .                                       Lipid Profile: Date: 10-25 @ 03:10  Total cholesterol 153; Direct LDL: --; HDL: 49; Triglycerides:68    HgA1c:   TSH: Thyroid Stimulating Hormone, Serum: 2.53 uIU/mL      TELEMETRY: SB 40-50s  ECG:    DIAGNOSTIC TESTING:  [ ] Echocardiogram:   < from: TTE Echo Complete w/ Contrast w/ Doppler (10.25.22 @ 20:20) >    PHYSICIAN INTERPRETATION:  Left Ventricle: Endocardial visualization was enhanced with intravenous   echo contrast. The left ventricular internal cavity size is moderately   increased. Left ventricular wall thickness is normal.  Global LV systolic function was severely decreased. Left ventricular   ejection fraction, by visual estimation, is 20 to 25%. Spectral Doppler   shows pseudonormal pattern of left ventricular myocardial filling (Grade   II diastolic dysfunction).      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 The basal and mid anterolateral wall, basal  anterior segment, and basal inferior segment are akinetic. The basal and   mid  anterior septum, basal andmid inferior septum, basal and mid   inferolateral  wall, mid anterior segment, and mid inferior segment are hypokinetic.    Right Ventricle: The right ventricular size is normal. RV systolic   function is moderately reduced.  Left Atrium: Severely enlarged left atrium.  Pericardium: There is no evidence of pericardial effusion.  Mitral Valve: There is mild mitral annular calcification. Mild to   moderate mitral valve regurgitation is seen.  Tricuspid Valve: The tricuspid valve is normal in structure. Mild   tricuspid regurgitation is visualized. Estimated pulmonary artery   systolic pressure is 61.1 mmHg assuming a right atrial pressure of 3   mmHg, which is consistent with severe pulmonary hypertension.  Aortic Valve: The aortic valve was not well visualized. Moderate aortic   valve regurgitation is seen.  Pulmonic Valve: The pulmonic valve is normal. Mild pulmonic valve   regurgitation.  Aorta: There is dilatation of the aortic root and ascending aorta.  Pulmonary Artery: The pulmonaryartery is not well seen.  Venous: The inferior vena cava was normal sized, with respiratory size   variation greater than 50%.  Additional Comments: A pacer wire is visualized in the right atrium and   right ventricle.      Summary:   1. Technically difficult study.   2. Endocardial visualization was enhanced with intravenous echo contrast.   3. Left ventricular ejection fraction, by visual estimation, is 20 to   25%.   4. Severely decreased global left ventricular systolic function.   5. Multiple left ventricular regional wall motion abnormalities exist.   See wall motion findings.   6. Spectral Doppler shows pseudonormal pattern of left ventricular   myocardial filling (Grade II diastolic dysfunction).   7. Moderately reduced RV systolic function.   8. Mild to moderate mitral valve regurgitation.   9. Mild tricuspid regurgitation.  10. Moderate aortic regurgitation.  11. Dilatation of the aortic root and ascending aorta.  12. Estimated pulmonary artery systolic pressure is 61.1 mmHg assuming a   right atrial pressure of 3 mmHg, which is consistent with severe   pulmonary hypertension.  13. In comparison to TTE 12/20/2021 the VEF is now severely reduced.  14. Results d/w Dr. Gordon.    MD Michael Electronically signed on 10/25/2022 at 10:18:36 PM    *** Final ***    < end of copied text >  [ ]  Catheterization:  [ ] Stress Test:    OTHER:

## 2022-10-27 NOTE — DIETITIAN INITIAL EVALUATION ADULT - PERTINENT LABORATORY DATA
10-27    143  |  101  |  29.0<H>  ----------------------------<  77  3.8   |  29.0  |  1.25    Ca    8.7      27 Oct 2022 06:45  Mg     2.2     10-27    A1C with Estimated Average Glucose Result: 4.7 % (10-25-22 @ 05:00)

## 2022-10-27 NOTE — PROGRESS NOTE ADULT - ASSESSMENT
77 y/o male with PMHx of HTN, colon CA (sp chemo 3 years ago) and Pericardial effusion in December 2021 s/p window and drain, presents to the ED c/o SOB and BARNES for the past month. Pt states last month he had the flu and since then he had had cough with clear sputum, wheezing and congestion. Starting last week that gradually developed into BARNES and SOB at rest. Pt usually is able to sleep laying flat but recently has had to sit up more because he feels he is unable to breath. Pt has not seen cardiologist since last hospitalization. Now for R/LHC. Recent echo with decreased EF and wall motion abnormalities. Pt denies chest pain, back pain, abdominal pain or palpitations.

## 2022-10-28 ENCOUNTER — TRANSCRIPTION ENCOUNTER (OUTPATIENT)
Age: 77
End: 2022-10-28

## 2022-10-28 VITALS
RESPIRATION RATE: 18 BRPM | SYSTOLIC BLOOD PRESSURE: 147 MMHG | HEART RATE: 60 BPM | TEMPERATURE: 98 F | DIASTOLIC BLOOD PRESSURE: 67 MMHG | OXYGEN SATURATION: 96 %

## 2022-10-28 PROBLEM — C18.9 MALIGNANT NEOPLASM OF COLON, UNSPECIFIED: Chronic | Status: ACTIVE | Noted: 2022-10-25

## 2022-10-28 PROBLEM — I31.39 OTHER PERICARDIAL EFFUSION (NONINFLAMMATORY): Chronic | Status: ACTIVE | Noted: 2022-10-25

## 2022-10-28 PROBLEM — C64.9 MALIGNANT NEOPLASM OF UNSPECIFIED KIDNEY, EXCEPT RENAL PELVIS: Chronic | Status: ACTIVE | Noted: 2022-10-25

## 2022-10-28 LAB
ANION GAP SERPL CALC-SCNC: 9 MMOL/L — SIGNIFICANT CHANGE UP (ref 5–17)
BUN SERPL-MCNC: 31.5 MG/DL — HIGH (ref 8–20)
CALCIUM SERPL-MCNC: 8.4 MG/DL — SIGNIFICANT CHANGE UP (ref 8.4–10.5)
CHLORIDE SERPL-SCNC: 103 MMOL/L — SIGNIFICANT CHANGE UP (ref 96–108)
CO2 SERPL-SCNC: 28 MMOL/L — SIGNIFICANT CHANGE UP (ref 22–29)
CREAT SERPL-MCNC: 1.12 MG/DL — SIGNIFICANT CHANGE UP (ref 0.5–1.3)
EGFR: 68 ML/MIN/1.73M2 — SIGNIFICANT CHANGE UP
GLUCOSE BLDC GLUCOMTR-MCNC: 77 MG/DL — SIGNIFICANT CHANGE UP (ref 70–99)
GLUCOSE SERPL-MCNC: 97 MG/DL — SIGNIFICANT CHANGE UP (ref 70–99)
POTASSIUM SERPL-MCNC: 3.9 MMOL/L — SIGNIFICANT CHANGE UP (ref 3.5–5.3)
POTASSIUM SERPL-SCNC: 3.9 MMOL/L — SIGNIFICANT CHANGE UP (ref 3.5–5.3)
SODIUM SERPL-SCNC: 140 MMOL/L — SIGNIFICANT CHANGE UP (ref 135–145)

## 2022-10-28 PROCEDURE — 99232 SBSQ HOSP IP/OBS MODERATE 35: CPT | Mod: 25

## 2022-10-28 PROCEDURE — 71275 CT ANGIOGRAPHY CHEST: CPT | Mod: 26

## 2022-10-28 PROCEDURE — 99239 HOSP IP/OBS DSCHRG MGMT >30: CPT

## 2022-10-28 RX ORDER — LISINOPRIL 2.5 MG/1
1 TABLET ORAL
Qty: 30 | Refills: 0
Start: 2022-10-28 | End: 2022-11-26

## 2022-10-28 RX ORDER — FUROSEMIDE 40 MG
40 TABLET ORAL DAILY
Refills: 0 | Status: DISCONTINUED | OUTPATIENT
Start: 2022-10-28 | End: 2022-10-28

## 2022-10-28 RX ORDER — SPIRONOLACTONE 25 MG/1
1 TABLET, FILM COATED ORAL
Qty: 30 | Refills: 0
Start: 2022-10-28 | End: 2022-11-26

## 2022-10-28 RX ORDER — FUROSEMIDE 40 MG
1 TABLET ORAL
Qty: 30 | Refills: 0
Start: 2022-10-28 | End: 2022-11-26

## 2022-10-28 RX ORDER — ATORVASTATIN CALCIUM 80 MG/1
1 TABLET, FILM COATED ORAL
Qty: 30 | Refills: 0
Start: 2022-10-28 | End: 2022-11-26

## 2022-10-28 RX ORDER — ASPIRIN/CALCIUM CARB/MAGNESIUM 324 MG
1 TABLET ORAL
Qty: 30 | Refills: 0
Start: 2022-10-28 | End: 2022-11-26

## 2022-10-28 RX ADMIN — GABAPENTIN 600 MILLIGRAM(S): 400 CAPSULE ORAL at 16:43

## 2022-10-28 RX ADMIN — LISINOPRIL 20 MILLIGRAM(S): 2.5 TABLET ORAL at 08:18

## 2022-10-28 RX ADMIN — Medication 40 MILLIGRAM(S): at 16:38

## 2022-10-28 RX ADMIN — Medication 81 MILLIGRAM(S): at 08:19

## 2022-10-28 RX ADMIN — Medication 40 MILLIGRAM(S): at 05:31

## 2022-10-28 RX ADMIN — GABAPENTIN 600 MILLIGRAM(S): 400 CAPSULE ORAL at 05:31

## 2022-10-28 RX ADMIN — SPIRONOLACTONE 25 MILLIGRAM(S): 25 TABLET, FILM COATED ORAL at 05:31

## 2022-10-28 RX ADMIN — CARVEDILOL PHOSPHATE 6.25 MILLIGRAM(S): 80 CAPSULE, EXTENDED RELEASE ORAL at 16:38

## 2022-10-28 NOTE — DISCHARGE NOTE PROVIDER - ATTENDING DISCHARGE PHYSICAL EXAMINATION:
General: Elderly male sitting in chair, NAD   HEENT:  EOMI  NECK:  Supple  CVS: regular rate and rhythm S1 S2  RESP:  CTAB, no rhonchi or crackles  CNS:  AAOX3. NO gross focal or global deficit appreciated  INTEG:  warm dry skin  PSYCH:  Fair mood

## 2022-10-28 NOTE — PROGRESS NOTE ADULT - PROBLEM SELECTOR PLAN 4
.  - with NICM  - DDimer negative  - LE dopplers negative   - CTA PE protocol pending      No further inpatient cardiac work up at this time.  Will sign off.  Please reconsult if needed.

## 2022-10-28 NOTE — PROGRESS NOTE ADULT - PROBLEM SELECTOR PROBLEM 2
HTN (hypertension)
NICM (nonischemic cardiomyopathy)
SOB (shortness of breath)
SOB (shortness of breath)

## 2022-10-28 NOTE — DISCHARGE NOTE PROVIDER - NSDCFUADDINST_GEN_ALL_CORE_FT
Right Wrist:  Restricted use with no heavy lifting of affected arm for 48 hours.  No submerging the arm in water for 48 hours.  You may start showering today.  Call your doctor for any bleeding, swelling, loss of sensation in the hand or fingers, or fingers turning blue.  If heavy bleeding or large lumps form, hold pressure at the spot and come to the Emergency Room.    Right Groin:  No heavy lifting, driving, sex, tub baths, swimming, or any activity that submerges the lower half of the body in water for 48 hours.  Limited walking and stairs for 48 hours.    Change the bandaid after 24 hours and every 24 hours after that.  Keep the puncture site dry and covered with a bandaid until a scab forms.    Observe the site frequently.  If bleeding or a large lump (the size of a golf ball or bigger) occurs lie flat, apply continuous direct pressure just above the puncture site for at least 10 minutes, and notify your physician immediately.  If the bleeding cannot be controlled, call 911 immediately for assistance.  Notify your physician of pain, swelling or any drainage.    Notify your physician immediately if coldness, numbness, discoloration or pain in your foot occurs.

## 2022-10-28 NOTE — PROGRESS NOTE ADULT - PROBLEM SELECTOR PLAN 1
.  - EKG shows  NSR with ST abnormalities and new TWI in anterior leads, also with elevated troponin  - TTE LVE 20-25%, severe pHTN,   - s/p LHC revealing NICM, RHC normal pressures, LVEDP 22 mildly elevated   - GDMT       Beta Blocker: coreg 6.25 PO q12h       RAAS Inhibitor: lisinopril 20mg PO daily       MRA: start spironolactone 25mg PO daily       Diuretic: lasix 40mg IVP BID, will reassess for daily tomorrow, patient is close to euvolemic       SGLT2i: n/a  - Strict I&O's  - Daily standing weights (if able).  - Keep K > 4, Mg > 2.    - Monitor renal function with ongoing diuresis.  - continue ASA, statin
.  - EKG shows  NSR with ST abnormalities and new TWI in anterior leads, also with elevated troponin  - TTE LVE 20-25%, severe pHTN,   - s/p LHC revealing NICM, RHC normal pressures, LVEDP 22 mildly elevated   - GDMT       Beta Blocker: coreg 6.25 PO q12h       RAAS Inhibitor: lisinopril 20mg PO daily       MRA: spironolactone 25mg PO daily       Diuretic: lasix 40mg PO daily       SGLT2i: n/a  - Strict I&O's  - Daily standing weights (if able).  - Keep K > 4, Mg > 2.    - Monitor renal function with ongoing diuresis.  - continue ASA, statin.
Likely going to be Non ischemic cardiomyopathy  But with trops trending up and drop in EF Left heart cath is reasonable  will also refer for right heart cath  Discussed plan with Dr. Burch  c/w heparin ASA    c.w.  aspirin enteric coated 81, heparin add statin

## 2022-10-28 NOTE — DISCHARGE NOTE PROVIDER - NSDCMRMEDTOKEN_GEN_ALL_CORE_FT
albuterol 90 mcg/inh inhalation aerosol: 2 puff(s) inhaled every 6 hours   aspirin 81 mg oral delayed release tablet: 1 tab(s) orally once a day  atorvastatin 40 mg oral tablet: 1 tab(s) orally once a day (at bedtime)  carvedilol 6.25 mg oral tablet: 1 tab(s) orally 2 times a day  furosemide 40 mg oral tablet: 1 tab(s) orally once a day  gabapentin 600 mg oral tablet: 1 tab(s) orally 2 times a day  lisinopril 20 mg oral tablet: 1 tab(s) orally once a day  spironolactone 25 mg oral tablet: 1 tab(s) orally once a day  tamsulosin 0.4 mg oral capsule: 1 cap(s) orally once a day (at bedtime)

## 2022-10-28 NOTE — PROGRESS NOTE ADULT - NS ATTEND AMEND GEN_ALL_CORE FT
Patient seen and examined by me.  I have discussed my recommendation with the PA which are outlined above.  Will follow.  MEDICATIONS  (STANDING):  aspirin enteric coated 81 milliGRAM(s) Oral daily  atorvastatin 40 milliGRAM(s) Oral at bedtime  carvedilol 6.25 milliGRAM(s) Oral every 12 hours  furosemide   Injectable 40 milliGRAM(s) IV Push two times a day  gabapentin 600 milliGRAM(s) Oral two times a day  hydrALAZINE 50 milliGRAM(s) Oral three times a day  influenza  Vaccine (HIGH DOSE) 0.7 milliLiter(s) IntraMuscular once  lisinopril 10 milliGRAM(s) Oral daily  tamsulosin 0.4 milliGRAM(s) Oral at bedtime
Patient seen and examined by me.  I have discussed my recommendation with the PA which are outlined above.  Will follow.    MEDICATIONS  (STANDING):  aspirin enteric coated 81 milliGRAM(s) Oral daily  atorvastatin 40 milliGRAM(s) Oral at bedtime  carvedilol 6.25 milliGRAM(s) Oral every 12 hours  furosemide   Injectable 40 milliGRAM(s) IV Push two times a day  gabapentin 600 milliGRAM(s) Oral two times a day  influenza  Vaccine (HIGH DOSE) 0.7 milliLiter(s) IntraMuscular once  lisinopril 20 milliGRAM(s) Oral daily  spironolactone 25 milliGRAM(s) Oral daily  tamsulosin 0.4 milliGRAM(s) Oral at bedtime
Patient seen and examined by me.  I have discussed my recommendation with the PA which are outlined above.  Will sign off

## 2022-10-28 NOTE — DISCHARGE NOTE PROVIDER - CARE PROVIDER_API CALL
Farideh Burch)  Cardiology; Cardiovascular Disease; Internal Medicine  [address] [place] [address]  Phone: [phone]  Fax: [phone]  Follow Up Time:

## 2022-10-28 NOTE — DISCHARGE NOTE PROVIDER - NSDCCPCAREPLAN_GEN_ALL_CORE_FT
PRINCIPAL DISCHARGE DIAGNOSIS  Diagnosis: NSTEMI (non-ST elevation myocardial infarction)  Assessment and Plan of Treatment: Your echocardiagram  shows depressed Ejection freaction.   Your Cardiac catheterization is  without any stenosis of your coronary arteries.  Please continue your current medications and follow up with your Cardiologist for further management.  Follow a heart healthy diet:  Choose lean meats and poultry without skin and prepare them without added saturated and trans fat.  Eat fish at least twice a week. Recent research shows that eating oily fish containing omega-3 fatty acids (for example, salmon, trout and herring) may help lower your risk of death from coronary artery disease.  Select fat-free, 1 percent fat and low-fat dairy products.  Cut back on foods containing partially hydrogenated vegetable oils to reduce trans fat in your diet.   To lower cholesterol, reduce saturated fat to no more than 5 to 6 percent of total calories. For someone eating 2,000 calories a day, that’s about 13 grams of saturated fat.  Cut back on beverages and foods with added sugars.  Choose and prepare foods with little or no salt. To lower blood pressure, aim to eat no more than 2,400 milligrams of sodium per day. Reducing daily intake to 1,500 mg is desirable because it can lower blood pressure even further.  If you drink alcohol, drink in moderation. That means one drink per day if you’re a woman and two drinks  per day if you’re a man.  Follow the American Heart Association recommendations when you eat out, and keep an eye on your portion sizes.        SECONDARY DISCHARGE DIAGNOSES  Diagnosis: Acute combined systolic and diastolic congestive heart failure  Assessment and Plan of Treatment: Please continue your medications as prescribed.  Weigh yourself daily and call your Cardiologist if you gain more than 1-2 lbs in one day or 5 lbs in one week.   Low sodium diet    Diagnosis: CHF exacerbation  Assessment and Plan of Treatment:     Diagnosis: NICM (nonischemic cardiomyopathy)  Assessment and Plan of Treatment: Plan as above    Diagnosis: Accelerated hypertension  Assessment and Plan of Treatment: Please maintain your current medications  Follow up with your PMD and Cardiologist for further management and monitoring.

## 2022-10-28 NOTE — PROGRESS NOTE ADULT - PROVIDER SPECIALTY LIST ADULT
Cardiology
Hospitalist
Cardiology
Hospitalist
Intervent Cardiology
Cardiology
Hospitalist
Cardiology

## 2022-10-28 NOTE — DISCHARGE NOTE PROVIDER - NSDCFUSCHEDAPPT_GEN_ALL_CORE_FT
Layton Avilez  [facility] Physician [facility]  CARDIOLOGY [address]  Scheduled Appointment: 11/10/2022

## 2022-10-28 NOTE — DISCHARGE NOTE PROVIDER - HOSPITAL COURSE
76 Slovenian-speaking M with PMHX Pericardial Effusion s/p drain (12/21), HTN, Neuropathy, Colon CA/Kidney CA s/p resection, BPH presents to [facility] ER c/o SOB/BARNES for past month since having flu like illness at that time. +Orthopnea. +PND. Denies CP. pBNP and Troponin elevated on labs. BP uncontrolled 203/91 on arrival to ER. CXR volume overloaded. Has not followed up with Cardiologist since discharge from hospital last year s/p pericardial effusion. Admitted for Acute CHF Exacerbation, HTN Emergency, and NSTEMI. Seen in consultation by Cardiology. EF was 50% dropped to 20% on TTE. S/P Right and left heart catheterization on 10/26.  Suspect NSTEMI type 2 spill from uncontrolled HTN and CHF    EF shows depressed EF without effusion, Cardiac catheterization without any stenosis. LE doppler (-), D-Dimer equivocal; CTA performed negative for PE. Diuresed with IV lasix, changed to oral for discharge. Medication compliance and medical follow up emphasized. The patient no longer requires supplemental oxygen. The patient is medically stable for discharge.   Vital Signs Last 24 Hrs  T(C): 36.7 (28 Oct 2022 08:21), Max: 36.8 (27 Oct 2022 20:50)  T(F): 98.1 (28 Oct 2022 08:21), Max: 98.2 (27 Oct 2022 20:50)  HR: 63 (28 Oct 2022 08:21) (59 - 76)  BP: 160/72 (28 Oct 2022 08:21) (130/65 - 160/72)  BP(mean): --  RR: 18 (28 Oct 2022 08:21) (18 - 18)  SpO2: 96% (28 Oct 2022 08:21) (96% - 98%)    Parameters below as of 28 Oct 2022 08:21  Patient On (Oxygen Delivery Method): room air                            14.6   6.32  )-----------( 236      ( 27 Oct 2022 06:45 )             43.9     28 Oct 2022 05:44    140    |  103    |  31.5   ----------------------------<  97     3.9     |  28.0   |  1.12     Ca    8.4        28 Oct 2022 05:44  Mg     2.2       27 Oct 2022 06:45      PTT - ( 26 Oct 2022 12:55 )  PTT:45.6 sec  CAPILLARY BLOOD GLUCOSE      POCT Blood Glucose.: 77 mg/dL (27 Oct 2022 23:58)    PHYSICAL EXAMINATION  General: Awake, alert, NAD  HEENT:  EOMI  NECK:  Supple  CVS: regular rate and rhythm S1 S2  RESP:  Fair air entry  GI:  Soft nondistended nontender BS+  : No suprapubic tenderness  MSK:  FROM  CNS:  NO gross focal or global deficit appreciated  INTEG:  warm dry skin  PSYCH: Calm and cooperative           76 Turkish-speaking M with PMHX Pericardial Effusion s/p drain (12/21), HTN, Neuropathy, Colon CA/Kidney CA s/p resection, BPH presents to [facility] ER c/o SOB/BARNES for past month since having flu like illness at that time. +Orthopnea. +PND. Denies CP. pBNP and Troponin elevated on labs. BP uncontrolled 203/91 on arrival to ER. CXR volume overloaded. Has not followed up with Cardiologist since discharge from hospital last year s/p pericardial effusion. Admitted for Acute CHF Exacerbation, HTN Emergency, and NSTEMI. Seen in consultation by Cardiology. EF was 50% dropped to 20% on TTE. S/P Right and left heart catheterization on 10/26.  Suspect NSTEMI type 2 spill from uncontrolled HTN and CHF    EF shows depressed EF without effusion, Cardiac catheterization without any stenosis. LE doppler (-), D-Dimer equivocal; CTA performed negative for PE. Diuresed with IV lasix, changed to oral for discharge. Medication compliance and medical follow up emphasized. The patient no longer requires supplemental oxygen. The patient is medically stable for discharge.     Vital Signs Last 24 Hrs  T(C): 36.7 (28 Oct 2022 08:21), Max: 36.8 (27 Oct 2022 20:50)  T(F): 98.1 (28 Oct 2022 08:21), Max: 98.2 (27 Oct 2022 20:50)  HR: 63 (28 Oct 2022 08:21) (59 - 76)  BP: 160/72 (28 Oct 2022 08:21) (130/65 - 160/72)  BP(mean): --  RR: 18 (28 Oct 2022 08:21) (18 - 18)  SpO2: 96% (28 Oct 2022 08:21) (96% - 98%)    Parameters below as of 28 Oct 2022 08:21  Patient On (Oxygen Delivery Method): room air                            14.6   6.32  )-----------( 236      ( 27 Oct 2022 06:45 )             43.9     28 Oct 2022 05:44    140    |  103    |  31.5   ----------------------------<  97     3.9     |  28.0   |  1.12     Ca    8.4        28 Oct 2022 05:44  Mg     2.2       27 Oct 2022 06:45      PTT - ( 26 Oct 2022 12:55 )  PTT:45.6 sec  CAPILLARY BLOOD GLUCOSE      POCT Blood Glucose.: 77 mg/dL (27 Oct 2022 23:58)    PHYSICAL EXAMINATION  General: Awake, alert, NAD  HEENT:  EOMI  NECK:  Supple  CVS: regular rate and rhythm S1 S2  RESP:  Fair air entry  GI:  Soft nondistended nontender BS+  : No suprapubic tenderness  MSK:  FROM  CNS:  NO gross focal or global deficit appreciated  INTEG:  warm dry skin  PSYCH: Calm and cooperative

## 2022-10-28 NOTE — DISCHARGE NOTE NURSING/CASE MANAGEMENT/SOCIAL WORK - PATIENT PORTAL LINK FT
You can access the FollowMyHealth Patient Portal offered by [facility] by registering at the following website: [email or web address]. By joining [employer]’s FollowMyHealth portal, you will also be able to view your health information using other applications (apps) compatible with our system.

## 2022-10-28 NOTE — PROGRESS NOTE ADULT - SUBJECTIVE AND OBJECTIVE BOX
[facility]                                                         CARDIOLOGY AT [facility]                                                                  [address] [address]                                                         Telephone: [phone]. Fax:[phone]                                                                             PROGRESS NOTE    Reason for follow up:  NSTEMI  Update: cath sites remain stable today. pending CTA due to RV dysfunction, DDIMER and leg dopplers negative      Review of symptoms:   Cardiac:  No chest pain. No dyspnea. No palpitations.  Respiratory: no cough. No dyspnea  Gastrointestinal: No diarrhea. No abdominal pain. No bleeding.   Neuro: No focal neuro complaints.    Vitals:  T(C): 36.7 (10-28-22 @ 08:21), Max: 36.8 (10-27-22 @ 20:50)  HR: 63 (10-28-22 @ 08:21) (59 - 76)  BP: 160/72 (10-28-22 @ 08:21) (130/65 - 160/72)  RR: 18 (10-28-22 @ 08:21) (18 - 18)  SpO2: 96% (10-28-22 @ 08:21) (96% - 98%)  Wt(kg): --  I&O's Summary    27 Oct 2022 07:01  -  28 Oct 2022 07:00  --------------------------------------------------------  IN: 0 mL / OUT: 400 mL / NET: -400 mL    28 Oct 2022 07:01  -  28 Oct 2022 09:42  --------------------------------------------------------  IN: 0 mL / OUT: 800 mL / NET: -800 mL      Weight (kg): 89.811 (10-26 @ 13:45)    PHYSICAL EXAM:  Appearance: Comfortable. No acute distress  HEENT:  Atraumatic. Normocephalic.  Normal oral mucosa  Neurologic: A & O x 3, no gross focal deficits.  Cardiovascular: RRR S1 S2, No murmur, no rubs/gallops. No JVD  Respiratory: Lungs clear to auscultation, unlabored   Gastrointestinal:  Soft, Non-tender, + BS  Lower Extremities: 2+ Peripheral Pulses, No clubbing, cyanosis, or edema  Psychiatry: Patient is calm. No agitation.   Skin: warm and dry.    CURRENT CARDIAC MEDICATIONS:  carvedilol 6.25 milliGRAM(s) Oral every 12 hours  furosemide   Injectable 40 milliGRAM(s) IV Push two times a day  labetalol Injectable 10 milliGRAM(s) IV Push every 4 hours PRN  lisinopril 20 milliGRAM(s) Oral daily  spironolactone 25 milliGRAM(s) Oral daily  tamsulosin 0.4 milliGRAM(s) Oral at bedtime      CURRENT OTHER MEDICATIONS:  acetaminophen     Tablet .. 650 milliGRAM(s) Oral every 6 hours PRN Temp greater or equal to 38C (100.4F), Mild Pain (1 - 3)  gabapentin 600 milliGRAM(s) Oral two times a day  melatonin 3 milliGRAM(s) Oral at bedtime PRN Insomnia  ondansetron Injectable 4 milliGRAM(s) IV Push every 8 hours PRN Nausea and/or Vomiting  aluminum hydroxide/magnesium hydroxide/simethicone Suspension 30 milliLiter(s) Oral every 4 hours PRN Dyspepsia  atorvastatin 40 milliGRAM(s) Oral at bedtime  aspirin enteric coated 81 milliGRAM(s) Oral daily  enoxaparin Injectable 40 milliGRAM(s) SubCutaneous every 24 hours  influenza  Vaccine (HIGH DOSE) 0.7 milliLiter(s) IntraMuscular once      LABS:	 	  ( 26 Oct 2022 05:44 )  Troponin T  0.21<H>,  CPK  X    , CKMB  X    , BNP X        , ( 25 Oct 2022 03:10 )  Troponin T  0.17<H>,  CPK  91   , CKMB  X    , BNP X        , ( 24 Oct 2022 21:10 )  Troponin T  0.14<H>,  CPK  X    , CKMB  X    , BNP 7540<H>                              14.6   6.32  )-----------( 236      ( 27 Oct 2022 06:45 )             43.9     10-28    140  |  103  |  31.5<H>  ----------------------------<  97  3.9   |  28.0  |  1.12    Ca    8.4      28 Oct 2022 05:44  Mg     2.2     10-27      PT/INR/PTT ( 26 Oct 2022 12:55 )                       :                       :      X            :       45.6                  .        .                   .              .           .       X           .                                       Lipid Profile: Date: 10-25 @ 03:10  Total cholesterol 153; Direct LDL: --; HDL: 49; Triglycerides:68    HgA1c:   TSH: Thyroid Stimulating Hormone, Serum: 2.53 uIU/mL      TELEMETRY: SB 40-50s  ECG:    DIAGNOSTIC TESTING:  [ ] Echocardiogram: < from: TTE Echo Complete w/ Contrast w/ Doppler (10.25.22 @ 20:20) >    PHYSICIAN INTERPRETATION:  Left Ventricle: Endocardial visualization was enhanced with intravenous   echo contrast. The left ventricular internal cavity size is moderately   increased. Left ventricular wall thickness is normal.  Global LV systolic function was severely decreased. Left ventricular   ejection fraction, by visual estimation, is 20 to 25%. Spectral Doppler   shows pseudonormal pattern of left ventricular myocardial filling (Grade   II diastolic dysfunction).      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 The basal and mid anterolateral wall, basal  anterior segment, and basal inferior segment are akinetic. The basal and   mid  anterior septum, basal andmid inferior septum, basal and mid   inferolateral  wall, mid anterior segment, and mid inferior segment are hypokinetic.    Right Ventricle: The right ventricular size is normal. RV systolic   function is moderately reduced.  Left Atrium: Severely enlarged left atrium.  Pericardium: There is no evidence of pericardial effusion.  Mitral Valve: There is mild mitral annular calcification. Mild to   moderate mitral valve regurgitation is seen.  Tricuspid Valve: The tricuspid valve is normal in structure. Mild   tricuspid regurgitation is visualized. Estimated pulmonary artery   systolic pressure is 61.1 mmHg assuming a right atrial pressure of 3   mmHg, which is consistent with severe pulmonary hypertension.  Aortic Valve: The aortic valve was not well visualized. Moderate aortic   valve regurgitation is seen.  Pulmonic Valve: The pulmonic valve is normal. Mild pulmonic valve   regurgitation.  Aorta: There is dilatation of the aortic root and ascending aorta.  Pulmonary Artery: The pulmonaryartery is not well seen.  Venous: The inferior vena cava was normal sized, with respiratory size   variation greater than 50%.  Additional Comments: A pacer wire is visualized in the right atrium and   right ventricle.      Summary:   1. Technically difficult study.   2. Endocardial visualization was enhanced with intravenous echo contrast.   3. Left ventricular ejection fraction, by visual estimation, is 20 to   25%.   4. Severely decreased global left ventricular systolic function.   5. Multiple left ventricular regional wall motion abnormalities exist.   See wall motion findings.   6. Spectral Doppler shows pseudonormal pattern of left ventricular   myocardial filling (Grade II diastolic dysfunction).   7. Moderately reduced RV systolic function.   8. Mild to moderate mitral valve regurgitation.   9. Mild tricuspid regurgitation.  10. Moderate aortic regurgitation.  11. Dilatation of the aortic root and ascending aorta.  12. Estimated pulmonary artery systolic pressure is 61.1 mmHg assuming a   right atrial pressure of 3 mmHg, which is consistent with severe   pulmonary hypertension.  13. In comparison to TTE 12/20/2021 the VEF is now severely reduced.  14. Results d/w Dr. Gordon.    MD Michael Electronically signed on 10/25/2022 at 10:18:36 PM      < end of copied text >    [ ]  Catheterization:  Diagnostic Conclusions:  Luminal coronary irregularities. Overall NICM. Continue GDMT for CHF.  sp@c3  Acute complication: No complications  Hemodynamic: Hemodynamic Impressions  General Impressions: Hemodynamic assessment demonstrates  [ ] Stress Test:    OTHER:

## 2022-10-28 NOTE — PROGRESS NOTE ADULT - PROBLEM SELECTOR PROBLEM 1
Other cardiomyopathy
Acute combined systolic and diastolic congestive heart failure

## 2022-10-28 NOTE — PROGRESS NOTE ADULT - ASSESSMENT
75 y/o male with PMHx of HTN, colon CA (sp chemo 3 years ago) and Pericardial effusion in December 2021 s/p window and drain, presents to the ED c/o SOB and BARNES for the past month. Pt states last month he had the flu and since then he had had cough with clear sputum, wheezing and congestion. Starting last week that gradually developed into BARNES and SOB at rest. Pt usually is able to sleep laying flat but recently has had to sit up more because he feels he is unable to breath. Pt has not seen cardiologist since last hospitalization. Now for R/LHC. Recent echo with decreased EF and wall motion abnormalities. Pt denies chest pain, back pain, abdominal pain or palpitations.

## 2022-10-28 NOTE — DISCHARGE NOTE NURSING/CASE MANAGEMENT/SOCIAL WORK - NSDCFUADDAPPT_GEN_ALL_CORE_FT
ANA milton/ Dr. Avilez on 11/10 at 2:45. If you are unable to attend your pre-scheduled appointment,   please contact the office directly at [phone] to reschedule. [address]  [facility] [address] [facility]  [phone] ON 11/1/2022 AT 10 AM  [facility] w/ Dr. Avilez on 11/10 at 2:45. If you are unable to attend your pre-scheduled appointment,   please contact the office directly at [phone] to reschedule.  [facility] [place]- [facility] TO BED, DAUGHTER WANTS TO KEEP OWN RX

## 2022-10-28 NOTE — PROGRESS NOTE ADULT - REASON FOR ADMISSION
Acute CHF Exacerbation, NSTEMI, HTN Emergency

## 2022-11-09 PROCEDURE — 86850 RBC ANTIBODY SCREEN: CPT

## 2022-11-09 PROCEDURE — 84443 ASSAY THYROID STIM HORMONE: CPT

## 2022-11-09 PROCEDURE — 36415 COLL VENOUS BLD VENIPUNCTURE: CPT

## 2022-11-09 PROCEDURE — 83880 ASSAY OF NATRIURETIC PEPTIDE: CPT

## 2022-11-09 PROCEDURE — 82962 GLUCOSE BLOOD TEST: CPT

## 2022-11-09 PROCEDURE — C8929: CPT

## 2022-11-09 PROCEDURE — 96374 THER/PROPH/DIAG INJ IV PUSH: CPT

## 2022-11-09 PROCEDURE — 85730 THROMBOPLASTIN TIME PARTIAL: CPT

## 2022-11-09 PROCEDURE — 83735 ASSAY OF MAGNESIUM: CPT

## 2022-11-09 PROCEDURE — 93460 R&L HRT ART/VENTRICLE ANGIO: CPT

## 2022-11-09 PROCEDURE — 85610 PROTHROMBIN TIME: CPT

## 2022-11-09 PROCEDURE — 85379 FIBRIN DEGRADATION QUANT: CPT

## 2022-11-09 PROCEDURE — U0005: CPT

## 2022-11-09 PROCEDURE — 80061 LIPID PANEL: CPT

## 2022-11-09 PROCEDURE — 84484 ASSAY OF TROPONIN QUANT: CPT

## 2022-11-09 PROCEDURE — U0003: CPT

## 2022-11-09 PROCEDURE — 84100 ASSAY OF PHOSPHORUS: CPT

## 2022-11-09 PROCEDURE — C1887: CPT

## 2022-11-09 PROCEDURE — 80053 COMPREHEN METABOLIC PANEL: CPT

## 2022-11-09 PROCEDURE — 99285 EMERGENCY DEPT VISIT HI MDM: CPT

## 2022-11-09 PROCEDURE — 80048 BASIC METABOLIC PNL TOTAL CA: CPT

## 2022-11-09 PROCEDURE — 86900 BLOOD TYPING SEROLOGIC ABO: CPT

## 2022-11-09 PROCEDURE — 82550 ASSAY OF CK (CPK): CPT

## 2022-11-09 PROCEDURE — 71275 CT ANGIOGRAPHY CHEST: CPT

## 2022-11-09 PROCEDURE — 71045 X-RAY EXAM CHEST 1 VIEW: CPT

## 2022-11-09 PROCEDURE — 93970 EXTREMITY STUDY: CPT

## 2022-11-09 PROCEDURE — C1894: CPT

## 2022-11-09 PROCEDURE — 85027 COMPLETE CBC AUTOMATED: CPT

## 2022-11-09 PROCEDURE — 86901 BLOOD TYPING SEROLOGIC RH(D): CPT

## 2022-11-09 PROCEDURE — 85025 COMPLETE CBC W/AUTO DIFF WBC: CPT

## 2022-11-09 PROCEDURE — 93005 ELECTROCARDIOGRAM TRACING: CPT

## 2022-11-09 PROCEDURE — C1769: CPT

## 2022-11-09 PROCEDURE — 83036 HEMOGLOBIN GLYCOSYLATED A1C: CPT

## 2022-11-10 ENCOUNTER — APPOINTMENT (OUTPATIENT)
Dept: CARDIOLOGY | Facility: CLINIC | Age: 77
End: 2022-11-10

## 2022-11-10 ENCOUNTER — NON-APPOINTMENT (OUTPATIENT)
Age: 77
End: 2022-11-10

## 2022-11-10 VITALS
HEIGHT: 71 IN | SYSTOLIC BLOOD PRESSURE: 160 MMHG | DIASTOLIC BLOOD PRESSURE: 62 MMHG | TEMPERATURE: 98.2 F | HEART RATE: 70 BPM | WEIGHT: 195 LBS | BODY MASS INDEX: 27.3 KG/M2 | OXYGEN SATURATION: 100 %

## 2022-11-10 VITALS — DIASTOLIC BLOOD PRESSURE: 60 MMHG | SYSTOLIC BLOOD PRESSURE: 160 MMHG

## 2022-11-10 DIAGNOSIS — I50.40 UNSPECIFIED COMBINED SYSTOLIC (CONGESTIVE) AND DIASTOLIC (CONGESTIVE) HEART FAILURE: ICD-10-CM

## 2022-11-10 DIAGNOSIS — C18.9 MALIGNANT NEOPLASM OF COLON, UNSPECIFIED: ICD-10-CM

## 2022-11-10 DIAGNOSIS — Z83.438 FAMILY HISTORY OF OTHER DISORDER OF LIPOPROTEIN METABOLISM AND OTHER LIPIDEMIA: ICD-10-CM

## 2022-11-10 DIAGNOSIS — Z78.9 OTHER SPECIFIED HEALTH STATUS: ICD-10-CM

## 2022-11-10 PROCEDURE — 93000 ELECTROCARDIOGRAM COMPLETE: CPT

## 2022-11-10 PROCEDURE — 99215 OFFICE O/P EST HI 40 MIN: CPT

## 2022-11-10 RX ORDER — GABAPENTIN 600 MG/1
600 TABLET, COATED ORAL TWICE DAILY
Refills: 0 | Status: ACTIVE | COMMUNITY
Start: 2022-11-10

## 2022-11-10 NOTE — HISTORY OF PRESENT ILLNESS
[FreeTextEntry1] : This is a very pleasant Mexican speaking 76 year old man with a history of hypertension, colon CA s/p chemotherapy three years ago, who presented to the [facility] ED with shortness of breath and wheezing, found to have acute systolic heart failure with EF of 20-25%. Troponins were elevated, with concern for NSTEMI. Cardiac cath showed no angiographic evidence of disease and CT-A did not demonstrate acute PE. \par \par He was discharged on a regimen for heart failure including Coreg 6.25 mg BID, aldactone 25 mg daily, lisinopril 20 mg daily, furosemide 40 mg daily. \par \par He presents with his grandson today. Feels well and has no complaints. Denies chest pains or shortness of breath. Takes meds as prescribed. He is from [place] but will be living here with his daughter in the interim. He has been active, and grandson corroborates this. \par \par Otherwise, denies orthopnea, paroxysmal nocturnal dyspnea, lower extremity edema, unexplained weight gain or dyspnea on exertion.\par \par BP today still elevated with

## 2022-11-10 NOTE — DISCUSSION/SUMMARY
[FreeTextEntry1] : This is a very pleasant 77 yo man with a history of colon CA and longstanding HTN found to have new heart failure with severely reduced EF to 20-25%\par \par #Acute on chronic combined systolic and diastolic heart failure: NYHA I, Profile A, EF 20-25%\par Etiology: NICM, likely 2/2 HTN\par Beta blocker: increase coreg to 12.5 mg BID today\par ACE/ARNI: Continue lisinopril 20 mg daily. Can switch to Entresto in two weeks if otherwise tolerated\par MRA: continue aldactone 25 mg daily\par SGLT2 inhibitor:start empagliflozin 10 mg daily today\par Diuretic: continue maintenance lasix 40 mg daily\par Repeat echocardiogram in three months to determine need for ICD\par 2 gram sodium diet\par call clinic if gain >3 lb overnight or  5 lb in one week\par return in 2 weeks for uptitration of meds\par \par #HTN: poorly controlled\par - see above plan for antihypertensives. Can continue to increase coreg and switch to entresto as needed\par \par #NSTEMI: likely 2/2 CHF. No evidence of atherosclerosis. Can continue aspirin. [EKG obtained to assist in diagnosis and management of assessed problem(s)] : EKG obtained to assist in diagnosis and management of assessed problem(s)

## 2022-11-10 NOTE — CARDIOLOGY SUMMARY
[de-identified] : Today 11/2022: NSR, LVH with strain pattern [de-identified] : 11/2022: LVEF 20-25% with global hypoK [de-identified] : 10/2022: no evidence of disease Niacinamide Pregnancy And Lactation Text: These medications are considered safe during pregnancy.

## 2022-11-21 ENCOUNTER — APPOINTMENT (OUTPATIENT)
Dept: CARDIOLOGY | Facility: CLINIC | Age: 77
End: 2022-11-21

## 2022-11-28 RX ORDER — EMPAGLIFLOZIN 10 MG/1
10 TABLET, FILM COATED ORAL DAILY
Qty: 90 | Refills: 3 | Status: ACTIVE | COMMUNITY
Start: 2022-11-10 | End: 1900-01-01

## 2022-11-30 ENCOUNTER — APPOINTMENT (OUTPATIENT)
Dept: CARDIOLOGY | Facility: CLINIC | Age: 77
End: 2022-11-30

## 2022-12-01 ENCOUNTER — APPOINTMENT (OUTPATIENT)
Dept: CARDIOLOGY | Facility: CLINIC | Age: 77
End: 2022-12-01

## 2023-01-05 ENCOUNTER — APPOINTMENT (OUTPATIENT)
Dept: CARDIOLOGY | Facility: CLINIC | Age: 78
End: 2023-01-05
Payer: MEDICARE

## 2023-01-05 VITALS
OXYGEN SATURATION: 97 % | HEIGHT: 71 IN | SYSTOLIC BLOOD PRESSURE: 204 MMHG | TEMPERATURE: 97.6 F | DIASTOLIC BLOOD PRESSURE: 68 MMHG | WEIGHT: 199 LBS | HEART RATE: 49 BPM | BODY MASS INDEX: 27.86 KG/M2

## 2023-01-05 VITALS — SYSTOLIC BLOOD PRESSURE: 172 MMHG | DIASTOLIC BLOOD PRESSURE: 60 MMHG

## 2023-01-05 DIAGNOSIS — I21.4 NON-ST ELEVATION (NSTEMI) MYOCARDIAL INFARCTION: ICD-10-CM

## 2023-01-05 PROCEDURE — 99214 OFFICE O/P EST MOD 30 MIN: CPT

## 2023-01-05 RX ORDER — LISINOPRIL 20 MG/1
20 TABLET ORAL
Qty: 90 | Refills: 1 | Status: DISCONTINUED | COMMUNITY
Start: 2022-11-10 | End: 2023-01-05

## 2023-01-05 NOTE — CARDIOLOGY SUMMARY
[de-identified] : Today 11/2022: NSR, LVH with strain pattern [de-identified] : 11/2022: LVEF 20-25% with global hypoK [de-identified] : 10/2022: no evidence of disease

## 2023-01-05 NOTE — HISTORY OF PRESENT ILLNESS
[FreeTextEntry1] : This is a very pleasant Slovenian speaking 76 year old man with a history of hypertension, colon CA s/p chemotherapy three years ago, who presented to the [facility] ED with shortness of breath and wheezing, found to have acute systolic heart failure with EF of 20-25%. Troponins were elevated, with concern for NSTEMI. Cardiac cath showed no angiographic evidence of disease and CT-A did not demonstrate acute PE. \par \par He was last seen by me a month ago, at which time he was tolerating his medications without issue. He had been feeling better from a heart failure standpoint and had no complaints. GDMT Was uptitrated. \par \par Today he presents again with his grandson. Feels well and has no complaints. Walking around the house. No chest pains or shortness of breath. Edema in legs has improved. No PND or orthopnea. BP elevated, initially with SBP of 200 then with SBP of 170. \par \par \par While he has been watching his fluid intake he hasn't been as careful with salt. Eats boxed foods and cold cuts at home.\par \par Otherwise, denies orthopnea, paroxysmal nocturnal dyspnea, lower extremity edema, unexplained weight gain or dyspnea on exertion.\par

## 2023-01-05 NOTE — DISCUSSION/SUMMARY
[FreeTextEntry1] : This is a very pleasant 75 yo man with a history of colon CA and longstanding HTN found to have new heart failure with severely reduced EF to 20-25%\par \par #Acute on chronic combined systolic and diastolic heart failure: NYHA I, Profile A, EF 20-25%\par Etiology: NICM, likely 2/2 HTN\par Beta blocker: continue coreg to 12.5 mg BID today\par ACE/ARNI: stop Lisinopril. Advised to START entresto 49/51 BID TOMORROW evening. \par MRA: continue aldactone 25 mg daily\par SGLT2 continue empagliflozin 10 mg daily today\par Diuretic: continue maintenance lasix 40 mg daily\par Repeat echocardiogram in one month to determine need for ICD\par 2 gram sodium diet\par call clinic if gain >3 lb overnight or  5 lb in one week\par return in 2 weeks for uptitration of meds\par \par #HTN: poorly controlled\par - see above plan for antihypertensives. Switched to Entresto with room to increase dose\par - DASH diet advised.\par \par #NSTEMI: likely 2/2 CHF. No evidence of atherosclerosis. Can continue aspirin.

## 2023-01-13 ENCOUNTER — APPOINTMENT (OUTPATIENT)
Dept: CARDIOLOGY | Facility: CLINIC | Age: 78
End: 2023-01-13

## 2023-02-02 ENCOUNTER — APPOINTMENT (OUTPATIENT)
Dept: CARDIOLOGY | Facility: CLINIC | Age: 78
End: 2023-02-02

## 2023-02-06 ENCOUNTER — APPOINTMENT (OUTPATIENT)
Dept: CARDIOLOGY | Facility: CLINIC | Age: 78
End: 2023-02-06

## 2023-02-26 ENCOUNTER — EMERGENCY (EMERGENCY)
Facility: HOSPITAL | Age: 78
LOS: 1 days | Discharge: HOME | End: 2023-02-26
Attending: EMERGENCY MEDICINE
Payer: MEDICARE

## 2023-02-26 VITALS
RESPIRATION RATE: 20 BRPM | SYSTOLIC BLOOD PRESSURE: 168 MMHG | OXYGEN SATURATION: 96 % | TEMPERATURE: 98 F | DIASTOLIC BLOOD PRESSURE: 63 MMHG | HEART RATE: 56 BPM

## 2023-02-26 VITALS
RESPIRATION RATE: 20 BRPM | SYSTOLIC BLOOD PRESSURE: 213 MMHG | WEIGHT: 195.11 LBS | HEIGHT: 71 IN | TEMPERATURE: 99 F | OXYGEN SATURATION: 97 % | DIASTOLIC BLOOD PRESSURE: 71 MMHG | HEART RATE: 65 BPM

## 2023-02-26 DIAGNOSIS — Z90.49 ACQUIRED ABSENCE OF OTHER SPECIFIED PARTS OF DIGESTIVE TRACT: Chronic | ICD-10-CM

## 2023-02-26 DIAGNOSIS — Z98.890 OTHER SPECIFIED POSTPROCEDURAL STATES: Chronic | ICD-10-CM

## 2023-02-26 LAB
ALBUMIN SERPL ELPH-MCNC: 3.8 G/DL — SIGNIFICANT CHANGE UP (ref 3.3–5.2)
ALP SERPL-CCNC: 149 U/L — HIGH (ref 40–120)
ALT FLD-CCNC: 13 U/L — SIGNIFICANT CHANGE UP
ANION GAP SERPL CALC-SCNC: 11 MMOL/L — SIGNIFICANT CHANGE UP (ref 5–17)
APPEARANCE UR: ABNORMAL
AST SERPL-CCNC: 18 U/L — SIGNIFICANT CHANGE UP
BACTERIA # UR AUTO: ABNORMAL
BASOPHILS # BLD AUTO: 0.04 K/UL — SIGNIFICANT CHANGE UP (ref 0–0.2)
BASOPHILS NFR BLD AUTO: 0.3 % — SIGNIFICANT CHANGE UP (ref 0–2)
BILIRUB SERPL-MCNC: 1.1 MG/DL — SIGNIFICANT CHANGE UP (ref 0.4–2)
BILIRUB UR-MCNC: NEGATIVE — SIGNIFICANT CHANGE UP
BUN SERPL-MCNC: 33.2 MG/DL — HIGH (ref 8–20)
CALCIUM SERPL-MCNC: 9 MG/DL — SIGNIFICANT CHANGE UP (ref 8.4–10.5)
CHLORIDE SERPL-SCNC: 107 MMOL/L — SIGNIFICANT CHANGE UP (ref 96–108)
CO2 SERPL-SCNC: 24 MMOL/L — SIGNIFICANT CHANGE UP (ref 22–29)
COLOR SPEC: ABNORMAL
CREAT SERPL-MCNC: 1.23 MG/DL — SIGNIFICANT CHANGE UP (ref 0.5–1.3)
DIFF PNL FLD: ABNORMAL
EGFR: 60 ML/MIN/1.73M2 — SIGNIFICANT CHANGE UP
EOSINOPHIL # BLD AUTO: 0.44 K/UL — SIGNIFICANT CHANGE UP (ref 0–0.5)
EOSINOPHIL NFR BLD AUTO: 3.5 % — SIGNIFICANT CHANGE UP (ref 0–6)
EPI CELLS # UR: SIGNIFICANT CHANGE UP
GLUCOSE SERPL-MCNC: 97 MG/DL — SIGNIFICANT CHANGE UP (ref 70–99)
GLUCOSE UR QL: NEGATIVE MG/DL — SIGNIFICANT CHANGE UP
HCT VFR BLD CALC: 43.1 % — SIGNIFICANT CHANGE UP (ref 39–50)
HGB BLD-MCNC: 14.7 G/DL — SIGNIFICANT CHANGE UP (ref 13–17)
IMM GRANULOCYTES NFR BLD AUTO: 0.4 % — SIGNIFICANT CHANGE UP (ref 0–0.9)
KETONES UR-MCNC: ABNORMAL
LEUKOCYTE ESTERASE UR-ACNC: ABNORMAL
LYMPHOCYTES # BLD AUTO: 1.04 K/UL — SIGNIFICANT CHANGE UP (ref 1–3.3)
LYMPHOCYTES # BLD AUTO: 8.4 % — LOW (ref 13–44)
MAGNESIUM SERPL-MCNC: 2.1 MG/DL — SIGNIFICANT CHANGE UP (ref 1.6–2.6)
MCHC RBC-ENTMCNC: 31.4 PG — SIGNIFICANT CHANGE UP (ref 27–34)
MCHC RBC-ENTMCNC: 34.1 GM/DL — SIGNIFICANT CHANGE UP (ref 32–36)
MCV RBC AUTO: 92.1 FL — SIGNIFICANT CHANGE UP (ref 80–100)
MONOCYTES # BLD AUTO: 0.92 K/UL — HIGH (ref 0–0.9)
MONOCYTES NFR BLD AUTO: 7.4 % — SIGNIFICANT CHANGE UP (ref 2–14)
NEUTROPHILS # BLD AUTO: 9.92 K/UL — HIGH (ref 1.8–7.4)
NEUTROPHILS NFR BLD AUTO: 80 % — HIGH (ref 43–77)
NITRITE UR-MCNC: NEGATIVE — SIGNIFICANT CHANGE UP
PH UR: 6.5 — SIGNIFICANT CHANGE UP (ref 5–8)
PLATELET # BLD AUTO: 210 K/UL — SIGNIFICANT CHANGE UP (ref 150–400)
POTASSIUM SERPL-MCNC: 5 MMOL/L — SIGNIFICANT CHANGE UP (ref 3.5–5.3)
POTASSIUM SERPL-SCNC: 5 MMOL/L — SIGNIFICANT CHANGE UP (ref 3.5–5.3)
PROT SERPL-MCNC: 7.3 G/DL — SIGNIFICANT CHANGE UP (ref 6.6–8.7)
PROT UR-MCNC: 500 MG/DL
RBC # BLD: 4.68 M/UL — SIGNIFICANT CHANGE UP (ref 4.2–5.8)
RBC # FLD: 13.1 % — SIGNIFICANT CHANGE UP (ref 10.3–14.5)
RBC CASTS # UR COMP ASSIST: >50 /HPF (ref 0–4)
SODIUM SERPL-SCNC: 142 MMOL/L — SIGNIFICANT CHANGE UP (ref 135–145)
SP GR SPEC: 1.01 — SIGNIFICANT CHANGE UP (ref 1.01–1.02)
UROBILINOGEN FLD QL: NEGATIVE MG/DL — SIGNIFICANT CHANGE UP
WBC # BLD: 12.41 K/UL — HIGH (ref 3.8–10.5)
WBC # FLD AUTO: 12.41 K/UL — HIGH (ref 3.8–10.5)
WBC UR QL: >50 /HPF (ref 0–5)

## 2023-02-26 PROCEDURE — 36415 COLL VENOUS BLD VENIPUNCTURE: CPT

## 2023-02-26 PROCEDURE — 96374 THER/PROPH/DIAG INJ IV PUSH: CPT | Mod: XU

## 2023-02-26 PROCEDURE — 83735 ASSAY OF MAGNESIUM: CPT

## 2023-02-26 PROCEDURE — 74177 CT ABD & PELVIS W/CONTRAST: CPT | Mod: MA

## 2023-02-26 PROCEDURE — 81001 URINALYSIS AUTO W/SCOPE: CPT

## 2023-02-26 PROCEDURE — 99284 EMERGENCY DEPT VISIT MOD MDM: CPT | Mod: 25

## 2023-02-26 PROCEDURE — T1013: CPT

## 2023-02-26 PROCEDURE — 85025 COMPLETE CBC W/AUTO DIFF WBC: CPT

## 2023-02-26 PROCEDURE — 87186 SC STD MICRODIL/AGAR DIL: CPT

## 2023-02-26 PROCEDURE — 80053 COMPREHEN METABOLIC PANEL: CPT

## 2023-02-26 PROCEDURE — 74177 CT ABD & PELVIS W/CONTRAST: CPT | Mod: 26,MA

## 2023-02-26 PROCEDURE — 99284 EMERGENCY DEPT VISIT MOD MDM: CPT

## 2023-02-26 PROCEDURE — 87086 URINE CULTURE/COLONY COUNT: CPT

## 2023-02-26 RX ORDER — CEFTRIAXONE 500 MG/1
1000 INJECTION, POWDER, FOR SOLUTION INTRAMUSCULAR; INTRAVENOUS ONCE
Refills: 0 | Status: DISCONTINUED | OUTPATIENT
Start: 2023-02-26 | End: 2023-02-26

## 2023-02-26 RX ORDER — CARVEDILOL PHOSPHATE 80 MG/1
6.25 CAPSULE, EXTENDED RELEASE ORAL ONCE
Refills: 0 | Status: DISCONTINUED | OUTPATIENT
Start: 2023-02-26 | End: 2023-02-26

## 2023-02-26 RX ORDER — CEFPODOXIME PROXETIL 100 MG
1 TABLET ORAL
Qty: 14 | Refills: 0
Start: 2023-02-26 | End: 2023-03-04

## 2023-02-26 RX ORDER — CEFTRIAXONE 500 MG/1
1000 INJECTION, POWDER, FOR SOLUTION INTRAMUSCULAR; INTRAVENOUS ONCE
Refills: 0 | Status: COMPLETED | OUTPATIENT
Start: 2023-02-26 | End: 2023-02-26

## 2023-02-26 RX ORDER — LEVOFLOXACIN 5 MG/ML
1 INJECTION, SOLUTION INTRAVENOUS
Qty: 5 | Refills: 0
Start: 2023-02-26 | End: 2023-03-02

## 2023-02-26 RX ORDER — CARVEDILOL PHOSPHATE 80 MG/1
12.5 CAPSULE, EXTENDED RELEASE ORAL ONCE
Refills: 0 | Status: COMPLETED | OUTPATIENT
Start: 2023-02-26 | End: 2023-02-26

## 2023-02-26 RX ADMIN — CEFTRIAXONE 1000 MILLIGRAM(S): 500 INJECTION, POWDER, FOR SOLUTION INTRAMUSCULAR; INTRAVENOUS at 17:52

## 2023-02-26 RX ADMIN — CARVEDILOL PHOSPHATE 12.5 MILLIGRAM(S): 80 CAPSULE, EXTENDED RELEASE ORAL at 15:12

## 2023-02-26 NOTE — ED PROVIDER NOTE - OBJECTIVE STATEMENT
77 year old male with hx of Pericardial Effusion s/p drain (12/21), HTN, Neuropathy, Colon CA/Kidney CA s/p resection, BPH s/p TURP who presents with hematuria. Patient typically uses 77 year old male with hx of Pericardial Effusion s/p drain (12/21), HTN, Neuropathy, Colon CA/Kidney CA s/p resection, BPH s/p TURP who presents with hematuria. Patient typically uses urinals to void. For the last 3 days he has had dysuria, hematuria, and interruption of his stream. His constellation of symptoms feels similar to his prior UTIs. No fevers, chills, chest pain, shortness of breath, or abdominal pain. Last TURP was 2.5 years ago. Had chemotherapy/additional colon resection 1.5 years ago. Has not had follow up for his kidney cancer. Quit smoking 20 years but smoked for 30 years. Patient has been traveling back/forth from [place]. Denies etoh use.

## 2023-02-26 NOTE — ED PROVIDER NOTE - NS ED ROS FT
Constitutional: no fever, no chills  Head: NC, AT   Eyes: no redness   ENMT: no nasal congestion/drainage, no sore throat   CV: no chest pain, no edema  Resp: no cough, no dyspnea  GI: no abdominal pain, no nausea, no vomiting, no diarrhea  : +dysuria, hematuria    Skin: no lesions, no rashes   Neuro: no LOC, no headache, no sensory deficits, no weakness

## 2023-02-26 NOTE — ED ADULT NURSE NOTE - CHIEF COMPLAINT QUOTE
Pt A&Ox4, NAD. Pt presents to the ED with complaints of hematuria x3 days. Pt hypertensive in triage, ran out of meds a few days ago. Pt denies HA, CP.  Breathing even and unlabored.

## 2023-02-26 NOTE — ED PROVIDER NOTE - NSCAREINITIATED _GEN_ER
Oneida Zeng(Resident) Hydroxyzine Counseling: Patient advised that the medication is sedating and not to drive a car after taking this medication.  Patient informed of potential adverse effects including but not limited to dry mouth, urinary retention, and blurry vision.  The patient verbalized understanding of the proper use and possible adverse effects of hydroxyzine.  All of the patient's questions and concerns were addressed.

## 2023-02-26 NOTE — ED PROVIDER NOTE - PHYSICAL EXAMINATION
General: well appearing, NAD  Head: NC, AT  EENT: EOMI, no scleral icterus  Cardiac: bradycardia, no apparent murmurs  Respiratory: CTABL, no respiratory distress   Abdomen: soft, ND, NT, nonperitonitic, no CVAT  MSK/Vascular: full ROM, soft compartments, warm extremities  Neuro: AAOx3, sensation to light touch intact  Psych: calm, cooperative

## 2023-02-26 NOTE — ED ADULT NURSE NOTE - OBJECTIVE STATEMENT
pt a&ox3, vss except hypertensive, pt c/o hematuria x3 days. pt in NAD, pt ran out of BP meds 1 week ago, pt asymptomatically hypertensive, denies ha, cp or sob. denies fevers, n/v/d or fevers. POC discussed w/ patient, pt placed on cardiac monitor/. respirations even and unlabored. will continue to reassess.

## 2023-02-26 NOTE — ED ADULT TRIAGE NOTE - CHIEF COMPLAINT QUOTE
Pt A&Ox4, NAD. Pt presents to the ED with complaints of hematuria x3 days. Pt hypertensive in triage. Breathing even and unlabored. Pt A&Ox4, NAD. Pt presents to the ED with complaints of hematuria x3 days. Pt hypertensive in triage, ran out of meds a few days ago. Pt denies HA, CP.  Breathing even and unlabored.

## 2023-02-26 NOTE — ED PROVIDER NOTE - CARE PROVIDER_API CALL
Radha Tiwari)  Urology  [address], [address]  [place] [address]  Phone: [phone]  Fax: [phone]  Follow Up Time:     Steven Oglesby)  Urology  [address] [facility], [address]  [place] [address]  Phone: [phone]  Fax: [phone]  Follow Up Time:

## 2023-02-26 NOTE — ED PROVIDER NOTE - CLINICAL SUMMARY MEDICAL DECISION MAKING FREE TEXT BOX
77 year old male with hx of colon/kidney cancer who presents with dysuria and hematuria. Labs with wbc 12 and UA c/w UTI. CT abdomen/pelvis with "cystitis with possible prostatitis. Mild bilateral hydroureteronephrosis; 1.9 cm right renal lesion unchanged from 2021." Pt will be discharged with Rx for abx, ****

## 2023-02-26 NOTE — ED PROVIDER NOTE - NSFOLLOWUPINSTRUCTIONS_ED_ALL_ED_FT
-A prescription for antibiotics was sent to your pharmacy - please take all doses even if your symptoms completely resolve  -Follow up with your urologist - you have a nodule on your kidney that is 1.9cm  -Return with any new/worse/concerning symptoms     Urinary Tract Infection    A urinary tract infection (UTI) is an infection of any part of the urinary tract, which includes the kidneys, ureters, bladder, and urethra. Risk factors include ignoring your need to urinate, wiping back to front if female, being an uncircumcised male, and having diabetes or a weak immune system. Symptoms include frequent urination, pain or burning with urination, foul smelling urine, cloudy urine, pain in the lower abdomen, blood in the urine, and fever. If you were prescribed an antibiotic medicine, take it as told by your health care provider. Do not stop taking the antibiotic even if you start to feel better.    SEEK IMMEDIATE MEDICAL CARE IF YOU HAVE ANY OF THE FOLLOWING SYMPTOMS: severe back or abdominal pain, fever, inability to keep fluids or medicine down, dizziness/lightheadedness, or a change in mental status.

## 2023-02-26 NOTE — ED PROVIDER NOTE - PROGRESS NOTE DETAILS
Karri: LORI Ferrara performed PVR with <106 ml in bladder. Karri: I discussed results/plan of care with patient's daughter - Rx for [facility]quin [facility] to [facility]. Patient's daughter [phone].

## 2023-02-26 NOTE — ED PROVIDER NOTE - PATIENT PORTAL LINK FT
You can access the FollowMyHealth Patient Portal offered by [facility] by registering at the following website: [email or web address]. By joining [facility]’s FollowMyHealth portal, you will also be able to view your health information using other applications (apps) compatible with our system.

## 2023-02-26 NOTE — ED PROVIDER NOTE - ATTENDING CONTRIBUTION TO CARE
Dr. Mak : I have personally seen and examined this patient at the bedside. I have fully participated in the care of this patient. I have reviewed all pertinent clinical information, including history, physical exam, plan and the Resident's note and agree except as noted.       77 year old male with hx of Pericardial Effusion s/p drain (12/21), HTN, Neuropathy, Colon CA/Kidney CA s/p resection, BPH s/p TURP who presents with hematuria. Patient typically uses urinals to void. For the last 3 days he has had dysuria, hematuria, and interruption of his stream. His constellation of symptoms feels similar to his prior UTIs.     Denies f/c/n/v/cp/sob/palpitations/cough/abd.pain/d/c. no sick contacts.    PE:  head; atraumatic normocephalic  eyes: perrla  Heart: rrr s1s2  lungs: ctab  abd: soft, nt nd + bs no rebound/guarding no cva ttp  le: no swelling no calf ttp  back: no midline cervical/thoracic/lumbar ttp      -->ua/ct consistent with cystitis no ,kidney stone; indeterminate lesion unchanged - results discussed with daughter and pt- explained the need to follow up with urology and more imaging regardign the lesion as hx of kidney ca

## 2023-03-02 ENCOUNTER — APPOINTMENT (OUTPATIENT)
Dept: UROLOGY | Facility: CLINIC | Age: 78
End: 2023-03-02
Payer: MEDICARE

## 2023-03-02 VITALS — DIASTOLIC BLOOD PRESSURE: 70 MMHG | HEART RATE: 59 BPM | SYSTOLIC BLOOD PRESSURE: 182 MMHG

## 2023-03-02 DIAGNOSIS — N39.0 URINARY TRACT INFECTION, SITE NOT SPECIFIED: ICD-10-CM

## 2023-03-02 LAB
BILIRUB UR QL STRIP: NORMAL
CLARITY UR: CLEAR
COLLECTION METHOD: NORMAL
GLUCOSE UR-MCNC: NORMAL
HCG UR QL: 0.2 EU/DL
HGB UR QL STRIP.AUTO: ABNORMAL
KETONES UR-MCNC: NORMAL
LEUKOCYTE ESTERASE UR QL STRIP: ABNORMAL
NITRITE UR QL STRIP: NORMAL
PH UR STRIP: 5.5
PROT UR STRIP-MCNC: ABNORMAL
SP GR UR STRIP: 1.02

## 2023-03-02 PROCEDURE — 81003 URINALYSIS AUTO W/O SCOPE: CPT | Mod: QW

## 2023-03-02 PROCEDURE — 99204 OFFICE O/P NEW MOD 45 MIN: CPT

## 2023-03-02 NOTE — ASSESSMENT
[FreeTextEntry1] : Impression:\par \par E. coli UTI\par on antibiotics. \par History of kidney cancer, possible recurrent  tumor. \par \par Plan:\par \par finish antibiotics\par Abdominal MRI with contrast\par UA and PVR on followup

## 2023-03-02 NOTE — PHYSICAL EXAM
[General Appearance - Well Developed] : well developed [General Appearance - Well Nourished] : well nourished [Normal Appearance] : normal appearance [Well Groomed] : well groomed [General Appearance - In No Acute Distress] : no acute distress [Edema] : no peripheral edema [Respiration, Rhythm And Depth] : normal respiratory rhythm and effort [Exaggerated Use Of Accessory Muscles For Inspiration] : no accessory muscle use [Abdomen Soft] : soft [Abdomen Tenderness] : non-tender [Costovertebral Angle Tenderness] : no ~M costovertebral angle tenderness [Normal Station and Gait] : the gait and station were normal for the patient's age [] : no rash

## 2023-03-02 NOTE — HISTORY OF PRESENT ILLNESS
[FreeTextEntry1] : The patient was in the ER for follow up of UTI.  He had a bladder scan of 106.  he was given a script for Levaquin but I suspect it was switched to Cipro since he takes it twice daily.  No hematuria or dysuria.

## 2023-03-09 ENCOUNTER — APPOINTMENT (OUTPATIENT)
Dept: CARDIOLOGY | Facility: CLINIC | Age: 78
End: 2023-03-09
Payer: MEDICARE

## 2023-03-09 VITALS
WEIGHT: 193 LBS | DIASTOLIC BLOOD PRESSURE: 56 MMHG | HEIGHT: 71 IN | OXYGEN SATURATION: 98 % | SYSTOLIC BLOOD PRESSURE: 144 MMHG | TEMPERATURE: 97.8 F | BODY MASS INDEX: 27.02 KG/M2 | HEART RATE: 50 BPM

## 2023-03-09 DIAGNOSIS — I51.9 HEART DISEASE, UNSPECIFIED: ICD-10-CM

## 2023-03-09 DIAGNOSIS — R06.02 SHORTNESS OF BREATH: ICD-10-CM

## 2023-03-09 PROCEDURE — 99214 OFFICE O/P EST MOD 30 MIN: CPT

## 2023-03-09 PROCEDURE — 93306 TTE W/DOPPLER COMPLETE: CPT

## 2023-03-09 RX ORDER — TAMSULOSIN HYDROCHLORIDE 0.4 MG/1
0.4 CAPSULE ORAL
Qty: 90 | Refills: 1 | Status: DISCONTINUED | COMMUNITY
Start: 2022-11-10 | End: 2023-03-09

## 2023-03-09 RX ORDER — CARVEDILOL 12.5 MG/1
12.5 TABLET, FILM COATED ORAL TWICE DAILY
Qty: 180 | Refills: 3 | Status: DISCONTINUED | COMMUNITY
Start: 2022-11-10 | End: 2023-03-09

## 2023-03-09 NOTE — CARDIOLOGY SUMMARY
[de-identified] : Today 11/2022: NSR, LVH with strain pattern [de-identified] : 11/2022: LVEF 20-25% with global hypoK\par \par 3/2023: global hypoK, LVEF 43%, moderate AI [de-identified] : 10/2022: no evidence of disease

## 2023-03-09 NOTE — HISTORY OF PRESENT ILLNESS
[FreeTextEntry1] : This is a very pleasant Azeri speaking 76 year old man with a history of hypertension, colon CA s/p chemotherapy three years ago, who presented to the [facility] ED with shortness of breath and wheezing, found to have acute systolic heart failure with EF of 20-25%. Troponins were elevated, with concern for NSTEMI. Cardiac cath showed no angiographic evidence of disease and CT-A did not demonstrate acute PE. \par \par He was last seen by me a month ago, at which time he was tolerating his medications without issue. He had been feeling better from a heart failure standpoint and had no complaints. GDMT Was uptitrated. \par \par Today he feels well and has no complaints. Echocardiogram performed earlier today demonstrates improvement in overall LV function with an LVEF of ~43%. He denies signs or symptoms of heart failure at rest or with exertion. Denies chest pain, shortness of breath, orthopnea. Taking medications as prescribed.

## 2023-03-09 NOTE — DISCUSSION/SUMMARY
[FreeTextEntry1] : This is a very pleasant 76 yo man with a history of colon CA and longstanding HTN found to have new heart failure with severely reduced EF to 20-25%. He has been in my care since then with uptitration of GDMT and feels well. \par \par #chronic combined systolic and diastolic heart failure: NYHA I, Profile A, EF 40-43%\par Etiology: NICM, likely 2/2 HTN\par Beta blocker: unable to tolerate due to low heart rate\par ACE/ARNI: stopp Lisinopril. Advised to START entresto 49/51 BID. The patient forgot to pick this up from the pharmacy but will do so today.\par MRA: continue aldactone 25 mg daily\par SGLT2 continue empagliflozin 10 mg daily today\par Diuretic: continue maintenance lasix 40 mg daily\par No nee dfor ICD \par 2 gram sodium diet\par call clinic if gain >3 lb overnight or  5 lb in one week\par \par #HTN: better controlled\par - see above plan for antihypertensives. Switched to Entresto with room to increase dose\par - DASH diet advised.\par \par #NSTEMI: likely 2/2 CHF. No evidence of atherosclerosis. Can continue aspirin.\par \par Patient was advised to partake in 150 minutes of moderate exercise per week.\par Patient was advised to see us in 6 months if stable and certainly earlier with any new symptoms.\par Patient was advised to contact the office or seek medical care for any new or concerning symptoms right away.  Patient verbalized understanding and is in agreement with the above plan.\par

## 2023-03-11 ENCOUNTER — APPOINTMENT (OUTPATIENT)
Dept: MRI IMAGING | Facility: CLINIC | Age: 78
End: 2023-03-11

## 2023-03-16 ENCOUNTER — APPOINTMENT (OUTPATIENT)
Dept: UROLOGY | Facility: CLINIC | Age: 78
End: 2023-03-16

## 2023-04-08 ENCOUNTER — APPOINTMENT (OUTPATIENT)
Dept: MRI IMAGING | Facility: CLINIC | Age: 78
End: 2023-04-08

## 2023-06-05 ENCOUNTER — RX RENEWAL (OUTPATIENT)
Age: 78
End: 2023-06-05

## 2023-09-07 ENCOUNTER — APPOINTMENT (OUTPATIENT)
Dept: CARDIOLOGY | Facility: CLINIC | Age: 78
End: 2023-09-07
Payer: MEDICARE

## 2023-09-07 VITALS
TEMPERATURE: 98.3 F | SYSTOLIC BLOOD PRESSURE: 200 MMHG | HEART RATE: 51 BPM | WEIGHT: 204 LBS | BODY MASS INDEX: 28.45 KG/M2 | OXYGEN SATURATION: 96 % | DIASTOLIC BLOOD PRESSURE: 72 MMHG

## 2023-09-07 VITALS — DIASTOLIC BLOOD PRESSURE: 66 MMHG | SYSTOLIC BLOOD PRESSURE: 200 MMHG

## 2023-09-07 DIAGNOSIS — I50.42 CHRONIC COMBINED SYSTOLIC (CONGESTIVE) AND DIASTOLIC (CONGESTIVE) HEART FAILURE: ICD-10-CM

## 2023-09-07 DIAGNOSIS — I10 ESSENTIAL (PRIMARY) HYPERTENSION: ICD-10-CM

## 2023-09-07 DIAGNOSIS — E78.5 HYPERLIPIDEMIA, UNSPECIFIED: ICD-10-CM

## 2023-09-07 DIAGNOSIS — I50.9 HEART FAILURE, UNSPECIFIED: ICD-10-CM

## 2023-09-07 PROCEDURE — 93000 ELECTROCARDIOGRAM COMPLETE: CPT

## 2023-09-07 PROCEDURE — 99215 OFFICE O/P EST HI 40 MIN: CPT | Mod: 25

## 2023-09-07 NOTE — DISCUSSION/SUMMARY
[FreeTextEntry1] : This is a very pleasant 76 yo man with a history of colon CA and longstanding HTN found to have new heart failure with severely reduced EF to 20-25%. He has been in my care since then with uptitration of GDMT and feels well.   #chronic combined systolic and diastolic heart failure: NYHA I, Profile A, EF 40-43%  Etiology: NICM, likely 2/2 HTN Beta blocker: unable to tolerate due to low heart rate ACE/ARNI:On Entresto 49/51. Increase to 97/101 BID today.  MRA: continue aldactone 25 mg daily SGLT2 continue empagliflozin 10 mg daily today Diuretic: continue maintenance lasix 40 mg daily No need for ICD  2 gram sodium diet call clinic if gain >3 lb overnight or  5 lb in one week  #HTN: uncontrolled, forgot to take medications - see above plan for antihypertensives. Switched to Entresto with room to increase dose - DASH diet advised.  #NSTEMI: likely 2/2 CHF. No evidence of atherosclerosis. Can continue aspirin.  Patient was advised to partake in 150 minutes of moderate exercise per week. Patient was advised to see us in 6 months if stable and certainly earlier with any new symptoms. Patient was advised to contact the office or seek medical care for any new or concerning symptoms right away.  Patient verbalized understanding and is in agreement with the above plan.  [EKG obtained to assist in diagnosis and management of assessed problem(s)] : EKG obtained to assist in diagnosis and management of assessed problem(s)

## 2023-09-07 NOTE — HISTORY OF PRESENT ILLNESS
[FreeTextEntry1] : This is a very pleasant Nigerien speaking 76 year old man with a history of hypertension, colon CA s/p chemotherapy three years ago, who presented to the [facility] ED with shortness of breath and wheezing, found to have acute systolic heart failure with EF of 20-25%. Troponins were elevated, with concern for NSTEMI. Cardiac cath showed no angiographic evidence of disease and CT-A did not demonstrate acute PE.   Last seen by me three months ago. Started on entresto at that time. LVEF was 44% on prior echocardiogram which showed improvement in overall function.   Today feels well but BP is exceedingly high at 200/66. He forgot to take any medications and ran out of Entresto yesterday.   Otherwise, denies orthopnea, paroxysmal nocturnal dyspnea, lower extremity edema, unexplained weight gain or dyspnea on exertion.  ECG today shows normal sinus erin with LVH.

## 2023-09-07 NOTE — CARDIOLOGY SUMMARY
[de-identified] : Today 11/2022: NSR, LVH with strain pattern [de-identified] : 11/2022: LVEF 20-25% with global hypoK\par  \par  3/2023: global hypoK, LVEF 43%, moderate AI [de-identified] : 10/2022: no evidence of disease

## 2023-09-21 NOTE — PROGRESS NOTE ADULT - ASSESSMENT
HPI:  74 y/o female with PMH of kidney ca and colon ca s/p resection, HTN, came to the ED complaining of pleuritic chest pain. Patient said 7-8 days ago, he had a chest pressure on the left that woke him up from sleep, lasted few minutes and resolved without intervention. After started having chest pain with breathing, radiating to the back associated with shortness of breath. He also endorsed fever started 3 days ago. Of note, he has been having hematuria x 6 days. He has no palpitation, nausea, vomiting, abdominal pain, diarrhea, dysuria, sick contact.    (11 Dec 2021 20:23)  Patient underwent drainage of pericardial effusion and creation pericardial window on 10/17. Denies CP, SOB, Abdominal pain, nausea, vomiting headache.  FARHAT with 65 cc drainage overnight Right Posterior Knee:   PSO.  Bandaid HPI:  76 y/o male with PMH of kidney ca and colon ca s/p resection, HTN, came to the ED complaining of pleuritic chest pain. Patient said 7-8 days ago, he had a chest pressure on the left that woke him up from sleep, lasted few minutes and resolved without intervention. After started having chest pain with breathing, radiating to the back associated with shortness of breath. He also endorsed fever started 3 days ago. Of note, he has been having hematuria x 6 days. He has no palpitation, nausea, vomiting, abdominal pain, diarrhea, dysuria, sick contact.    (11 Dec 2021 20:23)  Patient underwent drainage of pericardial effusion and creation pericardial window on 10/17. Denies CP, SOB, Abdominal pain, nausea, vomiting headache.  FARHAT with 65 cc drainage overnight

## 2023-10-06 ENCOUNTER — APPOINTMENT (OUTPATIENT)
Dept: CARDIOLOGY | Facility: CLINIC | Age: 78
End: 2023-10-06

## 2023-11-22 ENCOUNTER — RX RENEWAL (OUTPATIENT)
Age: 78
End: 2023-11-22

## 2024-01-05 ENCOUNTER — EMERGENCY (EMERGENCY)
Facility: HOSPITAL | Age: 79
LOS: 1 days | Discharge: HOME | End: 2024-01-05
Attending: EMERGENCY MEDICINE
Payer: COMMERCIAL

## 2024-01-05 VITALS
WEIGHT: 192.02 LBS | RESPIRATION RATE: 18 BRPM | DIASTOLIC BLOOD PRESSURE: 84 MMHG | SYSTOLIC BLOOD PRESSURE: 240 MMHG | OXYGEN SATURATION: 97 % | HEART RATE: 66 BPM | TEMPERATURE: 99 F

## 2024-01-05 DIAGNOSIS — Z98.890 OTHER SPECIFIED POSTPROCEDURAL STATES: Chronic | ICD-10-CM

## 2024-01-05 DIAGNOSIS — Z90.49 ACQUIRED ABSENCE OF OTHER SPECIFIED PARTS OF DIGESTIVE TRACT: Chronic | ICD-10-CM

## 2024-01-05 LAB
ALBUMIN SERPL ELPH-MCNC: 3.9 G/DL — SIGNIFICANT CHANGE UP (ref 3.3–5.2)
ALBUMIN SERPL ELPH-MCNC: 3.9 G/DL — SIGNIFICANT CHANGE UP (ref 3.3–5.2)
ALP SERPL-CCNC: 124 U/L — HIGH (ref 40–120)
ALP SERPL-CCNC: 124 U/L — HIGH (ref 40–120)
ALT FLD-CCNC: 15 U/L — SIGNIFICANT CHANGE UP
ALT FLD-CCNC: 15 U/L — SIGNIFICANT CHANGE UP
ANION GAP SERPL CALC-SCNC: 13 MMOL/L — SIGNIFICANT CHANGE UP (ref 5–17)
ANION GAP SERPL CALC-SCNC: 13 MMOL/L — SIGNIFICANT CHANGE UP (ref 5–17)
APPEARANCE UR: ABNORMAL
APPEARANCE UR: ABNORMAL
AST SERPL-CCNC: 22 U/L — SIGNIFICANT CHANGE UP
AST SERPL-CCNC: 22 U/L — SIGNIFICANT CHANGE UP
BACTERIA # UR AUTO: NEGATIVE /HPF — SIGNIFICANT CHANGE UP
BACTERIA # UR AUTO: NEGATIVE /HPF — SIGNIFICANT CHANGE UP
BASOPHILS # BLD AUTO: 0.05 K/UL — SIGNIFICANT CHANGE UP (ref 0–0.2)
BASOPHILS # BLD AUTO: 0.05 K/UL — SIGNIFICANT CHANGE UP (ref 0–0.2)
BASOPHILS NFR BLD AUTO: 0.5 % — SIGNIFICANT CHANGE UP (ref 0–2)
BASOPHILS NFR BLD AUTO: 0.5 % — SIGNIFICANT CHANGE UP (ref 0–2)
BILIRUB SERPL-MCNC: 0.3 MG/DL — LOW (ref 0.4–2)
BILIRUB SERPL-MCNC: 0.3 MG/DL — LOW (ref 0.4–2)
BILIRUB UR-MCNC: NEGATIVE — SIGNIFICANT CHANGE UP
BILIRUB UR-MCNC: NEGATIVE — SIGNIFICANT CHANGE UP
BUN SERPL-MCNC: 10.7 MG/DL — SIGNIFICANT CHANGE UP (ref 8–20)
BUN SERPL-MCNC: 10.7 MG/DL — SIGNIFICANT CHANGE UP (ref 8–20)
CALCIUM SERPL-MCNC: 8.7 MG/DL — SIGNIFICANT CHANGE UP (ref 8.4–10.5)
CALCIUM SERPL-MCNC: 8.7 MG/DL — SIGNIFICANT CHANGE UP (ref 8.4–10.5)
CAST: 0 /LPF — SIGNIFICANT CHANGE UP (ref 0–4)
CAST: 0 /LPF — SIGNIFICANT CHANGE UP (ref 0–4)
CHLORIDE SERPL-SCNC: 104 MMOL/L — SIGNIFICANT CHANGE UP (ref 96–108)
CHLORIDE SERPL-SCNC: 104 MMOL/L — SIGNIFICANT CHANGE UP (ref 96–108)
CO2 SERPL-SCNC: 25 MMOL/L — SIGNIFICANT CHANGE UP (ref 22–29)
CO2 SERPL-SCNC: 25 MMOL/L — SIGNIFICANT CHANGE UP (ref 22–29)
COLOR SPEC: ABNORMAL
COLOR SPEC: ABNORMAL
CREAT SERPL-MCNC: 1.05 MG/DL — SIGNIFICANT CHANGE UP (ref 0.5–1.3)
CREAT SERPL-MCNC: 1.05 MG/DL — SIGNIFICANT CHANGE UP (ref 0.5–1.3)
DIFF PNL FLD: ABNORMAL
DIFF PNL FLD: ABNORMAL
EGFR: 72 ML/MIN/1.73M2 — SIGNIFICANT CHANGE UP
EGFR: 72 ML/MIN/1.73M2 — SIGNIFICANT CHANGE UP
EOSINOPHIL # BLD AUTO: 0.47 K/UL — SIGNIFICANT CHANGE UP (ref 0–0.5)
EOSINOPHIL # BLD AUTO: 0.47 K/UL — SIGNIFICANT CHANGE UP (ref 0–0.5)
EOSINOPHIL NFR BLD AUTO: 4.8 % — SIGNIFICANT CHANGE UP (ref 0–6)
EOSINOPHIL NFR BLD AUTO: 4.8 % — SIGNIFICANT CHANGE UP (ref 0–6)
GLUCOSE SERPL-MCNC: 98 MG/DL — SIGNIFICANT CHANGE UP (ref 70–99)
GLUCOSE SERPL-MCNC: 98 MG/DL — SIGNIFICANT CHANGE UP (ref 70–99)
GLUCOSE UR QL: NEGATIVE MG/DL — SIGNIFICANT CHANGE UP
GLUCOSE UR QL: NEGATIVE MG/DL — SIGNIFICANT CHANGE UP
HCT VFR BLD CALC: 41.7 % — SIGNIFICANT CHANGE UP (ref 39–50)
HCT VFR BLD CALC: 41.7 % — SIGNIFICANT CHANGE UP (ref 39–50)
HGB BLD-MCNC: 14.5 G/DL — SIGNIFICANT CHANGE UP (ref 13–17)
HGB BLD-MCNC: 14.5 G/DL — SIGNIFICANT CHANGE UP (ref 13–17)
IMM GRANULOCYTES NFR BLD AUTO: 1.3 % — HIGH (ref 0–0.9)
IMM GRANULOCYTES NFR BLD AUTO: 1.3 % — HIGH (ref 0–0.9)
KETONES UR-MCNC: NEGATIVE MG/DL — SIGNIFICANT CHANGE UP
KETONES UR-MCNC: NEGATIVE MG/DL — SIGNIFICANT CHANGE UP
LEUKOCYTE ESTERASE UR-ACNC: ABNORMAL
LEUKOCYTE ESTERASE UR-ACNC: ABNORMAL
LYMPHOCYTES # BLD AUTO: 1.43 K/UL — SIGNIFICANT CHANGE UP (ref 1–3.3)
LYMPHOCYTES # BLD AUTO: 1.43 K/UL — SIGNIFICANT CHANGE UP (ref 1–3.3)
LYMPHOCYTES # BLD AUTO: 14.7 % — SIGNIFICANT CHANGE UP (ref 13–44)
LYMPHOCYTES # BLD AUTO: 14.7 % — SIGNIFICANT CHANGE UP (ref 13–44)
MCHC RBC-ENTMCNC: 31.4 PG — SIGNIFICANT CHANGE UP (ref 27–34)
MCHC RBC-ENTMCNC: 31.4 PG — SIGNIFICANT CHANGE UP (ref 27–34)
MCHC RBC-ENTMCNC: 34.8 GM/DL — SIGNIFICANT CHANGE UP (ref 32–36)
MCHC RBC-ENTMCNC: 34.8 GM/DL — SIGNIFICANT CHANGE UP (ref 32–36)
MCV RBC AUTO: 90.3 FL — SIGNIFICANT CHANGE UP (ref 80–100)
MCV RBC AUTO: 90.3 FL — SIGNIFICANT CHANGE UP (ref 80–100)
MONOCYTES # BLD AUTO: 0.67 K/UL — SIGNIFICANT CHANGE UP (ref 0–0.9)
MONOCYTES # BLD AUTO: 0.67 K/UL — SIGNIFICANT CHANGE UP (ref 0–0.9)
MONOCYTES NFR BLD AUTO: 6.9 % — SIGNIFICANT CHANGE UP (ref 2–14)
MONOCYTES NFR BLD AUTO: 6.9 % — SIGNIFICANT CHANGE UP (ref 2–14)
NEUTROPHILS # BLD AUTO: 6.98 K/UL — SIGNIFICANT CHANGE UP (ref 1.8–7.4)
NEUTROPHILS # BLD AUTO: 6.98 K/UL — SIGNIFICANT CHANGE UP (ref 1.8–7.4)
NEUTROPHILS NFR BLD AUTO: 71.8 % — SIGNIFICANT CHANGE UP (ref 43–77)
NEUTROPHILS NFR BLD AUTO: 71.8 % — SIGNIFICANT CHANGE UP (ref 43–77)
NITRITE UR-MCNC: NEGATIVE — SIGNIFICANT CHANGE UP
NITRITE UR-MCNC: NEGATIVE — SIGNIFICANT CHANGE UP
NT-PROBNP SERPL-SCNC: 5360 PG/ML — HIGH (ref 0–300)
NT-PROBNP SERPL-SCNC: 5360 PG/ML — HIGH (ref 0–300)
PH UR: 5.5 — SIGNIFICANT CHANGE UP (ref 5–8)
PH UR: 5.5 — SIGNIFICANT CHANGE UP (ref 5–8)
PLATELET # BLD AUTO: 284 K/UL — SIGNIFICANT CHANGE UP (ref 150–400)
PLATELET # BLD AUTO: 284 K/UL — SIGNIFICANT CHANGE UP (ref 150–400)
POTASSIUM SERPL-MCNC: 4.5 MMOL/L — SIGNIFICANT CHANGE UP (ref 3.5–5.3)
POTASSIUM SERPL-MCNC: 4.5 MMOL/L — SIGNIFICANT CHANGE UP (ref 3.5–5.3)
POTASSIUM SERPL-SCNC: 4.5 MMOL/L — SIGNIFICANT CHANGE UP (ref 3.5–5.3)
POTASSIUM SERPL-SCNC: 4.5 MMOL/L — SIGNIFICANT CHANGE UP (ref 3.5–5.3)
PROT SERPL-MCNC: 7.5 G/DL — SIGNIFICANT CHANGE UP (ref 6.6–8.7)
PROT SERPL-MCNC: 7.5 G/DL — SIGNIFICANT CHANGE UP (ref 6.6–8.7)
PROT UR-MCNC: 100 MG/DL
PROT UR-MCNC: 100 MG/DL
RBC # BLD: 4.62 M/UL — SIGNIFICANT CHANGE UP (ref 4.2–5.8)
RBC # BLD: 4.62 M/UL — SIGNIFICANT CHANGE UP (ref 4.2–5.8)
RBC # FLD: 12.9 % — SIGNIFICANT CHANGE UP (ref 10.3–14.5)
RBC # FLD: 12.9 % — SIGNIFICANT CHANGE UP (ref 10.3–14.5)
RBC CASTS # UR COMP ASSIST: >1900 /HPF — HIGH (ref 0–4)
RBC CASTS # UR COMP ASSIST: >1900 /HPF — HIGH (ref 0–4)
SODIUM SERPL-SCNC: 142 MMOL/L — SIGNIFICANT CHANGE UP (ref 135–145)
SODIUM SERPL-SCNC: 142 MMOL/L — SIGNIFICANT CHANGE UP (ref 135–145)
SP GR SPEC: 1.02 — SIGNIFICANT CHANGE UP (ref 1–1.03)
SP GR SPEC: 1.02 — SIGNIFICANT CHANGE UP (ref 1–1.03)
SQUAMOUS # UR AUTO: 1 /HPF — SIGNIFICANT CHANGE UP (ref 0–5)
SQUAMOUS # UR AUTO: 1 /HPF — SIGNIFICANT CHANGE UP (ref 0–5)
UROBILINOGEN FLD QL: 0.2 MG/DL — SIGNIFICANT CHANGE UP (ref 0.2–1)
UROBILINOGEN FLD QL: 0.2 MG/DL — SIGNIFICANT CHANGE UP (ref 0.2–1)
WBC # BLD: 9.73 K/UL — SIGNIFICANT CHANGE UP (ref 3.8–10.5)
WBC # BLD: 9.73 K/UL — SIGNIFICANT CHANGE UP (ref 3.8–10.5)
WBC # FLD AUTO: 9.73 K/UL — SIGNIFICANT CHANGE UP (ref 3.8–10.5)
WBC # FLD AUTO: 9.73 K/UL — SIGNIFICANT CHANGE UP (ref 3.8–10.5)
WBC UR QL: 19 /HPF — HIGH (ref 0–5)
WBC UR QL: 19 /HPF — HIGH (ref 0–5)

## 2024-01-05 PROCEDURE — 93010 ELECTROCARDIOGRAM REPORT: CPT

## 2024-01-05 PROCEDURE — 99285 EMERGENCY DEPT VISIT HI MDM: CPT

## 2024-01-05 PROCEDURE — 71045 X-RAY EXAM CHEST 1 VIEW: CPT | Mod: 26

## 2024-01-05 RX ORDER — LISINOPRIL 2.5 MG/1
1 TABLET ORAL
Qty: 14 | Refills: 0
Start: 2024-01-05 | End: 2024-01-18

## 2024-01-05 RX ORDER — EMPAGLIFLOZIN 10 MG/1
1 TABLET, FILM COATED ORAL
Qty: 14 | Refills: 0
Start: 2024-01-05 | End: 2024-01-18

## 2024-01-05 RX ORDER — GABAPENTIN 400 MG/1
1 CAPSULE ORAL
Qty: 42 | Refills: 0
Start: 2024-01-05 | End: 2024-01-18

## 2024-01-05 RX ORDER — SPIRONOLACTONE 25 MG/1
1 TABLET, FILM COATED ORAL
Qty: 14 | Refills: 0
Start: 2024-01-05 | End: 2024-01-18

## 2024-01-05 RX ORDER — SACUBITRIL AND VALSARTAN 24; 26 MG/1; MG/1
1 TABLET, FILM COATED ORAL
Qty: 14 | Refills: 0
Start: 2024-01-05 | End: 2024-01-18

## 2024-01-05 RX ORDER — FUROSEMIDE 40 MG
80 TABLET ORAL ONCE
Refills: 0 | Status: COMPLETED | OUTPATIENT
Start: 2024-01-05 | End: 2024-01-05

## 2024-01-05 RX ORDER — FUROSEMIDE 40 MG
1 TABLET ORAL
Qty: 14 | Refills: 0
Start: 2024-01-05 | End: 2024-01-18

## 2024-01-05 RX ORDER — ATORVASTATIN CALCIUM 80 MG/1
1 TABLET, FILM COATED ORAL
Qty: 1 | Refills: 0
Start: 2024-01-05 | End: 2024-01-18

## 2024-01-05 RX ORDER — ASPIRIN/CALCIUM CARB/MAGNESIUM 324 MG
1 TABLET ORAL
Qty: 14 | Refills: 0
Start: 2024-01-05 | End: 2024-01-18

## 2024-01-05 RX ADMIN — Medication 80 MILLIGRAM(S): at 22:49

## 2024-01-05 NOTE — ED PROVIDER NOTE - ATTENDING CONTRIBUTION TO CARE
I personally saw the patient with the resident, and completed the key components of the history and physical exam. I then discussed the management plan with the resident.    78-year-old male with past medical history pericardial effusion status post window, colon cancer/kidney cancer status post resection, BPH status post TURP who presents for hematuria.  Patient also having a cough for 7 to 10 days productive of yellow phlegm.  No fevers or shortness of breath.  Patient ran out of his medications for the last 3 days and due to lapse in health insurance could not get the medications filled.      NAD, well-appearing, RRR, lungs CTA B/L, no retractions, ABD soft, nontender, nondistended, no CVA tenderness. 2+ pitting lower extremity edema.      Patient presenting with heart failure, IV Lasix, patient diuresed well, no UTI.  Patient feeling better, prescription sent to [facility],  patient and daughter comfortable with discharge at this time.

## 2024-01-05 NOTE — ED PROVIDER NOTE - NSFOLLOWUPINSTRUCTIONS_ED_ALL_ED_FT
1) Please follow-up with your primary care doctor and cardiologist in the next 5-7 days.  Please call tomorrow for an appointment.  If you cannot follow-up with your primary care doctor and cardiologist please return to the ED for any urgent issues.  2) You were given a copy of the tests performed today.  Please bring the results with you and review them with your primary care doctor and cardiologist.  3) If you have any worsening of symptoms or any other concerns please return to the ED immediately.  4) Please continue taking your home medications as directed.     Congestive Heart Failure (CHF)    Congestive heart failure is a chronic condition in which the heart has trouble pumping blood. In some cases of heart failure, fluid may back up into your lungs or you may have swelling (edema) in your lower legs. There are many causes of heart failure including high blood pressure, coronary artery disease, abnormal heart valves, heart muscle disease, lung disease, diabetes, etc. Symptoms include shortness of breath with activity or when lying flat, cough, swelling of the legs, fatigue, or increased urination during the night.     Treatment is aimed at managing the symptoms of heart failure and may include lifestyle changes, medications, or surgical procedures. Take medicines only as directed by your health care provider and do not stop unless instructed to do so. Eat heart-healthy foods with low or no trans/saturated fats, cholesterol and salt. Weigh yourself every day for early recognition of fluid accumulation.    SEEK IMMEDIATE MEDICAL CARE IF YOU HAVE ANY OF THE FOLLOWING SYMPTOMS: shortness of breath, change in mental status, chest pain, lightheadedness/dizziness/fainting, or worsening of symptoms including not being able to conduct normal physical activity.

## 2024-01-05 NOTE — ED PROVIDER NOTE - OBJECTIVE STATEMENT
78 year old male with hx of Pericardial Effusion s/p drain (12/21), HTN, Neuropathy, Colon CA/Kidney CA s/p resection, BPH s/p TURP presenting with hematuria. Pt states that he has been having productive cough for 7-10 days. Cough is productive with yellow phlegm. Denies fevers, SOB. Pt states he has not taken any of his medications in 3 days because he lost his health insurance card and could not get the medications filled. Today, pt began to have hematuria. States that he has had right nipple tenderness for months 2/2 former chemoport placement for colon ca. Pt accompanied by daughter. Cardiologist Dr. Molina. Denies SHRESTHA, fevers, chills, chest pain, shortness of breath, abdominal pain, n/v/d/c, denies burning with urination, denies LE pain, sick contacts. Quit smoking 20 years but smoked for 30 years. Patient has been traveling to and from [place]. Denies etoh use.

## 2024-01-05 NOTE — ED PROVIDER NOTE - CLINICAL SUMMARY MEDICAL DECISION MAKING FREE TEXT BOX
78 year old male with hx of Pericardial Effusion s/p drain (12/21), HTN, Neuropathy, Colon CA/Kidney CA s/p resection, BPH s/p TURP presenting with hematuria. Pt states that he has been having productive cough for 7-10 days. Cough is productive with yellow phlegm. Today, pt began to have hematuria.     Pending labs, IV meds, CXR, CT abd/pelvis. 78 year old male with hx of Pericardial Effusion s/p drain (12/21), HTN, Neuropathy, Colon CA/Kidney CA s/p resection, BPH s/p TURP presenting with hematuria. Pt states that he has been having productive cough for 7-10 days. Cough is productive with yellow phlegm. Today, pt began to have hematuria.     Pending labs, IV meds, CXR, CT abd/pelvis.    BNP elevated 5000 2/2 not taking his medications for 3 days. Furosemide given, responding appropriately.    Medications sent to [facility], his daughter at bedside stated that she will  the medications in a few hours in the AM.  Conversation had with patient regarding results of testing. Patient agrees with plan for discharge at this time. Patient agrees to comply with follow up with PCP and his cardiologist. Return to ED precautions and discharge instructions given to patient.

## 2024-01-05 NOTE — ED ADULT TRIAGE NOTE - CHIEF COMPLAINT QUOTE
Pt c/o hematuria and right nipple tenderness today, and SOB assoc. w/flu-like sxs (sneezing, productive cough) for 7-10 days.  Pt Hypertensive in triage 240/84, states has not taken his BP meds in 2 days b/c he lost his card.  EKG in triage.

## 2024-01-05 NOTE — ED PROVIDER NOTE - PHYSICAL EXAMINATION
General: Awake, alert, lying in bed in NAD  HEENT: Normocephalic, atraumatic. No scleral icterus or conjunctival injection. EOMI. Moist mucous membranes. Oropharynx clear.   Neck:. Soft and supple.  Chest: No TTP of chest wall, no soft tissue tenderness, masses.  Cardiac: RRR, Peripheral pulses 2+ and symmetric. 2+ pitting LE edema.  Resp: Lungs CTAB. No accessory muscle use  Abd: Soft, non-tender, non-distended. No guarding, rebound, or rigidity.  Back: Spine midline and non-tender.   Skin: No rashes, abrasions, or lacerations.  Neuro: AO x 4. Moves all extremities symmetrically. Motor strength and sensation grossly intact.  Psych: Appropriate mood and affect

## 2024-01-05 NOTE — ED PROVIDER NOTE - PATIENT PORTAL LINK FT
You can access the FollowMyHealth Patient Portal offered by [facility] by registering at the following website: [email or web address]. By joining [employer]’s FollowMyHealth portal, you will also be able to view your health information using other applications (apps) compatible with our system. You can access the FollowMyHealth Patient Portal offered by [facility] by registering at the following website: [email or web address]. By joining [employer]’s FollowMyHealth portal, you will also be able to view your health information using other applications (apps) compatible with our system.

## 2024-01-06 VITALS
OXYGEN SATURATION: 96 % | TEMPERATURE: 99 F | SYSTOLIC BLOOD PRESSURE: 183 MMHG | DIASTOLIC BLOOD PRESSURE: 77 MMHG | RESPIRATION RATE: 20 BRPM | HEART RATE: 63 BPM

## 2024-01-06 LAB
CULTURE RESULTS: NO GROWTH — SIGNIFICANT CHANGE UP
CULTURE RESULTS: NO GROWTH — SIGNIFICANT CHANGE UP
RAPID RVP RESULT: SIGNIFICANT CHANGE UP
RAPID RVP RESULT: SIGNIFICANT CHANGE UP
SARS-COV-2 RNA SPEC QL NAA+PROBE: SIGNIFICANT CHANGE UP
SARS-COV-2 RNA SPEC QL NAA+PROBE: SIGNIFICANT CHANGE UP
SPECIMEN SOURCE: SIGNIFICANT CHANGE UP
SPECIMEN SOURCE: SIGNIFICANT CHANGE UP

## 2024-01-06 PROCEDURE — 81001 URINALYSIS AUTO W/SCOPE: CPT

## 2024-01-06 PROCEDURE — 36415 COLL VENOUS BLD VENIPUNCTURE: CPT

## 2024-01-06 PROCEDURE — 96374 THER/PROPH/DIAG INJ IV PUSH: CPT | Mod: XU

## 2024-01-06 PROCEDURE — 71045 X-RAY EXAM CHEST 1 VIEW: CPT

## 2024-01-06 PROCEDURE — 83880 ASSAY OF NATRIURETIC PEPTIDE: CPT

## 2024-01-06 PROCEDURE — 93005 ELECTROCARDIOGRAM TRACING: CPT

## 2024-01-06 PROCEDURE — 0225U NFCT DS DNA&RNA 21 SARSCOV2: CPT

## 2024-01-06 PROCEDURE — 74177 CT ABD & PELVIS W/CONTRAST: CPT | Mod: 26,MA

## 2024-01-06 PROCEDURE — 87086 URINE CULTURE/COLONY COUNT: CPT

## 2024-01-06 PROCEDURE — 85025 COMPLETE CBC W/AUTO DIFF WBC: CPT

## 2024-01-06 PROCEDURE — 99285 EMERGENCY DEPT VISIT HI MDM: CPT | Mod: 25

## 2024-01-06 PROCEDURE — 80053 COMPREHEN METABOLIC PANEL: CPT

## 2024-01-06 PROCEDURE — T1013: CPT

## 2024-01-06 PROCEDURE — 74177 CT ABD & PELVIS W/CONTRAST: CPT | Mod: MA

## 2024-01-06 NOTE — ED ADULT NURSE NOTE - NSFALLUNIVINTERV_ED_ALL_ED
Bed/Stretcher in lowest position, wheels locked, appropriate side rails in place/Call bell, personal items and telephone in reach/Instruct patient to call for assistance before getting out of bed/chair/stretcher/Non-slip footwear applied when patient is off stretcher/[place] to call system/Physically safe environment - no spills, clutter or unnecessary equipment/Purposeful proactive rounding/Room/bathroom lighting operational, light cord in reach Bed/Stretcher in lowest position, wheels locked, appropriate side rails in place/Call bell, personal items and telephone in reach/Instruct patient to call for assistance before getting out of bed/chair/stretcher/Non-slip footwear applied when patient is off stretcher/[place] to call system/Physically safe environment - no spills, clutter or unnecessary equipment/Purposeful proactive rounding/Room/bathroom lighting operational, light cord in reach

## 2024-01-11 ENCOUNTER — TRANSCRIPTION ENCOUNTER (OUTPATIENT)
Age: 79
End: 2024-01-11

## 2024-01-11 ENCOUNTER — INPATIENT (INPATIENT)
Facility: HOSPITAL | Age: 79
LOS: 4 days | Discharge: HOME | DRG: 690 | End: 2024-01-16
Attending: FAMILY MEDICINE | Admitting: STUDENT IN AN ORGANIZED HEALTH CARE EDUCATION/TRAINING PROGRAM
Payer: COMMERCIAL

## 2024-01-11 VITALS
TEMPERATURE: 99 F | SYSTOLIC BLOOD PRESSURE: 139 MMHG | RESPIRATION RATE: 20 BRPM | OXYGEN SATURATION: 96 % | WEIGHT: 192.46 LBS | DIASTOLIC BLOOD PRESSURE: 73 MMHG | HEART RATE: 78 BPM

## 2024-01-11 DIAGNOSIS — Z98.890 OTHER SPECIFIED POSTPROCEDURAL STATES: Chronic | ICD-10-CM

## 2024-01-11 DIAGNOSIS — Z90.49 ACQUIRED ABSENCE OF OTHER SPECIFIED PARTS OF DIGESTIVE TRACT: Chronic | ICD-10-CM

## 2024-01-11 PROCEDURE — 93010 ELECTROCARDIOGRAM REPORT: CPT

## 2024-01-11 PROCEDURE — 99285 EMERGENCY DEPT VISIT HI MDM: CPT

## 2024-01-11 NOTE — ED PROVIDER NOTE - CARE PLAN
1 Principal Discharge DX:	Urinary tract infection with hematuria  Secondary Diagnosis:	ECG abnormality  Secondary Diagnosis:	Obstructive uropathy

## 2024-01-11 NOTE — ED PROVIDER NOTE - OBJECTIVE STATEMENT
78-year-old male with past medical history of kidney cancer, colon cancer status post resection, hypertension, CHF, presenting with hematuria.  Patient states that he was seen in ED several days ago for flulike symptoms, had hematuria at that time, which had resolved upon presentation to the ED.  He states that since then, he has had multiple episodes of intermittent hematuria with clots.  He states that he has been straining to urinate as well.  No fevers or chills.  No back pain.  Patient did have a CAT scan during last ED visit which showed a renal lesion.  Urine culture was negative,   Urinalysis showed gross hematuria. Patient states that he has also been feeling more weak over the last few days. No chest pain or shortness of breath.

## 2024-01-11 NOTE — ED ADULT TRIAGE NOTE - GLASGOW COMA SCALE: BEST VERBAL RESPONSE, MLM
Render In Strict Bullet Format?: No
Initiate Treatment: betamethasone, augmented 0.05 % topical cream twice a day to chest rash for 2-3 weeks. Avoid face.
Detail Level: Simple
Plan: Consider Steroid IK I40 if rash is not clear with augmented Betamethasone. ( oral prednisone makes bad feel bad)
(V5) oriented

## 2024-01-11 NOTE — ED PROVIDER NOTE - PHYSICAL EXAMINATION
Gen: NAD, AOx3  Head: NCAT  HEENT: oral mucosa moist, normal conjunctiva, oropharynx clear without exudate or erythema  Lung: CTAB, no respiratory distress, no wheezing, rales, rhonchi  CV: normal s1/s2, rrr, no murmurs, Normal perfusion, pulses 2+ throughout  Abd: soft, NTND  MSK: No edema, no visible deformities, full range of motion in all 4 extremities  Neuro: No focal neurologic deficits  Skin: No rash   Psych: normal affect   POCUS: 130cc in bladder

## 2024-01-11 NOTE — ED PROVIDER NOTE - CLINICAL SUMMARY MEDICAL DECISION MAKING FREE TEXT BOX
78-year-old male with past medical history of kidney cancer, colon cancer status postresection, hypertension, CHF, presenting with hematuria with clots and difficulty urinating.  Patient states that the symptoms started 4 days ago, presents to the ED with flulike symptoms 4 days ago, which have now resolved, but states that the hematuria has got worse.  He states that today, he has been having issues emptying his bladder, having to strain and passing large amounts of clots.  Patient also states that he is feeling weak and tired.  Denies any abdominal pain, fevers or chills.   On physical exam, patient is well-appearing no acute distress.  A point-of-care ultrasound performed, which showed a bladder  mixed echogenicity at the inferior aspect, concerning for blood products.  Labs reviewed, patient noted to have LINDEN, which is likely postobstructive in nature.  A three-way Velasquez catheter was attempted to be placed by RN, myself, as well as urology PA, however was unsuccessful.  Patient will likely require cystoscopy for Velasquez catheter placement for continuous bladder irrigation.  Patient had EKG for generalized weakness, which showed  new ST depressions, worse from previous EKG 6 days ago.   Cardiology was consulted, believes it is likely secondary to benign early repolarization, however is recommending admission for telemetry monitoring, serial troponins.  Labs reviewed, initial troponin was  37.  Plan to admit patient to medicine for further management.

## 2024-01-12 ENCOUNTER — TRANSCRIPTION ENCOUNTER (OUTPATIENT)
Age: 79
End: 2024-01-12

## 2024-01-12 DIAGNOSIS — N39.0 URINARY TRACT INFECTION, SITE NOT SPECIFIED: ICD-10-CM

## 2024-01-12 DIAGNOSIS — R31.0 GROSS HEMATURIA: ICD-10-CM

## 2024-01-12 DIAGNOSIS — R94.31 ABNORMAL ELECTROCARDIOGRAM [ECG] [EKG]: ICD-10-CM

## 2024-01-12 DIAGNOSIS — I10 ESSENTIAL (PRIMARY) HYPERTENSION: ICD-10-CM

## 2024-01-12 DIAGNOSIS — I42.8 OTHER CARDIOMYOPATHIES: ICD-10-CM

## 2024-01-12 LAB
A1C WITH ESTIMATED AVERAGE GLUCOSE RESULT: 5 % — SIGNIFICANT CHANGE UP (ref 4–5.6)
A1C WITH ESTIMATED AVERAGE GLUCOSE RESULT: 5 % — SIGNIFICANT CHANGE UP (ref 4–5.6)
ALBUMIN SERPL ELPH-MCNC: 3.6 G/DL — SIGNIFICANT CHANGE UP (ref 3.3–5.2)
ALBUMIN SERPL ELPH-MCNC: 3.6 G/DL — SIGNIFICANT CHANGE UP (ref 3.3–5.2)
ALBUMIN SERPL ELPH-MCNC: 4 G/DL — SIGNIFICANT CHANGE UP (ref 3.3–5.2)
ALBUMIN SERPL ELPH-MCNC: 4 G/DL — SIGNIFICANT CHANGE UP (ref 3.3–5.2)
ALP SERPL-CCNC: 102 U/L — SIGNIFICANT CHANGE UP (ref 40–120)
ALP SERPL-CCNC: 102 U/L — SIGNIFICANT CHANGE UP (ref 40–120)
ALP SERPL-CCNC: 111 U/L — SIGNIFICANT CHANGE UP (ref 40–120)
ALP SERPL-CCNC: 111 U/L — SIGNIFICANT CHANGE UP (ref 40–120)
ALT FLD-CCNC: 18 U/L — SIGNIFICANT CHANGE UP
ALT FLD-CCNC: 18 U/L — SIGNIFICANT CHANGE UP
ALT FLD-CCNC: 21 U/L — SIGNIFICANT CHANGE UP
ALT FLD-CCNC: 21 U/L — SIGNIFICANT CHANGE UP
ANION GAP SERPL CALC-SCNC: 14 MMOL/L — SIGNIFICANT CHANGE UP (ref 5–17)
ANION GAP SERPL CALC-SCNC: 14 MMOL/L — SIGNIFICANT CHANGE UP (ref 5–17)
ANION GAP SERPL CALC-SCNC: 15 MMOL/L — SIGNIFICANT CHANGE UP (ref 5–17)
ANION GAP SERPL CALC-SCNC: 15 MMOL/L — SIGNIFICANT CHANGE UP (ref 5–17)
APPEARANCE UR: ABNORMAL
APPEARANCE UR: ABNORMAL
APTT BLD: 34.1 SEC — SIGNIFICANT CHANGE UP (ref 24.5–35.6)
APTT BLD: 34.1 SEC — SIGNIFICANT CHANGE UP (ref 24.5–35.6)
AST SERPL-CCNC: 29 U/L — SIGNIFICANT CHANGE UP
AST SERPL-CCNC: 29 U/L — SIGNIFICANT CHANGE UP
AST SERPL-CCNC: 30 U/L — SIGNIFICANT CHANGE UP
AST SERPL-CCNC: 30 U/L — SIGNIFICANT CHANGE UP
BACTERIA # UR AUTO: ABNORMAL /HPF
BACTERIA # UR AUTO: ABNORMAL /HPF
BASOPHILS # BLD AUTO: 0.05 K/UL — SIGNIFICANT CHANGE UP (ref 0–0.2)
BASOPHILS # BLD AUTO: 0.05 K/UL — SIGNIFICANT CHANGE UP (ref 0–0.2)
BASOPHILS NFR BLD AUTO: 0.5 % — SIGNIFICANT CHANGE UP (ref 0–2)
BASOPHILS NFR BLD AUTO: 0.5 % — SIGNIFICANT CHANGE UP (ref 0–2)
BILIRUB SERPL-MCNC: 0.5 MG/DL — SIGNIFICANT CHANGE UP (ref 0.4–2)
BILIRUB SERPL-MCNC: 0.5 MG/DL — SIGNIFICANT CHANGE UP (ref 0.4–2)
BILIRUB SERPL-MCNC: 0.6 MG/DL — SIGNIFICANT CHANGE UP (ref 0.4–2)
BILIRUB SERPL-MCNC: 0.6 MG/DL — SIGNIFICANT CHANGE UP (ref 0.4–2)
BILIRUB UR-MCNC: ABNORMAL
BILIRUB UR-MCNC: ABNORMAL
BLD GP AB SCN SERPL QL: SIGNIFICANT CHANGE UP
BLD GP AB SCN SERPL QL: SIGNIFICANT CHANGE UP
BUN SERPL-MCNC: 25.9 MG/DL — HIGH (ref 8–20)
BUN SERPL-MCNC: 25.9 MG/DL — HIGH (ref 8–20)
BUN SERPL-MCNC: 30.4 MG/DL — HIGH (ref 8–20)
BUN SERPL-MCNC: 30.4 MG/DL — HIGH (ref 8–20)
CALCIUM SERPL-MCNC: 8.3 MG/DL — LOW (ref 8.4–10.5)
CALCIUM SERPL-MCNC: 8.3 MG/DL — LOW (ref 8.4–10.5)
CALCIUM SERPL-MCNC: 8.9 MG/DL — SIGNIFICANT CHANGE UP (ref 8.4–10.5)
CALCIUM SERPL-MCNC: 8.9 MG/DL — SIGNIFICANT CHANGE UP (ref 8.4–10.5)
CHLORIDE SERPL-SCNC: 103 MMOL/L — SIGNIFICANT CHANGE UP (ref 96–108)
CHLORIDE SERPL-SCNC: 103 MMOL/L — SIGNIFICANT CHANGE UP (ref 96–108)
CHLORIDE SERPL-SCNC: 104 MMOL/L — SIGNIFICANT CHANGE UP (ref 96–108)
CHLORIDE SERPL-SCNC: 104 MMOL/L — SIGNIFICANT CHANGE UP (ref 96–108)
CO2 SERPL-SCNC: 20 MMOL/L — LOW (ref 22–29)
CO2 SERPL-SCNC: 20 MMOL/L — LOW (ref 22–29)
CO2 SERPL-SCNC: 25 MMOL/L — SIGNIFICANT CHANGE UP (ref 22–29)
CO2 SERPL-SCNC: 25 MMOL/L — SIGNIFICANT CHANGE UP (ref 22–29)
COLOR SPEC: ABNORMAL
COLOR SPEC: ABNORMAL
CREAT SERPL-MCNC: 1.63 MG/DL — HIGH (ref 0.5–1.3)
CREAT SERPL-MCNC: 1.63 MG/DL — HIGH (ref 0.5–1.3)
CREAT SERPL-MCNC: 1.72 MG/DL — HIGH (ref 0.5–1.3)
CREAT SERPL-MCNC: 1.72 MG/DL — HIGH (ref 0.5–1.3)
DIFF PNL FLD: ABNORMAL
DIFF PNL FLD: ABNORMAL
EGFR: 40 ML/MIN/1.73M2 — LOW
EGFR: 40 ML/MIN/1.73M2 — LOW
EGFR: 43 ML/MIN/1.73M2 — LOW
EGFR: 43 ML/MIN/1.73M2 — LOW
EOSINOPHIL # BLD AUTO: 0.21 K/UL — SIGNIFICANT CHANGE UP (ref 0–0.5)
EOSINOPHIL # BLD AUTO: 0.21 K/UL — SIGNIFICANT CHANGE UP (ref 0–0.5)
EOSINOPHIL NFR BLD AUTO: 2 % — SIGNIFICANT CHANGE UP (ref 0–6)
EOSINOPHIL NFR BLD AUTO: 2 % — SIGNIFICANT CHANGE UP (ref 0–6)
ESTIMATED AVERAGE GLUCOSE: 97 MG/DL — SIGNIFICANT CHANGE UP (ref 68–114)
ESTIMATED AVERAGE GLUCOSE: 97 MG/DL — SIGNIFICANT CHANGE UP (ref 68–114)
GLUCOSE SERPL-MCNC: 93 MG/DL — SIGNIFICANT CHANGE UP (ref 70–99)
GLUCOSE SERPL-MCNC: 93 MG/DL — SIGNIFICANT CHANGE UP (ref 70–99)
GLUCOSE SERPL-MCNC: 99 MG/DL — SIGNIFICANT CHANGE UP (ref 70–99)
GLUCOSE SERPL-MCNC: 99 MG/DL — SIGNIFICANT CHANGE UP (ref 70–99)
GLUCOSE UR QL: NEGATIVE MG/DL — SIGNIFICANT CHANGE UP
GLUCOSE UR QL: NEGATIVE MG/DL — SIGNIFICANT CHANGE UP
HCT VFR BLD CALC: 39.9 % — SIGNIFICANT CHANGE UP (ref 39–50)
HCT VFR BLD CALC: 39.9 % — SIGNIFICANT CHANGE UP (ref 39–50)
HCT VFR BLD CALC: 40.4 % — SIGNIFICANT CHANGE UP (ref 39–50)
HCT VFR BLD CALC: 40.4 % — SIGNIFICANT CHANGE UP (ref 39–50)
HCT VFR BLD CALC: 46.8 % — SIGNIFICANT CHANGE UP (ref 39–50)
HCT VFR BLD CALC: 46.8 % — SIGNIFICANT CHANGE UP (ref 39–50)
HGB BLD-MCNC: 13.2 G/DL — SIGNIFICANT CHANGE UP (ref 13–17)
HGB BLD-MCNC: 13.2 G/DL — SIGNIFICANT CHANGE UP (ref 13–17)
HGB BLD-MCNC: 13.4 G/DL — SIGNIFICANT CHANGE UP (ref 13–17)
HGB BLD-MCNC: 13.4 G/DL — SIGNIFICANT CHANGE UP (ref 13–17)
HGB BLD-MCNC: 15.1 G/DL — SIGNIFICANT CHANGE UP (ref 13–17)
HGB BLD-MCNC: 15.1 G/DL — SIGNIFICANT CHANGE UP (ref 13–17)
IMM GRANULOCYTES NFR BLD AUTO: 1.1 % — HIGH (ref 0–0.9)
IMM GRANULOCYTES NFR BLD AUTO: 1.1 % — HIGH (ref 0–0.9)
INR BLD: 1.17 RATIO — SIGNIFICANT CHANGE UP (ref 0.85–1.18)
INR BLD: 1.17 RATIO — SIGNIFICANT CHANGE UP (ref 0.85–1.18)
KETONES UR-MCNC: ABNORMAL MG/DL
KETONES UR-MCNC: ABNORMAL MG/DL
LEUKOCYTE ESTERASE UR-ACNC: ABNORMAL
LEUKOCYTE ESTERASE UR-ACNC: ABNORMAL
LYMPHOCYTES # BLD AUTO: 1.3 K/UL — SIGNIFICANT CHANGE UP (ref 1–3.3)
LYMPHOCYTES # BLD AUTO: 1.3 K/UL — SIGNIFICANT CHANGE UP (ref 1–3.3)
LYMPHOCYTES # BLD AUTO: 12.6 % — LOW (ref 13–44)
LYMPHOCYTES # BLD AUTO: 12.6 % — LOW (ref 13–44)
MCHC RBC-ENTMCNC: 30.1 PG — SIGNIFICANT CHANGE UP (ref 27–34)
MCHC RBC-ENTMCNC: 30.1 PG — SIGNIFICANT CHANGE UP (ref 27–34)
MCHC RBC-ENTMCNC: 30.3 PG — SIGNIFICANT CHANGE UP (ref 27–34)
MCHC RBC-ENTMCNC: 30.3 PG — SIGNIFICANT CHANGE UP (ref 27–34)
MCHC RBC-ENTMCNC: 30.8 PG — SIGNIFICANT CHANGE UP (ref 27–34)
MCHC RBC-ENTMCNC: 30.8 PG — SIGNIFICANT CHANGE UP (ref 27–34)
MCHC RBC-ENTMCNC: 32.3 GM/DL — SIGNIFICANT CHANGE UP (ref 32–36)
MCHC RBC-ENTMCNC: 32.3 GM/DL — SIGNIFICANT CHANGE UP (ref 32–36)
MCHC RBC-ENTMCNC: 33.1 GM/DL — SIGNIFICANT CHANGE UP (ref 32–36)
MCHC RBC-ENTMCNC: 33.1 GM/DL — SIGNIFICANT CHANGE UP (ref 32–36)
MCHC RBC-ENTMCNC: 33.2 GM/DL — SIGNIFICANT CHANGE UP (ref 32–36)
MCHC RBC-ENTMCNC: 33.2 GM/DL — SIGNIFICANT CHANGE UP (ref 32–36)
MCV RBC AUTO: 91.7 FL — SIGNIFICANT CHANGE UP (ref 80–100)
MCV RBC AUTO: 91.7 FL — SIGNIFICANT CHANGE UP (ref 80–100)
MCV RBC AUTO: 92.9 FL — SIGNIFICANT CHANGE UP (ref 80–100)
MCV RBC AUTO: 92.9 FL — SIGNIFICANT CHANGE UP (ref 80–100)
MCV RBC AUTO: 93.2 FL — SIGNIFICANT CHANGE UP (ref 80–100)
MCV RBC AUTO: 93.2 FL — SIGNIFICANT CHANGE UP (ref 80–100)
MONOCYTES # BLD AUTO: 0.79 K/UL — SIGNIFICANT CHANGE UP (ref 0–0.9)
MONOCYTES # BLD AUTO: 0.79 K/UL — SIGNIFICANT CHANGE UP (ref 0–0.9)
MONOCYTES NFR BLD AUTO: 7.7 % — SIGNIFICANT CHANGE UP (ref 2–14)
MONOCYTES NFR BLD AUTO: 7.7 % — SIGNIFICANT CHANGE UP (ref 2–14)
NEUTROPHILS # BLD AUTO: 7.86 K/UL — HIGH (ref 1.8–7.4)
NEUTROPHILS # BLD AUTO: 7.86 K/UL — HIGH (ref 1.8–7.4)
NEUTROPHILS NFR BLD AUTO: 76.1 % — SIGNIFICANT CHANGE UP (ref 43–77)
NEUTROPHILS NFR BLD AUTO: 76.1 % — SIGNIFICANT CHANGE UP (ref 43–77)
NITRITE UR-MCNC: POSITIVE
NITRITE UR-MCNC: POSITIVE
PH UR: 5 — SIGNIFICANT CHANGE UP (ref 5–8)
PH UR: 5 — SIGNIFICANT CHANGE UP (ref 5–8)
PLATELET # BLD AUTO: 285 K/UL — SIGNIFICANT CHANGE UP (ref 150–400)
PLATELET # BLD AUTO: 285 K/UL — SIGNIFICANT CHANGE UP (ref 150–400)
PLATELET # BLD AUTO: 304 K/UL — SIGNIFICANT CHANGE UP (ref 150–400)
PLATELET # BLD AUTO: 304 K/UL — SIGNIFICANT CHANGE UP (ref 150–400)
PLATELET # BLD AUTO: 326 K/UL — SIGNIFICANT CHANGE UP (ref 150–400)
PLATELET # BLD AUTO: 326 K/UL — SIGNIFICANT CHANGE UP (ref 150–400)
POTASSIUM SERPL-MCNC: 4.6 MMOL/L — SIGNIFICANT CHANGE UP (ref 3.5–5.3)
POTASSIUM SERPL-MCNC: 4.6 MMOL/L — SIGNIFICANT CHANGE UP (ref 3.5–5.3)
POTASSIUM SERPL-MCNC: 4.8 MMOL/L — SIGNIFICANT CHANGE UP (ref 3.5–5.3)
POTASSIUM SERPL-MCNC: 4.8 MMOL/L — SIGNIFICANT CHANGE UP (ref 3.5–5.3)
POTASSIUM SERPL-SCNC: 4.6 MMOL/L — SIGNIFICANT CHANGE UP (ref 3.5–5.3)
POTASSIUM SERPL-SCNC: 4.6 MMOL/L — SIGNIFICANT CHANGE UP (ref 3.5–5.3)
POTASSIUM SERPL-SCNC: 4.8 MMOL/L — SIGNIFICANT CHANGE UP (ref 3.5–5.3)
POTASSIUM SERPL-SCNC: 4.8 MMOL/L — SIGNIFICANT CHANGE UP (ref 3.5–5.3)
PROT SERPL-MCNC: 7.1 G/DL — SIGNIFICANT CHANGE UP (ref 6.6–8.7)
PROT SERPL-MCNC: 7.1 G/DL — SIGNIFICANT CHANGE UP (ref 6.6–8.7)
PROT SERPL-MCNC: 7.5 G/DL — SIGNIFICANT CHANGE UP (ref 6.6–8.7)
PROT SERPL-MCNC: 7.5 G/DL — SIGNIFICANT CHANGE UP (ref 6.6–8.7)
PROT UR-MCNC: SIGNIFICANT CHANGE UP MG/DL
PROT UR-MCNC: SIGNIFICANT CHANGE UP MG/DL
PROTHROM AB SERPL-ACNC: 12.9 SEC — SIGNIFICANT CHANGE UP (ref 9.5–13)
PROTHROM AB SERPL-ACNC: 12.9 SEC — SIGNIFICANT CHANGE UP (ref 9.5–13)
RBC # BLD: 4.35 M/UL — SIGNIFICANT CHANGE UP (ref 4.2–5.8)
RBC # BLD: 5.02 M/UL — SIGNIFICANT CHANGE UP (ref 4.2–5.8)
RBC # BLD: 5.02 M/UL — SIGNIFICANT CHANGE UP (ref 4.2–5.8)
RBC # FLD: 12.9 % — SIGNIFICANT CHANGE UP (ref 10.3–14.5)
RBC # FLD: 13 % — SIGNIFICANT CHANGE UP (ref 10.3–14.5)
RBC # FLD: 13 % — SIGNIFICANT CHANGE UP (ref 10.3–14.5)
RBC CASTS # UR COMP ASSIST: 500 /HPF — HIGH (ref 0–4)
RBC CASTS # UR COMP ASSIST: 500 /HPF — HIGH (ref 0–4)
SODIUM SERPL-SCNC: 137 MMOL/L — SIGNIFICANT CHANGE UP (ref 135–145)
SODIUM SERPL-SCNC: 137 MMOL/L — SIGNIFICANT CHANGE UP (ref 135–145)
SODIUM SERPL-SCNC: 144 MMOL/L — SIGNIFICANT CHANGE UP (ref 135–145)
SODIUM SERPL-SCNC: 144 MMOL/L — SIGNIFICANT CHANGE UP (ref 135–145)
SP GR SPEC: >1.03 — HIGH (ref 1–1.03)
SP GR SPEC: >1.03 — HIGH (ref 1–1.03)
SQUAMOUS # UR AUTO: 3 /HPF — SIGNIFICANT CHANGE UP (ref 0–5)
SQUAMOUS # UR AUTO: 3 /HPF — SIGNIFICANT CHANGE UP (ref 0–5)
TROPONIN T, HIGH SENSITIVITY RESULT: 27 NG/L — SIGNIFICANT CHANGE UP (ref 0–51)
TROPONIN T, HIGH SENSITIVITY RESULT: 27 NG/L — SIGNIFICANT CHANGE UP (ref 0–51)
UROBILINOGEN FLD QL: 1 MG/DL — SIGNIFICANT CHANGE UP (ref 0.2–1)
UROBILINOGEN FLD QL: 1 MG/DL — SIGNIFICANT CHANGE UP (ref 0.2–1)
WBC # BLD: 10.32 K/UL — SIGNIFICANT CHANGE UP (ref 3.8–10.5)
WBC # BLD: 10.32 K/UL — SIGNIFICANT CHANGE UP (ref 3.8–10.5)
WBC # BLD: 13.5 K/UL — HIGH (ref 3.8–10.5)
WBC # BLD: 13.5 K/UL — HIGH (ref 3.8–10.5)
WBC # BLD: 15.28 K/UL — HIGH (ref 3.8–10.5)
WBC # BLD: 15.28 K/UL — HIGH (ref 3.8–10.5)
WBC # FLD AUTO: 10.32 K/UL — SIGNIFICANT CHANGE UP (ref 3.8–10.5)
WBC # FLD AUTO: 10.32 K/UL — SIGNIFICANT CHANGE UP (ref 3.8–10.5)
WBC # FLD AUTO: 13.5 K/UL — HIGH (ref 3.8–10.5)
WBC # FLD AUTO: 13.5 K/UL — HIGH (ref 3.8–10.5)
WBC # FLD AUTO: 15.28 K/UL — HIGH (ref 3.8–10.5)
WBC # FLD AUTO: 15.28 K/UL — HIGH (ref 3.8–10.5)
WBC UR QL: 20 /HPF — HIGH (ref 0–5)
WBC UR QL: 20 /HPF — HIGH (ref 0–5)

## 2024-01-12 PROCEDURE — 71045 X-RAY EXAM CHEST 1 VIEW: CPT | Mod: 26

## 2024-01-12 PROCEDURE — 99497 ADVNCD CARE PLAN 30 MIN: CPT | Mod: 25

## 2024-01-12 PROCEDURE — 93010 ELECTROCARDIOGRAM REPORT: CPT

## 2024-01-12 PROCEDURE — 99222 1ST HOSP IP/OBS MODERATE 55: CPT

## 2024-01-12 PROCEDURE — 51102 DRAIN BL W/CATH INSERTION: CPT

## 2024-01-12 PROCEDURE — 99223 1ST HOSP IP/OBS HIGH 75: CPT | Mod: 25

## 2024-01-12 DEVICE — IMPLANTABLE DEVICE: Type: IMPLANTABLE DEVICE | Status: FUNCTIONAL

## 2024-01-12 DEVICE — URETERAL CATH OPEN END FLEXI-TIP 5FR .038" X 70CM: Type: IMPLANTABLE DEVICE | Status: FUNCTIONAL

## 2024-01-12 DEVICE — GWIRE AMPLATZ SUPER STIFF .035INX145CM: Type: IMPLANTABLE DEVICE | Status: FUNCTIONAL

## 2024-01-12 DEVICE — GUIDEWIRE SENSOR DUAL-FLEX NITINOL STRAIGHT .035" X 150CM: Type: IMPLANTABLE DEVICE | Status: FUNCTIONAL

## 2024-01-12 DEVICE — URETHRAL DILATOR SET 8-24FR 37CM: Type: IMPLANTABLE DEVICE | Status: FUNCTIONAL

## 2024-01-12 RX ORDER — TAMSULOSIN HYDROCHLORIDE 0.4 MG/1
0.8 CAPSULE ORAL AT BEDTIME
Refills: 0 | Status: DISCONTINUED | OUTPATIENT
Start: 2024-01-12 | End: 2024-01-12

## 2024-01-12 RX ORDER — PANTOPRAZOLE SODIUM 20 MG/1
40 TABLET, DELAYED RELEASE ORAL DAILY
Refills: 0 | Status: DISCONTINUED | OUTPATIENT
Start: 2024-01-12 | End: 2024-01-12

## 2024-01-12 RX ORDER — LANOLIN ALCOHOL/MO/W.PET/CERES
3 CREAM (GRAM) TOPICAL AT BEDTIME
Refills: 0 | Status: DISCONTINUED | OUTPATIENT
Start: 2024-01-12 | End: 2024-01-16

## 2024-01-12 RX ORDER — CEFTRIAXONE 500 MG/1
1000 INJECTION, POWDER, FOR SOLUTION INTRAMUSCULAR; INTRAVENOUS EVERY 24 HOURS
Refills: 0 | Status: COMPLETED | OUTPATIENT
Start: 2024-01-12 | End: 2024-01-14

## 2024-01-12 RX ORDER — ASPIRIN/CALCIUM CARB/MAGNESIUM 324 MG
81 TABLET ORAL DAILY
Refills: 0 | Status: DISCONTINUED | OUTPATIENT
Start: 2024-01-12 | End: 2024-01-12

## 2024-01-12 RX ORDER — CARVEDILOL PHOSPHATE 80 MG/1
1 CAPSULE, EXTENDED RELEASE ORAL
Qty: 0 | Refills: 0 | DISCHARGE

## 2024-01-12 RX ORDER — ONDANSETRON 8 MG/1
4 TABLET, FILM COATED ORAL ONCE
Refills: 0 | Status: DISCONTINUED | OUTPATIENT
Start: 2024-01-12 | End: 2024-01-12

## 2024-01-12 RX ORDER — TAMSULOSIN HYDROCHLORIDE 0.4 MG/1
0.8 CAPSULE ORAL AT BEDTIME
Refills: 0 | Status: DISCONTINUED | OUTPATIENT
Start: 2024-01-12 | End: 2024-01-16

## 2024-01-12 RX ORDER — ACETAMINOPHEN 500 MG
650 TABLET ORAL EVERY 6 HOURS
Refills: 0 | Status: DISCONTINUED | OUTPATIENT
Start: 2024-01-12 | End: 2024-01-12

## 2024-01-12 RX ORDER — MORPHINE SULFATE 50 MG/1
2 CAPSULE, EXTENDED RELEASE ORAL ONCE
Refills: 0 | Status: DISCONTINUED | OUTPATIENT
Start: 2024-01-12 | End: 2024-01-12

## 2024-01-12 RX ORDER — SACUBITRIL AND VALSARTAN 24; 26 MG/1; MG/1
1 TABLET, FILM COATED ORAL
Refills: 0 | Status: DISCONTINUED | OUTPATIENT
Start: 2024-01-12 | End: 2024-01-12

## 2024-01-12 RX ORDER — SACUBITRIL AND VALSARTAN 24; 26 MG/1; MG/1
1 TABLET, FILM COATED ORAL
Refills: 0 | Status: DISCONTINUED | OUTPATIENT
Start: 2024-01-12 | End: 2024-01-16

## 2024-01-12 RX ORDER — GABAPENTIN 400 MG/1
1 CAPSULE ORAL
Qty: 0 | Refills: 0 | DISCHARGE

## 2024-01-12 RX ORDER — ONDANSETRON 8 MG/1
4 TABLET, FILM COATED ORAL EVERY 8 HOURS
Refills: 0 | Status: DISCONTINUED | OUTPATIENT
Start: 2024-01-12 | End: 2024-01-12

## 2024-01-12 RX ORDER — CEFTRIAXONE 500 MG/1
1000 INJECTION, POWDER, FOR SOLUTION INTRAMUSCULAR; INTRAVENOUS EVERY 24 HOURS
Refills: 0 | Status: CANCELLED | OUTPATIENT
Start: 2024-01-13 | End: 2024-01-12

## 2024-01-12 RX ORDER — INFLUENZA VIRUS VACCINE 15; 15; 15; 15 UG/.5ML; UG/.5ML; UG/.5ML; UG/.5ML
0.7 SUSPENSION INTRAMUSCULAR ONCE
Refills: 0 | Status: DISCONTINUED | OUTPATIENT
Start: 2024-01-12 | End: 2024-01-16

## 2024-01-12 RX ORDER — ATORVASTATIN CALCIUM 80 MG/1
40 TABLET, FILM COATED ORAL AT BEDTIME
Refills: 0 | Status: DISCONTINUED | OUTPATIENT
Start: 2024-01-12 | End: 2024-01-12

## 2024-01-12 RX ORDER — CEFTRIAXONE 500 MG/1
1000 INJECTION, POWDER, FOR SOLUTION INTRAMUSCULAR; INTRAVENOUS ONCE
Refills: 0 | Status: DISCONTINUED | OUTPATIENT
Start: 2024-01-12 | End: 2024-01-12

## 2024-01-12 RX ORDER — LANOLIN ALCOHOL/MO/W.PET/CERES
3 CREAM (GRAM) TOPICAL AT BEDTIME
Refills: 0 | Status: DISCONTINUED | OUTPATIENT
Start: 2024-01-12 | End: 2024-01-12

## 2024-01-12 RX ORDER — CARVEDILOL PHOSPHATE 80 MG/1
12.5 CAPSULE, EXTENDED RELEASE ORAL EVERY 12 HOURS
Refills: 0 | Status: DISCONTINUED | OUTPATIENT
Start: 2024-01-12 | End: 2024-01-12

## 2024-01-12 RX ORDER — ACETAMINOPHEN 500 MG
650 TABLET ORAL EVERY 6 HOURS
Refills: 0 | Status: DISCONTINUED | OUTPATIENT
Start: 2024-01-12 | End: 2024-01-16

## 2024-01-12 RX ORDER — CARVEDILOL PHOSPHATE 80 MG/1
12.5 CAPSULE, EXTENDED RELEASE ORAL EVERY 12 HOURS
Refills: 0 | Status: DISCONTINUED | OUTPATIENT
Start: 2024-01-12 | End: 2024-01-16

## 2024-01-12 RX ORDER — CEFTRIAXONE 500 MG/1
1000 INJECTION, POWDER, FOR SOLUTION INTRAMUSCULAR; INTRAVENOUS ONCE
Refills: 0 | Status: COMPLETED | OUTPATIENT
Start: 2024-01-12 | End: 2024-01-12

## 2024-01-12 RX ORDER — ONDANSETRON 8 MG/1
4 TABLET, FILM COATED ORAL EVERY 8 HOURS
Refills: 0 | Status: DISCONTINUED | OUTPATIENT
Start: 2024-01-12 | End: 2024-01-16

## 2024-01-12 RX ORDER — SODIUM CHLORIDE 9 MG/ML
1000 INJECTION, SOLUTION INTRAVENOUS
Refills: 0 | Status: DISCONTINUED | OUTPATIENT
Start: 2024-01-12 | End: 2024-01-12

## 2024-01-12 RX ORDER — ACETAMINOPHEN 500 MG
1000 TABLET ORAL ONCE
Refills: 0 | Status: COMPLETED | OUTPATIENT
Start: 2024-01-12 | End: 2024-01-12

## 2024-01-12 RX ORDER — ATORVASTATIN CALCIUM 80 MG/1
40 TABLET, FILM COATED ORAL AT BEDTIME
Refills: 0 | Status: DISCONTINUED | OUTPATIENT
Start: 2024-01-12 | End: 2024-01-16

## 2024-01-12 RX ORDER — FENTANYL CITRATE 50 UG/ML
50 INJECTION INTRAVENOUS
Refills: 0 | Status: DISCONTINUED | OUTPATIENT
Start: 2024-01-12 | End: 2024-01-12

## 2024-01-12 RX ADMIN — CEFTRIAXONE 1000 MILLIGRAM(S): 500 INJECTION, POWDER, FOR SOLUTION INTRAMUSCULAR; INTRAVENOUS at 02:40

## 2024-01-12 RX ADMIN — FENTANYL CITRATE 50 MICROGRAM(S): 50 INJECTION INTRAVENOUS at 13:03

## 2024-01-12 RX ADMIN — MORPHINE SULFATE 2 MILLIGRAM(S): 50 CAPSULE, EXTENDED RELEASE ORAL at 08:52

## 2024-01-12 RX ADMIN — CEFTRIAXONE 1000 MILLIGRAM(S): 500 INJECTION, POWDER, FOR SOLUTION INTRAMUSCULAR; INTRAVENOUS at 21:50

## 2024-01-12 RX ADMIN — CARVEDILOL PHOSPHATE 12.5 MILLIGRAM(S): 80 CAPSULE, EXTENDED RELEASE ORAL at 18:33

## 2024-01-12 RX ADMIN — FENTANYL CITRATE 50 MICROGRAM(S): 50 INJECTION INTRAVENOUS at 14:16

## 2024-01-12 RX ADMIN — TAMSULOSIN HYDROCHLORIDE 0.8 MILLIGRAM(S): 0.4 CAPSULE ORAL at 21:49

## 2024-01-12 RX ADMIN — SACUBITRIL AND VALSARTAN 1 TABLET(S): 24; 26 TABLET, FILM COATED ORAL at 19:53

## 2024-01-12 RX ADMIN — FENTANYL CITRATE 50 MICROGRAM(S): 50 INJECTION INTRAVENOUS at 12:44

## 2024-01-12 RX ADMIN — ATORVASTATIN CALCIUM 40 MILLIGRAM(S): 80 TABLET, FILM COATED ORAL at 21:50

## 2024-01-12 RX ADMIN — Medication 400 MILLIGRAM(S): at 18:34

## 2024-01-12 NOTE — PATIENT PROFILE ADULT - PATIENT/CAREGIVER OFFERED  INTERPRETER SERVICES
yes Risks/benefits discussed with patient/surrogate/Vaccine Information Sheet (VIS) provided-VIS date: 8/6/21

## 2024-01-12 NOTE — CONSULT NOTE ADULT - PROVIDER SPECIALTY LIST ADULT
Patient called  Patient needs PPD done for work  I scheduled her appointments for 9/21 and 9/23 at 1:115pm for a nurse visit  Is this okay?
Urology
Cardiology

## 2024-01-12 NOTE — CONSULT NOTE ADULT - SUBJECTIVE AND OBJECTIVE BOX
[facility]                                              CARDIOLOGY AT [place] [address]                                             Telephone: [phone]. Fax:[phone]                                                       CARDIOLOGY CONSULTATION NOTE                                                                                             History obtained by: Patient and medical record  Community Cardiologist: [facility]   obtained: Yes [  ] No [  ]  Reason for Consultation: abnormal ECG  Available out pt records reviewed: Yes [x] No [  ]    Chief complaint:  bloody urine    HPI: Patient is a 79yo M w/ PMHx of HTN, colon CA (sp chemo 3 years ago), Pericardial effusion in December 2021 s/p window and drain, NICM, LVEF 25%, presents to [facility] for bloody urine output.  Patient states he was seen in same EDU several days ago for flulike symptoms, had hematuria earlier at home as well, but it resolved upon presentation to [facility].  States has had multiple episodes of intermittent hematuria with clots today and previous days.  Cardiology called for abnormal ECG findings.  Patient denies chest pain, dyspnea, diaphoresis, palpitations, orthopnea, fever, chills, back pain or leg edema.  ECG: NSR w/ early repol and minimal ST segment changes in anterior leads (similar to prior)    CARDIAC TESTING   ECHO: Summary:   1. Technically difficult study.   2. Endocardial visualization was enhanced with intravenous echo contrast.   3. Left ventricular ejection fraction, by visual estimation, is 20 to 25%.   4. Severely decreased global left ventricular systolic function.   5. Multiple left ventricular regional wall motion abnormalities exist. See wall motion findings.   6. Spectral Doppler shows pseudonormal pattern of left ventricular myocardial filling (Grade II diastolic dysfunction).   7. Moderately reduced RV systolic function.   8. Mild to moderate mitral valve regurgitation.   9. Mild tricuspid regurgitation.  10. Moderate aortic regurgitation.  11. Dilatation of the aortic root and ascending aorta.  12. Estimated pulmonary artery systolic pressure is 61.1 mmHg assuming a right atrial pressure of 3 mmHg, which is consistent with severe pulmonary hypertension.  13. In comparison to TTE 12/20/2021 the VEF is now severely reduced.  14. Results d/w Dr. Gordon.  Judson Swain MD Electronically signed on 10/25/2022    CATH: Procedures Performed   Procedures:  1.    Ultrasound Guided Access   2.    Arterial Access - Right Radial   3.    Venous Access - Right Femoral   4.    RHC   5.    Left Heart Cath   6.    Diagnostic Coronary Angiography   Indications:   Congestive heart failure, acute systolic   Lab Visit Indication:      LV dysfunction   Diagnostic Conclusions:   Luminal coronary irregularities.  Overall NICM.  Continue GDMT for CHF.  Acute complication:    No complications   Hemodynamic: Hemodynamic Impressions   General Impressions: Hemodynamic assessment demonstrates normal pulmonary capillary wedge  pressure, mildly elevated LVEDP, normal cardiac output.      PAST MEDICAL HISTORY  HTN (hypertension)  Neuropathy  Pericardial effusion  Colon cancer  Cancer of kidney    PAST SURGICAL HISTORY  S/P colon resection  S/P pericardial surgery    SOCIAL HISTORY:  Denies smoking/alcohol/drugs    FAMILY HISTORY: FHx: stomach cancer (Father)    Family History of Cardiovascular Disease:  Yes [  ] No [  ]  Coronary Artery Disease in first degree relative: Yes [  ] No [  ]  Sudden Cardiac Death in First degree relative: Yes [  ] No [  ]    HOME MEDICATIONS:  carvedilol 6.25 mg oral tablet: 1 tab(s) orally 2 times a day (11 Dec 2021 21:32)  gabapentin 600 mg oral tablet: 1 tab(s) orally 2 times a day (11 Dec 2021 21:32)  furosemide 40 mg oral tablet: 1 tab(s) orally once a day  Aspirin 81 mg oral tablet, chewable: 1 tab(s) chewed once a day  spironolactone 25 mg oral tablet: 1 tab(s) orally once a day  Atorvastatin 40 mg oral tablet: 1 tab(s) orally once (at bedtime)  Jardiance 10 mg oral tablet: 1 tab(s) orally once a day  Entresto 49 mg-51 mg oral tablet: 1 tab(s) orally once a day  Tamsulosin 0.4 mg oral capsule: 1 cap(s) orally once a day (at bedtime)  Albuterol 90 mcg/inh inhalation aerosol: 2 puff(s) inhaled every 6 hours     ALLERGIES: penicillin (Rash)    REVIEW OF SYMPTOMS:   CONSTITUTIONAL: No fever, no chills, no weight loss, no weight gain, no fatigue   ENMT:  No vertigo; No sinus or throat pain  NECK: No pain or stiffness  CARDIOVASCULAR: No chest pain, no dyspnea, no syncope/presyncope, no palpitations, no dizziness, no Orthopnea, no Paroxsymal nocturnal dyspnea  RESPIRATORY: no Shortness of breath, no cough, no wheezing  : + hematuria   GI: no nausea, no diarrhea, no constipation, no abdominal pain   NEURO: No headache, no slurred speech   MUSCULOSKELETAL: No joint pain or swelling  PSYCH: No agitation  ALL OTHER REVIEW OF SYSTEMS ARE NEGATIVE.    VITAL SIGNS:  T(C): 36.8 (01-11-24 @ 23:23), Max: 37.3 (01-11-24 @ 20:57)  T(F): 98.2 (01-11-24 @ 23:23), Max: 99.2 (01-11-24 @ 20:57)  HR: 78 (01-11-24 @ 23:23) (78 - 78)  BP: 152/79 (01-11-24 @ 23:23) (139/73 - 152/79)  RR: 19 (01-11-24 @ 23:23) (19 - 20)  SpO2: 96% (01-11-24 @ 23:23) (96% - 96%)    INTAKE AND OUTPUT:     PHYSICAL EXAM:  Constitutional: Comfortable, no acute distress   HEENT: Atraumatic, neck is supple, no JVD  CNS: A&Ox3. No focal deficits.   Respiratory: CTAB, unlabored   Cardiovascular: RRR, S1S2, no murmur or rubs  Gastrointestinal: Soft, non-tender   Extremities: 2+ Peripheral Pulses, no cyanosis, or edema  Psychiatric: Calm   Skin: Warm and dry, no ulcers on extremities     LABS:       INTERPRETATION OF TELEMETRY: SR no ectopy  ECG: NSR, LVH, early repol, slight ST depressions in V3-V5 (old)    Prior ECG: Yes [x] No [  ]    RADIOLOGY & ADDITIONAL STUDIES:    X-ray:  Pen                                              [facility]                                              CARDIOLOGY AT [place] [address]                                             Telephone: [phone]. Fax:[phone]                                                       CARDIOLOGY CONSULTATION NOTE                                                                                             History obtained by: Patient and medical record  Community Cardiologist: [facility]   obtained: Yes [  ] No [  ]  Reason for Consultation: abnormal ECG  Available out pt records reviewed: Yes [x] No [  ]    Chief complaint:  bloody urine    HPI: Patient is a 77yo M w/ PMHx of HTN, colon CA (sp chemo 3 years ago), Pericardial effusion in December 2021 s/p window and drain, NICM, LVEF 25%, presents to [facility] for bloody urine output.  Patient states he was seen in same EDU several days ago for flulike symptoms, had hematuria earlier at home as well, but it resolved upon presentation to [facility].  States has had multiple episodes of intermittent hematuria with clots today and previous days.  Cardiology called for abnormal ECG findings.  Patient denies chest pain, dyspnea, diaphoresis, palpitations, orthopnea, fever, chills, back pain or leg edema.  ECG: NSR w/ early repol and minimal ST segment changes in anterior leads (similar to prior)    CARDIAC TESTING   ECHO: Summary:   1. Technically difficult study.   2. Endocardial visualization was enhanced with intravenous echo contrast.   3. Left ventricular ejection fraction, by visual estimation, is 20 to 25%.   4. Severely decreased global left ventricular systolic function.   5. Multiple left ventricular regional wall motion abnormalities exist. See wall motion findings.   6. Spectral Doppler shows pseudonormal pattern of left ventricular myocardial filling (Grade II diastolic dysfunction).   7. Moderately reduced RV systolic function.   8. Mild to moderate mitral valve regurgitation.   9. Mild tricuspid regurgitation.  10. Moderate aortic regurgitation.  11. Dilatation of the aortic root and ascending aorta.  12. Estimated pulmonary artery systolic pressure is 61.1 mmHg assuming a right atrial pressure of 3 mmHg, which is consistent with severe pulmonary hypertension.  13. In comparison to TTE 12/20/2021 the VEF is now severely reduced.  14. Results d/w Dr. Gordon.  Judson Swain MD Electronically signed on 10/25/2022    CATH: Procedures Performed   Procedures:  1.    Ultrasound Guided Access   2.    Arterial Access - Right Radial   3.    Venous Access - Right Femoral   4.    RHC   5.    Left Heart Cath   6.    Diagnostic Coronary Angiography   Indications:   Congestive heart failure, acute systolic   Lab Visit Indication:      LV dysfunction   Diagnostic Conclusions:   Luminal coronary irregularities.  Overall NICM.  Continue GDMT for CHF.  Acute complication:    No complications   Hemodynamic: Hemodynamic Impressions   General Impressions: Hemodynamic assessment demonstrates normal pulmonary capillary wedge  pressure, mildly elevated LVEDP, normal cardiac output.      PAST MEDICAL HISTORY  HTN (hypertension)  Neuropathy  Pericardial effusion  Colon cancer  Cancer of kidney    PAST SURGICAL HISTORY  S/P colon resection  S/P pericardial surgery    SOCIAL HISTORY:  Denies smoking/alcohol/drugs    FAMILY HISTORY: FHx: stomach cancer (Father)    Family History of Cardiovascular Disease:  Yes [  ] No [  ]  Coronary Artery Disease in first degree relative: Yes [  ] No [  ]  Sudden Cardiac Death in First degree relative: Yes [  ] No [  ]    HOME MEDICATIONS:  carvedilol 6.25 mg oral tablet: 1 tab(s) orally 2 times a day (11 Dec 2021 21:32)  gabapentin 600 mg oral tablet: 1 tab(s) orally 2 times a day (11 Dec 2021 21:32)  furosemide 40 mg oral tablet: 1 tab(s) orally once a day  Aspirin 81 mg oral tablet, chewable: 1 tab(s) chewed once a day  spironolactone 25 mg oral tablet: 1 tab(s) orally once a day  Atorvastatin 40 mg oral tablet: 1 tab(s) orally once (at bedtime)  Jardiance 10 mg oral tablet: 1 tab(s) orally once a day  Entresto 49 mg-51 mg oral tablet: 1 tab(s) orally once a day  Tamsulosin 0.4 mg oral capsule: 1 cap(s) orally once a day (at bedtime)  Albuterol 90 mcg/inh inhalation aerosol: 2 puff(s) inhaled every 6 hours     ALLERGIES: penicillin (Rash)    REVIEW OF SYMPTOMS:   CONSTITUTIONAL: No fever, no chills, no weight loss, no weight gain, no fatigue   ENMT:  No vertigo; No sinus or throat pain  NECK: No pain or stiffness  CARDIOVASCULAR: No chest pain, no dyspnea, no syncope/presyncope, no palpitations, no dizziness, no Orthopnea, no Paroxsymal nocturnal dyspnea  RESPIRATORY: no Shortness of breath, no cough, no wheezing  : + hematuria   GI: no nausea, no diarrhea, no constipation, no abdominal pain   NEURO: No headache, no slurred speech   MUSCULOSKELETAL: No joint pain or swelling  PSYCH: No agitation  ALL OTHER REVIEW OF SYSTEMS ARE NEGATIVE.    VITAL SIGNS:  T(C): 36.8 (01-11-24 @ 23:23), Max: 37.3 (01-11-24 @ 20:57)  T(F): 98.2 (01-11-24 @ 23:23), Max: 99.2 (01-11-24 @ 20:57)  HR: 78 (01-11-24 @ 23:23) (78 - 78)  BP: 152/79 (01-11-24 @ 23:23) (139/73 - 152/79)  RR: 19 (01-11-24 @ 23:23) (19 - 20)  SpO2: 96% (01-11-24 @ 23:23) (96% - 96%)    INTAKE AND OUTPUT:     PHYSICAL EXAM:  Constitutional: Comfortable, no acute distress   HEENT: Atraumatic, neck is supple, no JVD  CNS: A&Ox3. No focal deficits.   Respiratory: CTAB, unlabored   Cardiovascular: RRR, S1S2, no murmur or rubs  Gastrointestinal: Soft, non-tender   Extremities: 2+ Peripheral Pulses, no cyanosis, or edema  Psychiatric: Calm   Skin: Warm and dry, no ulcers on extremities     LABS:       INTERPRETATION OF TELEMETRY: SR no ectopy  ECG: NSR, LVH, early repol, slight ST depressions in V3-V5 (old)    Prior ECG: Yes [x] No [  ]    RADIOLOGY & ADDITIONAL STUDIES:    X-ray:  Pen

## 2024-01-12 NOTE — H&P ADULT - HISTORY OF PRESENT ILLNESS
77 y/o M with a PMH of HTN, kidney cancer, pericardial effusion, BPH, colon CA s/p resection and chemotherapy approximately 3 years ago, CHF with a LVEF of 25 presents with hematuria. Initially presented to the ED on 1/5 with flu like symptoms and reported to have hematuria that resolved upon presentation. CT scan from visit showed a renal lesion. States has had several episodes of intermittent hematuria with clots today and yesterday.  Also reports increase strain during urination. Endorses increasing fatigue and myalgias over the last few days.  Denies fever, chills, back pain, chest pain, SOB, recent travel or sick contact.    In ED, received 1x ceftriaxone.  79 y/o M with a PMH of HTN, kidney cancer, pericardial effusion, BPH, colon CA s/p resection and chemotherapy approximately 3 years ago, CHF with a LVEF of 25 presents with hematuria. Initially presented to the ED on 1/5 with flu like symptoms and reported to have hematuria that resolved upon presentation. CT scan from visit showed a renal lesion. States has had several episodes of intermittent hematuria with clots today and yesterday.  Also reports increase strain during urination. Endorses increasing fatigue and myalgias over the last few days.  Denies fever, chills, back pain, chest pain, SOB, recent travel or sick contact.    In ED, received 1x ceftriaxone.  79 y/o M with a PMH of HTN, kidney cancer, pericardial effusion, BPH, NICM, colon CA s/p resection and chemotherapy approximately 3 years ago, CHF with a LVEF of 25 presents with hematuria. Initially presented to the ED on 1/5 with flu like symptoms and reported to have hematuria that resolved upon presentation. CT scan from visit showed a renal lesion. States has had several episodes of intermittent hematuria with clots today and yesterday.  Also reports increase strain during urination. Endorses increasing fatigue and myalgias over the last few days.  Denies fever, chills, back pain, chest pain, SOB, recent travel or sick contact.    In ED, received 1x ceftriaxone.

## 2024-01-12 NOTE — ED ADULT NURSE REASSESSMENT NOTE - NS ED NURSE REASSESS COMMENT FT1
Attempted to insert 18french 3 way baum but unable to obtain urine. Pt states he has an enlarged prostate. MD [place] at bedside. Urology consulted.

## 2024-01-12 NOTE — ASU PREOP CHECKLIST - DENTURES
Sickle Cell Crisis    AMBULATORY CARE:    A sickle cell crisis is a painful episode that occurs in people who have sickle cell anemia. It happens when sickle-shaped red blood cells (RBCs) block blood vessels. Blood and oxygen cannot get to your tissues, causing pain. A sickle cell crisis can also damage your tissues and cause organ failure, such liver or kidney failure. A sickle cell crisis can become life-threatening.    Common symptoms include the following:    Fever    Pain    Weakness or fatigue    Abdominal pain and swelling    Headaches    A painful, erect penis (priapism)    Fast heartbeats    Shortness of breath  Call your local emergency number (911 in the ) if:    You have shortness of breath or chest pain.    You are a man and have an erection that is painful and does not go away.    You lose vision in one or both eyes.  Seek care immediately if:    You feel like you cannot cope with your pain, or you feel like hurting yourself.    You have behavior changes, a seizure, or faint.    You have a fever.    You have abdominal pain, nausea, or vomiting.    You have a different or worse headache.    You have new weakness or numbness in your arm, leg, or face.    You have new pain in any part of your body.    Your urine is dark and you are urinating less than usual or not at all.    You are dizzy, lightheaded, or faint.  Call your doctor if:    You have any new signs or symptoms.    You have blood in your urine.    You are constipated or you have diarrhea.    You have changes in your vision.    You have increased fatigue.    You plan to travel by airplane or to a high [facility].    You have questions or concerns about your condition or care.  Medical alert identification: Wear medical alert jewelry or carry a card that says you have sickle cell anemia. Ask your healthcare provider where to get these items.  Medical Alert Jewelry    Treatment for a sickle cell crisis may include any of the following:    IV fluids treat dehydration and help reduce sickling of RBCs.    Oxygen helps increase oxygen levels in your blood and make it easier for you to breathe.    A blood transfusion replaces blood with RBCs that are not sickle shaped.    Surgery may be done to remove part of your spleen.  Self-care:    Medicines may be given to decrease sickling of your RBCs. You may also need medicine to prevent a bacterial infection or help you breathe more easily.    Prescription pain medicine may be given. Ask your healthcare provider how to take this medicine safely. Some prescription pain medicines contain acetaminophen. Do not take other medicines that contain acetaminophen without talking to your healthcare provider. Too much acetaminophen may cause liver damage. Prescription pain medicine may cause constipation. Ask your healthcare provider how to prevent or treat constipation.    NSAIDs, such as ibuprofen, help decrease swelling, pain, and fever. This medicine is available with or without a doctor's order. NSAIDs can cause stomach bleeding or kidney problems in certain people. If you take blood thinner medicine, always ask your healthcare provider if NSAIDs are safe for you. Always read the medicine label and follow directions.    Acetaminophen decreases pain and fever. It is available without a doctor's order. Ask how much to take and how often to take it. Follow directions. Read the labels of all other medicines you are using to see if they also contain acetaminophen, or ask your doctor or pharmacist. Acetaminophen can cause liver damage if not taken correctly.  Prevent a sickle cell crisis:    Take vitamins and minerals as directed. Folic acid may help prevent blood vessel problems that can come with sickle cell anemia. Zinc may decrease how often you have pain.    Drink liquids as directed. Dehydration can increase your risk for a sick cell crisis. Ask how much liquid to drink each day and which liquids are best for you.    Balance rest and exercise. Rest during a sickle cell crisis. Over time, increase your activity to a moderate amount. Exercise as directed. Avoid exercise or activities that can cause injury, such as football. Ask about the best exercise plan for you.    Wash your hands frequently. Handwashing can help prevent illness. Wash your hands before you prepare or eat food, and after you use the bathroom.  Handwashing      Avoid quick changes in temperature. Do not go quickly from a warm place to a cold place. Get in a pool slowly instead of jumping in. Avoid getting too hot or too cold. Dress in light clothing in the summer and warm clothing in the winter.    Do not smoke cigarettes or drink alcohol. These increase your risk for a sickle cell crisis. Nicotine and other chemicals in cigarettes and cigars can cause lung damage. Ask your healthcare provider for information if you currently smoke and need help to quit. E-cigarettes or smokeless tobacco still contain nicotine. Talk to your healthcare provider before you use these products.    Ask about vaccines you may need. Vaccines can help prevent a viral infection that may lead to a sickle cell crisis. Get a flu shot as soon as recommended each year, usually in September or October. You may need pneumonia vaccines every 5 years. Your healthcare provider can tell you if you need other vaccines, and when to get them.  Follow up with your doctor as directed: You may need ongoing screening for conditions that can develop because of sickle cell disease. Examples include kidney disease, hypertension (high blood pressure), retinopathy (eye problems), and problems with your lungs. Write down your questions so you remember to ask them during your visits.
yes

## 2024-01-12 NOTE — H&P ADULT - NSHPSOCIALHISTORY_GEN_ALL_CORE
- Lives in a house with daughter  - Denies Alcohol, illicit drug use  - Former smoker  - Retired [employer] worker

## 2024-01-12 NOTE — H&P ADULT - NSHPPHYSICALEXAM_GEN_ALL_CORE
ICU Vital Signs Last 24 Hrs  T(C): 36.8 (11 Jan 2024 23:23), Max: 37.3 (11 Jan 2024 20:57)  T(F): 98.2 (11 Jan 2024 23:23), Max: 99.2 (11 Jan 2024 20:57)  HR: 78 (11 Jan 2024 23:23) (78 - 78)  BP: 152/79 (11 Jan 2024 23:23) (139/73 - 152/79)  RR: 19 (11 Jan 2024 23:23) (19 - 20)  SpO2: 96% (11 Jan 2024 23:23) (96% - 96%)    O2 Parameters below as of 11 Jan 2024 23:23  Patient On (Oxygen Delivery Method): room air

## 2024-01-12 NOTE — H&P ADULT - ATTENDING COMMENTS
77 y/o male with a PMH of HTN, kidney cancer, pericardial effusion, BPH, NICM, colon CA s/p resection and chemotherapy approximately 3 years ago, CHF (combined) came to the ED complaining of hematuria. Patient was seen few days in the ED for similar as well as cough, treated with Lasix and discharged. 3 attempts made to put baum with no success. CT done on on prior visit showed markedly enlarged prostate. Urology on board. UA positive for UTI. ED reported abnormal ECG, seen by cardiology: ECG unchanged from prior.     Will resume home medications with exception of Lasix and Spironolactone as patient is euvolemic and LINDEN (likely obstructive). Resume as needed.   Flomax increased to 0.8mg   Monitor CBC     Rest of the plan as noted above. 79 y/o male with a PMH of HTN, kidney cancer, pericardial effusion, BPH, NICM, colon CA s/p resection and chemotherapy approximately 3 years ago, CHF (combined) came to the ED complaining of hematuria. Patient was seen few days in the ED for similar as well as cough, treated with Lasix and discharged. 3 attempts made to put baum with no success. CT done on on prior visit showed markedly enlarged prostate. Urology on board. UA positive for UTI. ED reported abnormal ECG, seen by cardiology: ECG unchanged from prior.     Will resume home medications with exception of Lasix and Spironolactone as patient is euvolemic and LINDEN (likely obstructive). Resume as needed.   Flomax increased to 0.8mg   Monitor CBC     Rest of the plan as noted above.

## 2024-01-12 NOTE — GOALS OF CARE CONVERSATION - ADVANCED CARE PLANNING - CONVERSATION DETAILS
Advance care directive discussed with patient using . Patient named his daughter (Quinton) as the health care proxy. There is no limitation in medical management. Patient is FULL CODE. Advance care directive discussed with patient using . Patient named his daughter (Ena) as the health care proxy. There is no limitation in medical management. Patient is FULL CODE.

## 2024-01-12 NOTE — CONSULT NOTE ADULT - SUBJECTIVE AND OBJECTIVE BOX
HPI: 78-year-old male with past medical history of kidney cancer, colon cancer status post resection, hypertension, CHF, presenting with hematuria.  Patient states that he was seen in ED several days ago for flulike symptoms, had hematuria at that time, which had resolved upon presentation to the ED.  He states that since then, he has had multiple episodes of intermittent hematuria with clots.  He states that he has been straining to urinate as well.  No fevers or chills.  No back pain.  Patient did have a CAT scan during last ED visit which showed a renal lesion.  Urine culture was negative,   Urinalysis showed gross hematuria. Patient states that he has also been feeling more weak over the last few days. No chest pain or shortness of breath.      PAST MEDICAL & SURGICAL HISTORY:  HTN (hypertension)      Neuropathy      Pericardial effusion  s/p drain      Colon cancer  s/p resection      Cancer of kidney      S/P colon resection      S/P pericardial surgery        MEDICATIONS  (STANDING):    MEDICATIONS  (PRN):      Allergies    penicillin (Rash)    Intolerances        SOCIAL HISTORY:    FAMILY HISTORY:  FHx: stomach cancer (Father)        Vital Signs Last 24 Hrs  T(C): 36.8 (11 Jan 2024 23:23), Max: 37.3 (11 Jan 2024 20:57)  T(F): 98.2 (11 Jan 2024 23:23), Max: 99.2 (11 Jan 2024 20:57)  HR: 78 (11 Jan 2024 23:23) (78 - 78)  BP: 152/79 (11 Jan 2024 23:23) (139/73 - 152/79)  BP(mean): --  RR: 19 (11 Jan 2024 23:23) (19 - 20)  SpO2: 96% (11 Jan 2024 23:23) (96% - 96%)    Parameters below as of 11 Jan 2024 23:23  Patient On (Oxygen Delivery Method): room air        PHYSICAL EXAM:  Constitutional: in good spirits, appears younger than stated age    Respiratory: no respiratory distress    Genitourinary: hematuria, voiding small amounts intermittently    Neurological: A&OX3        LABS:                        15.1   10.32 )-----------( 326      ( 12 Jan 2024 00:13 )             46.8     01-12    144  |  104  |  25.9<H>  ----------------------------<  99  4.8   |  25.0  |  1.72<H>    Ca    8.9      12 Jan 2024 00:13    TPro  7.5  /  Alb  4.0  /  TBili  0.6  /  DBili  x   /  AST  29  /  ALT  21  /  AlkPhos  111  01-12    PT/INR - ( 12 Jan 2024 00:13 )   PT: 12.9 sec;   INR: 1.17 ratio         PTT - ( 12 Jan 2024 00:13 )  PTT:34.1 sec  Urinalysis Basic - ( 12 Jan 2024 00:43 )    Color: Red / Appearance: Turbid / SG: >1.030 / pH: x  Gluc: x / Ketone: Trace mg/dL  / Bili: Small / Urobili: 1.0 mg/dL   Blood: x / Protein: Trace mg/dL / Nitrite: Positive   Leuk Esterase: Large / RBC: 500 /HPF / WBC 20 /HPF   Sq Epi: x / Non Sq Epi: 3 /HPF / Bacteria: Moderate /HPF        RADIOLOGY & ADDITIONAL STUDIES:

## 2024-01-12 NOTE — PROCEDURE NOTE - ADDITIONAL PROCEDURE DETAILS
Patient has a known markedly enlarged prostate seen on CT on 1/6. Chronic issues in the past with retention and hematuria. Last turp 2.5 years ago. I tried twice to place the a 24 and then 22 coude respectively, unable to pass successful
bedside cystoscopy performed, significant amount of blood and clots in urethra, false passages noted, unable to visualize bladder neck or enter bladder due to obstructed view.  pt to go to the OR for formal cystoscopy, evacuation of clots and insertion of catheter

## 2024-01-12 NOTE — PROGRESS NOTE ADULT - ASSESSMENT
77 yo male with gross hematuria, very enlarged prostate  - attempted 20Fr hematuria catheter- unsuccessful  - attempted bedside cystoscopy- urethra full of clots, false passages seen, unable to get into bladder, procedure aborted  - plan for OR this am for cystoscopy, evacuation of clots, possible fulguration of bleeding, placement of baum catheter by Dr. Henning  - pt has been NPO since yesterday am  - NPO now  - IVF  - case d/w Dr. Tiwari, Dr. Henning and Dr. Willingham 79 yo male with gross hematuria, very enlarged prostate, new LINDEN  - attempted 20Fr hematuria catheter- unsuccessful  - attempted bedside cystoscopy- urethra full of clots, false passages seen, unable to get into bladder, procedure aborted  - plan for OR this am for cystoscopy, evacuation of clots, possible fulguration of bleeding, placement of baum catheter by Dr. Henning  - pt has been NPO since yesterday am  - NPO now  - IVF  - case d/w Dr. Tiwari, Dr. Henning and Dr. Willingham  - trend renal function

## 2024-01-12 NOTE — BRIEF OPERATIVE NOTE - NSICDXBRIEFPROCEDURE_GEN_ALL_CORE_FT
PROCEDURES:  Cystoscopy, rigid 12-Jan-2024 12:23:27  Shirley Garcia  Creation, cystostomy, suprapubic, with tube insertion 12-Jan-2024 12:24:01  Shirley Garcia

## 2024-01-12 NOTE — H&P ADULT - ASSESSMENT
79 y/o M with a PMH of HTN, kidney cancer, pericardial effusion, BPH, NICM, colon CA s/p resection and chemotherapy approximately 3 years ago, CHF with a LVEF of 25 presents with hematuria found to have UTI with obstructive uropathy.    #UTI with hematuria  #Obstructive uropathy  - Admit to Medicine: Telemetry   - UA positive    - s/p 1x ceftriaxone  - c/w ceftriaxone  - f/u UCx  - tailor ABx based on cultures  - POC US Bladder highlighted mixed echogenicity at inferior aspect, concerning for blood products  - Urology/ED unable to place baum bedside  - tailor ABx based on cultures  - Plan for cystoscopy with baum placement in OR with Urology  - Urology Following    #ST Depression  - EKG in ED highlighted new ST depressions compared to previous EKG from 6 days ago  - Cardiolgy consulted in ED: rec telemetry, trend CBC, BMP, CE, CXR  - c/w to trend troponin     #LINDEN   - likely 2/2 to Obstructive uropathy  - c/w trend Cr/BUN    #CHF  #HTN  #NICM  - Strict I/O  - c/w ASA 81mg  - c/w atorvastatin   - c/w coreg  - c/w entresto  - c/w trend Electrolytes, K>4, Mg>2, Phos>3    #BPH  - c/w tamsulosin 0.4 mg at bedtime    #kidney cancer  - previous CT shows renal lesion  - f/u outpatient    #Colon cancer s/p resection and chemotherapy  - f/u outpatient      VTE ppx: SCD  GI ppx: protonix 77 y/o M with a PMH of HTN, kidney cancer, pericardial effusion, BPH, NICM, colon CA s/p resection and chemotherapy approximately 3 years ago, CHF with a LVEF of 25 presents with hematuria found to have UTI with obstructive uropathy.    #UTI with hematuria  #Obstructive uropathy  - Admit to Medicine: Telemetry   - UA positive    - s/p 1x ceftriaxone  - c/w ceftriaxone  - f/u UCx  - tailor ABx based on cultures  - POC US Bladder highlighted mixed echogenicity at inferior aspect, concerning for blood products  - Urology/ED unable to place baum bedside  - tailor ABx based on cultures  - Plan for cystoscopy with baum placement in OR with Urology  - Urology Following    #ST Depression  - EKG in ED highlighted new ST depressions compared to previous EKG from 6 days ago  - Cardiolgy consulted in ED: rec telemetry, trend CBC, BMP, CE, CXR  - c/w to trend troponin     #LINDEN   - likely 2/2 to Obstructive uropathy  - c/w trend Cr/BUN    #CHF  #HTN  #NICM  - Strict I/O  - c/w ASA 81mg  - c/w atorvastatin   - c/w coreg  - c/w entresto  - c/w trend Electrolytes, K>4, Mg>2, Phos>3    #BPH  - c/w tamsulosin 0.4 mg at bedtime    #kidney cancer  - previous CT shows renal lesion  - f/u outpatient    #Colon cancer s/p resection and chemotherapy  - f/u outpatient      VTE ppx: SCD  GI ppx: protonix 77 y/o M with a PMH of HTN, kidney cancer, pericardial effusion, BPH, NICM, colon CA s/p resection and chemotherapy approximately 3 years ago, CHF with a LVEF of 25 presents with hematuria found to have UTI with obstructive uropathy.    #UTI with hematuria  #Obstructive uropathy  - Admit to Medicine: Telemetry   - UA positive    - s/p 1x ceftriaxone  - c/w ceftriaxone  - f/u UCx  - tailor ABx based on cultures  - POC US Bladder highlighted mixed echogenicity at inferior aspect, concerning for blood products  - Urology/ED unable to place baum bedside  - tailor ABx based on cultures  - Plan for cystoscopy with baum placement in OR with Urology  - Urology Following    #ST Depression  - EKG in ED highlighted new ST depressions compared to previous EKG from 6 days ago  - Cardiolgy consulted in ED: rec telemetry, trend CBC, BMP, CE, CXR  - c/w to trend troponin     #LINDEN   - likely 2/2 to Obstructive uropathy  - c/w trend Cr/BUN    #CHF  #HTN  #NICM  - Strict I/O  - c/w ASA 81mg  - c/w atorvastatin   - c/w coreg  - c/w entresto  - c/w trend Electrolytes, K>4, Mg>2, Phos>3  - will hold lasix, spiron    #BPH  - will start tamsulosin 0.8 mg at bedtime    #kidney cancer  - previous CT 1/5 shows renal lesion  - f/u outpatient    #Colon cancer s/p resection and chemotherapy  - f/u outpatient    VTE ppx: SCD  GI ppx: Not required, will start Dash Diet after cysto

## 2024-01-12 NOTE — CONSULT NOTE ADULT - NS ATTEND AMEND GEN_ALL_CORE FT
Patient seen and examined at bedside with no chest pain or shortness of breath; consulted for abnormal EKG, which was seen in the past with Twave inversions  No chest pain or shortness of breath   Last TTE done in office in March 2023 showed interval improvement in LVEF to 40-45%   HS trop normal     From a cardiac standpoint is optimized for Cystoscopy and no changes in EKG noted and has  cardiac catheterizations done which showed non obstructive CAD   Can repeat TTE before hospital discharge as patient has been on GDMT and no repeat TTE done to assess if any interval improvement in LVEF   no further inpatient cardiovascular work up and can be restarted on home medications and follow up with Dr. Raghav gao in the office for scheduled visit     Paula Snow D.O. FAC  Cardiology/Vascular Cardiology -[facility]   Telephone # [phone] Patient seen and examined at bedside with no chest pain or shortness of breath; consulted for abnormal EKG, which was seen in the past with Twave inversions  No chest pain or shortness of breath   Last TTE done in office in March 2023 showed interval improvement in LVEF to 40-45%   HS trop normal     From a cardiac standpoint is optimized for Cystoscopy and no changes in EKG noted and has  cardiac catheterizations done which showed non obstructive CAD   Can repeat TTE before hospital discharge as patient has been on GDMT and no repeat TTE done to assess if any interval improvement in LVEF   no further inpatient cardiovascular work up and can be restarted on home medications and follow up with Dr. Raghav gao in the office for scheduled visit     Paula Snow D.O. FAC  Cardiology/Vascular Cardiology -[facility]   Telephone # [phone]

## 2024-01-12 NOTE — CONSULT NOTE ADULT - ASSESSMENT
79yo M w/ HTN, pericardial effusion in 12/2021 s/p window and drain, NICM, LVEF 25%, presents to [facility] for bloody urine output.  States had multiple episodes of intermittent hematuria with clots today and the previous days.  Cardiology called tonight for abnormal ECG findings.  Patient denies chest pain, dyspnea, diaphoresis, palpitations, orthopnea, fever, chills, back pain or leg edema.  ECG: NSR w/ early repol, LVH and minimal ST segment changes in anterior leads (similar to prior)   79yo M w/ HTN, pericardial effusion in 12/2021 s/p window and drain, NICM, LVEF 25%, presents to [facility] for bloody urine output.  States had multiple episodes of intermittent hematuria with clots today and the previous days.  Cardiology called tonight for abnormal ECG findings.  Patient denies chest pain, dyspnea, diaphoresis, palpitations, orthopnea, fever, chills, back pain or leg edema.  ECG: NSR w/ early repol, LVH and minimal ST segment changes in anterior leads (similar to prior)

## 2024-01-12 NOTE — PATIENT PROFILE ADULT - FALL HARM RISK - UNIVERSAL INTERVENTIONS
Bed in lowest position, wheels locked, appropriate side rails in place/Call bell, personal items and telephone in reach/Instruct patient to call for assistance before getting out of bed or chair/Non-slip footwear when patient is out of bed/[place] to call system/Physically safe environment - no spills, clutter or unnecessary equipment/Purposeful Proactive Rounding/Room/bathroom lighting operational, light cord in reach Bed in lowest position, wheels locked, appropriate side rails in place/Call bell, personal items and telephone in reach/Instruct patient to call for assistance before getting out of bed or chair/Non-slip footwear when patient is out of bed/[place] to call system/Physically safe environment - no spills, clutter or unnecessary equipment/Purposeful Proactive Rounding/Room/bathroom lighting operational, light cord in reach

## 2024-01-12 NOTE — ED ADULT NURSE NOTE - NSFALLUNIVINTERV_ED_ALL_ED
Bed/Stretcher in lowest position, wheels locked, appropriate side rails in place/Call bell, personal items and telephone in reach/Instruct patient to call for assistance before getting out of bed/chair/stretcher/Non-slip footwear applied when patient is off stretcher/[place] to call system/Physically safe environment - no spills, clutter or unnecessary equipment/Purposeful proactive rounding/Room/bathroom lighting operational, light cord in reach Bed/Stretcher in lowest position, wheels locked, appropriate side rails in place/Call bell, personal items and telephone in reach/Instruct patient to call for assistance before getting out of bed/chair/stretcher/Non-slip footwear applied when patient is off stretcher/[place] to call system/Physically safe environment - no spills, clutter or unnecessary equipment/Purposeful proactive rounding/Room/bathroom lighting operational, light cord in reach

## 2024-01-12 NOTE — H&P ADULT - TIME BILLING
chart, labs and imaging reviewed. Physical examination, medication reconciliation and documentation. Discussion with patient , resident and ER nurse.

## 2024-01-12 NOTE — PROCEDURE NOTE - NSICDXPROBLEMMLMBOX_GEN
IPSTART~^~1~^~[id number]~^~~^~IPEND  PKSTART~^~[id number]~^~PKEND   IPSTART~^~1~^~[id number]~^~~^~IPEND  PKSTART~^~[id number]~^~PKEND

## 2024-01-12 NOTE — CONSULT NOTE ADULT - ASSESSMENT
Leuk Esterase: Large / RBC: 500 /HPF / WBC 20 /HPF   Sq Epi: x / Non Sq Epi: 3 /HPF / Bacteria: Moderate /HPF        RADIOLOGY & ADDITIONAL STUDIES: Patient is 78 M with multiple medical problems, including severe BPH, last turp 2.5 years ago, ( most of his uro care done in [place])  presenting with worsening hematuria. Patient had been in the ER on 1/5 with the c/o hematuria but we were not called, his productive cough and BNP of 5000 was more of the concern. He returns today with intermittent retention and hematuria. Patient is able to void small amounts and is not in distress, creatinine is elevated from baseline is 1.71 from 1.05 on 1/5 ( patient does wax and wane with the creatinine going back to other admissions). ER attempted to place a baum mutliple times and unsuccessful. I tried twice and was not able to pass. Dr. Tiwari is aware. To note patient has seen Dr. Oglesby once back in march in the outpatient office for a UTI and for followup for  1.9 cm renal lesion seen on ct but no further followup is documented. We have never seen the patient during his admissions to .  Plan:  Dr. Tiwari to attempt baum placement will be in this am   urology to follow   Patient is 78 M with multiple medical problems, including severe BPH, last turp 2.5 years ago, ( most of his uro care done in [place])  presenting with worsening hematuria. Patient had been in the ER on 1/5 with the c/o hematuria but we were not called, his productive cough and BNP of 5000 was more of the concern. He returns today with intermittent retention and hematuria. Patient is able to void small amounts and is not in distress, creatinine is elevated from baseline is 1.71 from 1.05 on 1/5 ( patient does wax and wane with the creatinine going back to other admissions). ER attempted to place a baum mutliple times and unsuccessful. I tried twice and was not able to pass. Dr. Tiwari is aware. To note patient has seen Dr. Oglesby once back in march in the outpatient office for a UTI and for followup for  1.9 cm renal lesion seen on ct but no further followup is documented. We have never seen the patient during his admissions to .  Plan:  Dr. Tiwari to attempt baum placement will be in this am   urology to follow

## 2024-01-12 NOTE — ED ADULT NURSE NOTE - OBJECTIVE STATEMENT
Pt is A&Ox4. Respirations are even and unlabored. Color is appropriate for race. Skin warm and dry. Pt reports hematuria and urinary retention. Urine appears dark red. ED provider evaluating, plan of care ongoing.

## 2024-01-12 NOTE — CONSULT NOTE ADULT - PROBLEM SELECTOR RECOMMENDATION 3
Assess for target organ damage (CKD, papilledema, encephalopathy) BP goal<130/80mmHg  - lifestyle modifications (DASH diet, daily exercise encouraged, weight loss, limit ETOH intake, avoid NSAIDs)   - VS as per unit protocol  - pharmacologic options: Loops w/ GFR<30ml/min), Entresto, BB and Spironolactone    case d/w Dr. Snow

## 2024-01-12 NOTE — BRIEF OPERATIVE NOTE - OPERATION/FINDINGS
rigid cystoscopy- false passages in urethra, blood and clot filled urethra, unable to visualize bladder, unable to pass wire into bladder, complex suprapubic tube insertion, placement of 20Fr [place] tip catheter over wire rigid cystoscopy- false passages in urethra, blood and clot filled urethra, unable to visualize bladder, unable to pass wire into bladder, complex suprapubic tube insertion, placement of 20Fr [place] tip catheter over wire

## 2024-01-12 NOTE — PROGRESS NOTE ADULT - SUBJECTIVE AND OBJECTIVE BOX
Subjective:78yMale pt with multiple medical issues, urinary retention, gross hematuria. Multiple failed attempted at baum cath's last night.  Pt passing small amounts of bloody urine and clots.        Vital Signs Last 24 Hrs  T(C): 36.8 (11 Jan 2024 23:23), Max: 37.3 (11 Jan 2024 20:57)  T(F): 98.2 (11 Jan 2024 23:23), Max: 99.2 (11 Jan 2024 20:57)  HR: 78 (11 Jan 2024 23:23) (78 - 78)  BP: 152/79 (11 Jan 2024 23:23) (139/73 - 152/79)  BP(mean): --  RR: 19 (11 Jan 2024 23:23) (19 - 20)  SpO2: 96% (11 Jan 2024 23:23) (96% - 96%)    Parameters below as of 11 Jan 2024 23:23  Patient On (Oxygen Delivery Method): room air      I&O's Detail      Labs:                        15.1   10.32 )-----------( 326      ( 12 Jan 2024 00:13 )             46.8     01-12    144  |  104  |  25.9<H>  ----------------------------<  99  4.8   |  25.0  |  1.72<H>    Ca    8.9      12 Jan 2024 00:13    TPro  7.5  /  Alb  4.0  /  TBili  0.6  /  DBili  x   /  AST  29  /  ALT  21  /  AlkPhos  111  01-12    PT/INR - ( 12 Jan 2024 00:13 )   PT: 12.9 sec;   INR: 1.17 ratio         PTT - ( 12 Jan 2024 00:13 )  PTT:34.1 sec

## 2024-01-12 NOTE — CONSULT NOTE ADULT - PROBLEM SELECTOR RECOMMENDATION 9
Admit and monitor on Tele overnight for acute arrhythmias, check labs including CBC, BMP, trend CE x3,   - FLP and A1C in AM, check ECG and CXR tonight  - ECG similar to one from January 5th in [facility], patient asymptomatic and denies cardiac symptoms  - continue home ASA, Coreg, Entresto and Atorvastatin and low cholesterol diet, monitor lFts  - no other cardiac intervention at this time  - recent LHC with [facility] Admit and monitor on Tele overnight for acute arrhythmias, check labs including CBC, BMP, trend CE x3,   - FLP and A1C in AM, check ECG and CXR tonight  - ECG similar to one from January 5th in [facility], patient asymptomatic and denies cardiac symptoms  - continue home ASA, Coreg, Entresto and Atorvastatin and low cholesterol diet, monitor lFts  - no other cardiac intervention at this time  - recent LHC with [facility]

## 2024-01-12 NOTE — CONSULT NOTE ADULT - PROBLEM SELECTOR RECOMMENDATION 2
On Lasix 40mg oral daily at home and Spironolactone 25mg PO QD for now, patient clinically euvolemic and not in acute decompensated state, no JVD or lE edema noted  - Strict I/O and daily standing weights (if possible), low Na diet  - trend daily BMP, keep K > 4, Mg > 2    - monitor renal function with ongoing diuresis   - continue Carvedilol, Entresto and ASA  - CXR no edema/congestion  - no need to repeat TTE  - medication and dietary compliance encouraged

## 2024-01-12 NOTE — CHART NOTE - NSCHARTNOTEFT_GEN_A_CORE
Subjective: patient evaluated and examined at the bedside for post-operative check. Pt denies acute complaints at this time. Penile dressing saturated with clotted blood, urethral xeroform dressing intact and to remain in place. SPT draining fruit punch urine.       STATUS POST:  cystoscopy, SPT placement    POST OPERATIVE DAY #: 0    MEDICATIONS  (STANDING):  acetaminophen   IVPB .. 1000 milliGRAM(s) IV Intermittent once  atorvastatin 40 milliGRAM(s) Oral at bedtime  carvedilol 12.5 milliGRAM(s) Oral every 12 hours  cefTRIAXone Injectable. 1000 milliGRAM(s) IV Push every 24 hours  sacubitril 49 mG/valsartan 51 mG 1 Tablet(s) Oral two times a day  tamsulosin 0.8 milliGRAM(s) Oral at bedtime    MEDICATIONS  (PRN):  acetaminophen     Tablet .. 650 milliGRAM(s) Oral every 6 hours PRN Temp greater or equal to 38C (100.4F), Mild Pain (1 - 3)  aluminum hydroxide/magnesium hydroxide/simethicone Suspension 30 milliLiter(s) Oral every 4 hours PRN Dyspepsia  melatonin 3 milliGRAM(s) Oral at bedtime PRN Insomnia  ondansetron Injectable 4 milliGRAM(s) IV Push every 8 hours PRN Nausea and/or Vomiting      Vital Signs Last 24 Hrs  T(C): 36.9 (12 Jan 2024 16:00), Max: 37.3 (11 Jan 2024 20:57)  T(F): 98.5 (12 Jan 2024 16:00), Max: 99.2 (11 Jan 2024 20:57)  HR: 61 (12 Jan 2024 16:00) (55 - 78)  BP: 127/58 (12 Jan 2024 16:00) (100/85 - 170/78)  BP(mean): 75 (12 Jan 2024 14:40) (66 - 92)  RR: 20 (12 Jan 2024 16:00) (11 - 20)  SpO2: 95% (12 Jan 2024 16:00) (92% - 100%)    Parameters below as of 12 Jan 2024 16:00  Patient On (Oxygen Delivery Method): room air        Physical Exam:    Constitutional: NAD  HEENT: PERRL, EOMI  Respiratory: Respirations non-labored, no accessory muscle use  Gastrointestinal: Soft, non-tender, non-distended  : SPT in place draining fruit punch urine, penile dressing saturated with clotted blood. Dressing replaced with gauze and kerlex.   Neurological: A&O x 3; without gross deficit      LABS:                        13.2   13.50 )-----------( 285      ( 12 Jan 2024 15:47 )             39.9     01-12    137  |  103  |  30.4<H>  ----------------------------<  93  4.6   |  20.0<L>  |  1.63<H>    Ca    8.3<L>      12 Jan 2024 08:10    TPro  7.1  /  Alb  3.6  /  TBili  0.5  /  DBili  x   /  AST  30  /  ALT  18  /  AlkPhos  102  01-12    PT/INR - ( 12 Jan 2024 00:13 )   PT: 12.9 sec;   INR: 1.17 ratio         PTT - ( 12 Jan 2024 00:13 )  PTT:34.1 sec  Urinalysis Basic - ( 12 Jan 2024 08:10 )    Color: x / Appearance: x / SG: x / pH: x  Gluc: 93 mg/dL / Ketone: x  / Bili: x / Urobili: x   Blood: x / Protein: x / Nitrite: x   Leuk Esterase: x / RBC: x / WBC x   Sq Epi: x / Non Sq Epi: x / Bacteria: x        A: 78M POD#0 s/p cystoscopy, SPT placement d/t clot retention.     Plan:   -irrigate SPT prn   -change penile dressing prn, coban with gauze for light compression  -f/u repeat H/H   -monitor SPT output  -ofirmev for bladder spasm pain, avoid NSAIDs d/t elevated Cr and Oxybutynin for delirium precautions Subjective: patient evaluated and examined at the bedside for post-operative check. Pt denies acute complaints at this time. Penile dressing saturated with clotted blood, urethral xeroform dressing intact and to remain in place. SPT draining fruit punch urine.       STATUS POST:  cystoscopy, SPT placement    POST OPERATIVE DAY #: 0    MEDICATIONS  (STANDING):  acetaminophen   IVPB .. 1000 milliGRAM(s) IV Intermittent once  atorvastatin 40 milliGRAM(s) Oral at bedtime  carvedilol 12.5 milliGRAM(s) Oral every 12 hours  cefTRIAXone Injectable. 1000 milliGRAM(s) IV Push every 24 hours  sacubitril 49 mG/valsartan 51 mG 1 Tablet(s) Oral two times a day  tamsulosin 0.8 milliGRAM(s) Oral at bedtime    MEDICATIONS  (PRN):  acetaminophen     Tablet .. 650 milliGRAM(s) Oral every 6 hours PRN Temp greater or equal to 38C (100.4F), Mild Pain (1 - 3)  aluminum hydroxide/magnesium hydroxide/simethicone Suspension 30 milliLiter(s) Oral every 4 hours PRN Dyspepsia  melatonin 3 milliGRAM(s) Oral at bedtime PRN Insomnia  ondansetron Injectable 4 milliGRAM(s) IV Push every 8 hours PRN Nausea and/or Vomiting      Vital Signs Last 24 Hrs  T(C): 36.9 (12 Jan 2024 16:00), Max: 37.3 (11 Jan 2024 20:57)  T(F): 98.5 (12 Jan 2024 16:00), Max: 99.2 (11 Jan 2024 20:57)  HR: 61 (12 Jan 2024 16:00) (55 - 78)  BP: 127/58 (12 Jan 2024 16:00) (100/85 - 170/78)  BP(mean): 75 (12 Jan 2024 14:40) (66 - 92)  RR: 20 (12 Jan 2024 16:00) (11 - 20)  SpO2: 95% (12 Jan 2024 16:00) (92% - 100%)    Parameters below as of 12 Jan 2024 16:00  Patient On (Oxygen Delivery Method): room air        Physical Exam:    Constitutional: NAD  HEENT: PERRL, EOMI  Respiratory: Respirations non-labored, no accessory muscle use  Gastrointestinal: Soft, non-tender, non-distended  : SPT in place draining fruit punch urine, penile dressing saturated with clotted blood. Dressing replaced with gauze and kerlex.   Neurological: A&O x 3; without gross deficit      LABS:                        13.2   13.50 )-----------( 285      ( 12 Jan 2024 15:47 )             39.9     01-12    137  |  103  |  30.4<H>  ----------------------------<  93  4.6   |  20.0<L>  |  1.63<H>    Ca    8.3<L>      12 Jan 2024 08:10    TPro  7.1  /  Alb  3.6  /  TBili  0.5  /  DBili  x   /  AST  30  /  ALT  18  /  AlkPhos  102  01-12    PT/INR - ( 12 Jan 2024 00:13 )   PT: 12.9 sec;   INR: 1.17 ratio         PTT - ( 12 Jan 2024 00:13 )  PTT:34.1 sec  Urinalysis Basic - ( 12 Jan 2024 08:10 )    Color: x / Appearance: x / SG: x / pH: x  Gluc: 93 mg/dL / Ketone: x  / Bili: x / Urobili: x   Blood: x / Protein: x / Nitrite: x   Leuk Esterase: x / RBC: x / WBC x   Sq Epi: x / Non Sq Epi: x / Bacteria: x        A: 78M POD#0 s/p cystoscopy, SPT placement d/t clot retention. SPT irrigated at bedside with sterile water, no clots evacuated. Draining light pink/yellow urine following irrigation.     Plan:   -irrigate SPT prn   -change penile dressing prn, coban with gauze for light compression  -f/u repeat H/H   -monitor SPT output  -ofirmev for bladder spasm pain, avoid NSAIDs d/t elevated Cr and Oxybutynin for delirium precautions Subjective: patient evaluated and examined at the bedside for post-operative check. Pt denies acute complaints at this time. Penile dressing saturated with clotted blood, urethral xeroform dressing intact and to remain in place. SPT draining fruit punch urine.       STATUS POST:  cystoscopy, SPT placement    POST OPERATIVE DAY #: 0    MEDICATIONS  (STANDING):  acetaminophen   IVPB .. 1000 milliGRAM(s) IV Intermittent once  atorvastatin 40 milliGRAM(s) Oral at bedtime  carvedilol 12.5 milliGRAM(s) Oral every 12 hours  cefTRIAXone Injectable. 1000 milliGRAM(s) IV Push every 24 hours  sacubitril 49 mG/valsartan 51 mG 1 Tablet(s) Oral two times a day  tamsulosin 0.8 milliGRAM(s) Oral at bedtime    MEDICATIONS  (PRN):  acetaminophen     Tablet .. 650 milliGRAM(s) Oral every 6 hours PRN Temp greater or equal to 38C (100.4F), Mild Pain (1 - 3)  aluminum hydroxide/magnesium hydroxide/simethicone Suspension 30 milliLiter(s) Oral every 4 hours PRN Dyspepsia  melatonin 3 milliGRAM(s) Oral at bedtime PRN Insomnia  ondansetron Injectable 4 milliGRAM(s) IV Push every 8 hours PRN Nausea and/or Vomiting      Vital Signs Last 24 Hrs  T(C): 36.9 (12 Jan 2024 16:00), Max: 37.3 (11 Jan 2024 20:57)  T(F): 98.5 (12 Jan 2024 16:00), Max: 99.2 (11 Jan 2024 20:57)  HR: 61 (12 Jan 2024 16:00) (55 - 78)  BP: 127/58 (12 Jan 2024 16:00) (100/85 - 170/78)  BP(mean): 75 (12 Jan 2024 14:40) (66 - 92)  RR: 20 (12 Jan 2024 16:00) (11 - 20)  SpO2: 95% (12 Jan 2024 16:00) (92% - 100%)    Parameters below as of 12 Jan 2024 16:00  Patient On (Oxygen Delivery Method): room air        Physical Exam:    Constitutional: NAD  HEENT: PERRL, EOMI  Respiratory: Respirations non-labored, no accessory muscle use  Gastrointestinal: Soft, non-tender, non-distended  : SPT in place draining fruit punch urine, penile dressing saturated with clotted blood. Dressing replaced with gauze and coban   Neurological: A&O x 3; without gross deficit      LABS:                        13.2   13.50 )-----------( 285      ( 12 Jan 2024 15:47 )             39.9     01-12    137  |  103  |  30.4<H>  ----------------------------<  93  4.6   |  20.0<L>  |  1.63<H>    Ca    8.3<L>      12 Jan 2024 08:10    TPro  7.1  /  Alb  3.6  /  TBili  0.5  /  DBili  x   /  AST  30  /  ALT  18  /  AlkPhos  102  01-12    PT/INR - ( 12 Jan 2024 00:13 )   PT: 12.9 sec;   INR: 1.17 ratio         PTT - ( 12 Jan 2024 00:13 )  PTT:34.1 sec  Urinalysis Basic - ( 12 Jan 2024 08:10 )    Color: x / Appearance: x / SG: x / pH: x  Gluc: 93 mg/dL / Ketone: x  / Bili: x / Urobili: x   Blood: x / Protein: x / Nitrite: x   Leuk Esterase: x / RBC: x / WBC x   Sq Epi: x / Non Sq Epi: x / Bacteria: x        A: 78M POD#0 s/p cystoscopy, SPT placement d/t clot retention. SPT irrigated at bedside with sterile water, no clots evacuated. Draining light pink/yellow urine following irrigation.     Plan:   -irrigate SPT prn   -change penile dressing prn, coban with gauze for light compression  -f/u repeat H/H   -monitor SPT output  -ofirmev for bladder spasm pain, avoid NSAIDs d/t elevated Cr and Oxybutynin for delirium precautions

## 2024-01-12 NOTE — ED ADULT NURSE REASSESSMENT NOTE - NS ED NURSE REASSESS COMMENT FT1
pt care assumed at 0730, no apparent distress noted at this time, charting as noted. Pt received A&Ox3 resting in bed comfortably at this time. Urology at bedside, for baum catheter. Urology recommending OR for placement. VSS.

## 2024-01-12 NOTE — BRIEF OPERATIVE NOTE - NSICDXBRIEFPOSTOP_GEN_ALL_CORE_FT
POST-OP DIAGNOSIS:  Gross hematuria 12-Jan-2024 12:24:54  Shirley Garcia  Urethral false passage 12-Jan-2024 12:25:03  Shirley Garcia

## 2024-01-13 LAB
ALBUMIN SERPL ELPH-MCNC: 3 G/DL — LOW (ref 3.3–5.2)
ALBUMIN SERPL ELPH-MCNC: 3 G/DL — LOW (ref 3.3–5.2)
ALP SERPL-CCNC: 78 U/L — SIGNIFICANT CHANGE UP (ref 40–120)
ALP SERPL-CCNC: 78 U/L — SIGNIFICANT CHANGE UP (ref 40–120)
ALT FLD-CCNC: 12 U/L — SIGNIFICANT CHANGE UP
ALT FLD-CCNC: 12 U/L — SIGNIFICANT CHANGE UP
ANION GAP SERPL CALC-SCNC: 10 MMOL/L — SIGNIFICANT CHANGE UP (ref 5–17)
ANION GAP SERPL CALC-SCNC: 10 MMOL/L — SIGNIFICANT CHANGE UP (ref 5–17)
AST SERPL-CCNC: 24 U/L — SIGNIFICANT CHANGE UP
AST SERPL-CCNC: 24 U/L — SIGNIFICANT CHANGE UP
BASOPHILS # BLD AUTO: 0.02 K/UL — SIGNIFICANT CHANGE UP (ref 0–0.2)
BASOPHILS # BLD AUTO: 0.02 K/UL — SIGNIFICANT CHANGE UP (ref 0–0.2)
BASOPHILS NFR BLD AUTO: 0.1 % — SIGNIFICANT CHANGE UP (ref 0–2)
BASOPHILS NFR BLD AUTO: 0.1 % — SIGNIFICANT CHANGE UP (ref 0–2)
BILIRUB SERPL-MCNC: 0.4 MG/DL — SIGNIFICANT CHANGE UP (ref 0.4–2)
BILIRUB SERPL-MCNC: 0.4 MG/DL — SIGNIFICANT CHANGE UP (ref 0.4–2)
BUN SERPL-MCNC: 36.7 MG/DL — HIGH (ref 8–20)
BUN SERPL-MCNC: 36.7 MG/DL — HIGH (ref 8–20)
CALCIUM SERPL-MCNC: 7.6 MG/DL — LOW (ref 8.4–10.5)
CALCIUM SERPL-MCNC: 7.6 MG/DL — LOW (ref 8.4–10.5)
CHLORIDE SERPL-SCNC: 105 MMOL/L — SIGNIFICANT CHANGE UP (ref 96–108)
CHLORIDE SERPL-SCNC: 105 MMOL/L — SIGNIFICANT CHANGE UP (ref 96–108)
CO2 SERPL-SCNC: 22 MMOL/L — SIGNIFICANT CHANGE UP (ref 22–29)
CO2 SERPL-SCNC: 22 MMOL/L — SIGNIFICANT CHANGE UP (ref 22–29)
CREAT SERPL-MCNC: 1.4 MG/DL — HIGH (ref 0.5–1.3)
CREAT SERPL-MCNC: 1.4 MG/DL — HIGH (ref 0.5–1.3)
EGFR: 51 ML/MIN/1.73M2 — LOW
EGFR: 51 ML/MIN/1.73M2 — LOW
EOSINOPHIL # BLD AUTO: 0.01 K/UL — SIGNIFICANT CHANGE UP (ref 0–0.5)
EOSINOPHIL # BLD AUTO: 0.01 K/UL — SIGNIFICANT CHANGE UP (ref 0–0.5)
EOSINOPHIL NFR BLD AUTO: 0.1 % — SIGNIFICANT CHANGE UP (ref 0–6)
EOSINOPHIL NFR BLD AUTO: 0.1 % — SIGNIFICANT CHANGE UP (ref 0–6)
GLUCOSE SERPL-MCNC: 97 MG/DL — SIGNIFICANT CHANGE UP (ref 70–99)
GLUCOSE SERPL-MCNC: 97 MG/DL — SIGNIFICANT CHANGE UP (ref 70–99)
HCT VFR BLD CALC: 35.4 % — LOW (ref 39–50)
HCT VFR BLD CALC: 35.4 % — LOW (ref 39–50)
HCT VFR BLD CALC: 36 % — LOW (ref 39–50)
HCT VFR BLD CALC: 36 % — LOW (ref 39–50)
HGB BLD-MCNC: 11.9 G/DL — LOW (ref 13–17)
HGB BLD-MCNC: 11.9 G/DL — LOW (ref 13–17)
HGB BLD-MCNC: 12 G/DL — LOW (ref 13–17)
HGB BLD-MCNC: 12 G/DL — LOW (ref 13–17)
IMM GRANULOCYTES NFR BLD AUTO: 0.4 % — SIGNIFICANT CHANGE UP (ref 0–0.9)
IMM GRANULOCYTES NFR BLD AUTO: 0.4 % — SIGNIFICANT CHANGE UP (ref 0–0.9)
LYMPHOCYTES # BLD AUTO: 0.84 K/UL — LOW (ref 1–3.3)
LYMPHOCYTES # BLD AUTO: 0.84 K/UL — LOW (ref 1–3.3)
LYMPHOCYTES # BLD AUTO: 6.2 % — LOW (ref 13–44)
LYMPHOCYTES # BLD AUTO: 6.2 % — LOW (ref 13–44)
MAGNESIUM SERPL-MCNC: 2.1 MG/DL — SIGNIFICANT CHANGE UP (ref 1.6–2.6)
MAGNESIUM SERPL-MCNC: 2.1 MG/DL — SIGNIFICANT CHANGE UP (ref 1.6–2.6)
MCHC RBC-ENTMCNC: 30.6 PG — SIGNIFICANT CHANGE UP (ref 27–34)
MCHC RBC-ENTMCNC: 30.6 PG — SIGNIFICANT CHANGE UP (ref 27–34)
MCHC RBC-ENTMCNC: 33.6 GM/DL — SIGNIFICANT CHANGE UP (ref 32–36)
MCHC RBC-ENTMCNC: 33.6 GM/DL — SIGNIFICANT CHANGE UP (ref 32–36)
MCV RBC AUTO: 91 FL — SIGNIFICANT CHANGE UP (ref 80–100)
MCV RBC AUTO: 91 FL — SIGNIFICANT CHANGE UP (ref 80–100)
MONOCYTES # BLD AUTO: 0.79 K/UL — SIGNIFICANT CHANGE UP (ref 0–0.9)
MONOCYTES # BLD AUTO: 0.79 K/UL — SIGNIFICANT CHANGE UP (ref 0–0.9)
MONOCYTES NFR BLD AUTO: 5.9 % — SIGNIFICANT CHANGE UP (ref 2–14)
MONOCYTES NFR BLD AUTO: 5.9 % — SIGNIFICANT CHANGE UP (ref 2–14)
NEUTROPHILS # BLD AUTO: 11.73 K/UL — HIGH (ref 1.8–7.4)
NEUTROPHILS # BLD AUTO: 11.73 K/UL — HIGH (ref 1.8–7.4)
NEUTROPHILS NFR BLD AUTO: 87.3 % — HIGH (ref 43–77)
NEUTROPHILS NFR BLD AUTO: 87.3 % — HIGH (ref 43–77)
PHOSPHATE SERPL-MCNC: 2.4 MG/DL — SIGNIFICANT CHANGE UP (ref 2.4–4.7)
PHOSPHATE SERPL-MCNC: 2.4 MG/DL — SIGNIFICANT CHANGE UP (ref 2.4–4.7)
PLATELET # BLD AUTO: 263 K/UL — SIGNIFICANT CHANGE UP (ref 150–400)
PLATELET # BLD AUTO: 263 K/UL — SIGNIFICANT CHANGE UP (ref 150–400)
POTASSIUM SERPL-MCNC: 4.6 MMOL/L — SIGNIFICANT CHANGE UP (ref 3.5–5.3)
POTASSIUM SERPL-MCNC: 4.6 MMOL/L — SIGNIFICANT CHANGE UP (ref 3.5–5.3)
POTASSIUM SERPL-SCNC: 4.6 MMOL/L — SIGNIFICANT CHANGE UP (ref 3.5–5.3)
POTASSIUM SERPL-SCNC: 4.6 MMOL/L — SIGNIFICANT CHANGE UP (ref 3.5–5.3)
PROT SERPL-MCNC: 6 G/DL — LOW (ref 6.6–8.7)
PROT SERPL-MCNC: 6 G/DL — LOW (ref 6.6–8.7)
RBC # BLD: 3.89 M/UL — LOW (ref 4.2–5.8)
RBC # BLD: 3.89 M/UL — LOW (ref 4.2–5.8)
RBC # FLD: 12.9 % — SIGNIFICANT CHANGE UP (ref 10.3–14.5)
RBC # FLD: 12.9 % — SIGNIFICANT CHANGE UP (ref 10.3–14.5)
SODIUM SERPL-SCNC: 137 MMOL/L — SIGNIFICANT CHANGE UP (ref 135–145)
SODIUM SERPL-SCNC: 137 MMOL/L — SIGNIFICANT CHANGE UP (ref 135–145)
TROPONIN T, HIGH SENSITIVITY RESULT: 27 NG/L — SIGNIFICANT CHANGE UP (ref 0–51)
TROPONIN T, HIGH SENSITIVITY RESULT: 27 NG/L — SIGNIFICANT CHANGE UP (ref 0–51)
WBC # BLD: 13.45 K/UL — HIGH (ref 3.8–10.5)
WBC # BLD: 13.45 K/UL — HIGH (ref 3.8–10.5)
WBC # FLD AUTO: 13.45 K/UL — HIGH (ref 3.8–10.5)
WBC # FLD AUTO: 13.45 K/UL — HIGH (ref 3.8–10.5)

## 2024-01-13 PROCEDURE — 99233 SBSQ HOSP IP/OBS HIGH 50: CPT

## 2024-01-13 RX ORDER — GABAPENTIN 400 MG/1
300 CAPSULE ORAL THREE TIMES A DAY
Refills: 0 | Status: DISCONTINUED | OUTPATIENT
Start: 2024-01-13 | End: 2024-01-16

## 2024-01-13 RX ORDER — ACETAMINOPHEN 500 MG
1000 TABLET ORAL ONCE
Refills: 0 | Status: COMPLETED | OUTPATIENT
Start: 2024-01-13 | End: 2024-01-13

## 2024-01-13 RX ADMIN — GABAPENTIN 300 MILLIGRAM(S): 400 CAPSULE ORAL at 21:19

## 2024-01-13 RX ADMIN — Medication 3 MILLIGRAM(S): at 23:38

## 2024-01-13 RX ADMIN — ATORVASTATIN CALCIUM 40 MILLIGRAM(S): 80 TABLET, FILM COATED ORAL at 21:19

## 2024-01-13 RX ADMIN — SACUBITRIL AND VALSARTAN 1 TABLET(S): 24; 26 TABLET, FILM COATED ORAL at 18:10

## 2024-01-13 RX ADMIN — Medication 400 MILLIGRAM(S): at 11:44

## 2024-01-13 RX ADMIN — TAMSULOSIN HYDROCHLORIDE 0.8 MILLIGRAM(S): 0.4 CAPSULE ORAL at 21:18

## 2024-01-13 RX ADMIN — SACUBITRIL AND VALSARTAN 1 TABLET(S): 24; 26 TABLET, FILM COATED ORAL at 05:55

## 2024-01-13 RX ADMIN — CEFTRIAXONE 1000 MILLIGRAM(S): 500 INJECTION, POWDER, FOR SOLUTION INTRAMUSCULAR; INTRAVENOUS at 21:19

## 2024-01-13 NOTE — PROGRESS NOTE ADULT - ASSESSMENT
77 y/o M with a PMH of Kidney Cancer, BPH, Pericardial Effusion, NICM, HTN, colon CA s/p resection and chemotherapy approximately 3 years ago presented with hematuria found to have UTI with obstructive uropathy.    #UTI with hematuria  #Obstructive uropathy  - POC US Bladder highlighted mixed echogenicity at inferior aspect, concerning for blood products  - Urology/ED unable to place baum at bedside  - s/p cystoscopy with suprapubic catheter placement on 1/12/24  - Urology Following  - s/p 1x ceftriaxone  - c/w ceftriaxone  - Continue Flomax   - f/u UCx  -Blood cultures negative so far    #LINDEN   -likely 2/2 to Obstructive uropathy  -Avoid nephrotoxic medications  -Monitor     #Abnormal EKG  -Chronic TWI   -Asymptomatic   -Cardio consult appreciated     #HTN  #NICM  - Strict I/O  - c/w atorvastatin   - c/w coreg  - c/w entresto (monitor renal function)   - c/w trend Electrolytes, K>4, Mg>2, Phos>3  - will hold lasix, spiron and jardiance   - Follow ECHO report     #kidney cancer  - previous CT 1/5 shows renal lesion  - outpatient Urology follow up    #Colon cancer s/p resection and chemotherapy  - f/u outpatient    DVT Prophylaxis -- Venodyne    Dispo: Likely home once stable.      79 y/o M with a PMH of Kidney Cancer, BPH, Pericardial Effusion, NICM, HTN, colon CA s/p resection and chemotherapy approximately 3 years ago presented with hematuria found to have UTI with obstructive uropathy.    #UTI with hematuria  #Obstructive uropathy  - POC US Bladder highlighted mixed echogenicity at inferior aspect, concerning for blood products  - Urology/ED unable to place baum at bedside  - s/p cystoscopy with suprapubic catheter placement on 1/12/24  - Urology Following  - s/p 1x ceftriaxone  - c/w ceftriaxone  - Continue Flomax   - f/u UCx  -Blood cultures negative so far    #LINDEN   -likely 2/2 to Obstructive uropathy  -Avoid nephrotoxic medications  -Monitor     #Abnormal EKG  -Chronic TWI   -Asymptomatic   -Cardio consult appreciated     #HTN  #NICM  - Strict I/O  - c/w atorvastatin   - c/w coreg  - c/w entresto (monitor renal function)   - c/w trend Electrolytes, K>4, Mg>2, Phos>3  - will hold lasix, spiron and jardiance   - Follow ECHO report     #kidney cancer  - previous CT 1/5 shows renal lesion  - outpatient Urology follow up    #Colon cancer s/p resection and chemotherapy  - f/u outpatient    DVT Prophylaxis -- Venodyne    Dispo: Likely home once stable.

## 2024-01-13 NOTE — PROGRESS NOTE ADULT - SUBJECTIVE AND OBJECTIVE BOX
Urinary tract infection    HPI:  79 y/o M with a PMH of HTN, kidney cancer, pericardial effusion, BPH, NICM, colon CA s/p resection and chemotherapy approximately 3 years ago, CHF with a LVEF of 25 presents with hematuria. Initially presented to the ED on 1/5 with flu like symptoms and reported to have hematuria that resolved upon presentation. CT scan from visit showed a renal lesion. States has had several episodes of intermittent hematuria with clots today and yesterday.  Also reports increase strain during urination. Endorses increasing fatigue and myalgias over the last few days.  Denies fever, chills, back pain, chest pain, SOB, recent travel or sick contact.    In ED, received 1x ceftriaxone.  (12 Jan 2024 04:59)    Interval History:  Patient was seen and examined at bedside around 11 am.  Complaining of hematuria.   Denies abdominal pain, nausea or vomiting.   Denies chest pain, palpitations or shortness of breath.    ROS:  As per interval history otherwise unremarkable.    PHYSICAL EXAM:  Vital Signs  T(C): 37 (13 Jan 2024 08:30), Max: 37 (13 Jan 2024 08:30)  T(F): 98.6 (13 Jan 2024 08:30), Max: 98.6 (13 Jan 2024 08:30)  HR: 69 (13 Jan 2024 08:30) (53 - 69)  BP: 149/65 (13 Jan 2024 08:30) (126/65 - 152/50)  BP(mean): 93 (13 Jan 2024 08:30) (89 - 93)  RR: 18 (13 Jan 2024 08:30) (18 - 20)  SpO2: 96% (13 Jan 2024 08:30) (93% - 96%)  Parameters below as of 13 Jan 2024 08:30  Patient On (Oxygen Delivery Method): room air  General: Elderly male sitting in chair comfortably. No acute distress  HEENT: EOMI. Clear conjunctivae. Moist mucus membrane  Neck: Supple.   Chest: Good air entry. No wheezing, rales or rhonchi.   Heart: Normal S1 & S2. RRR.   Abdomen: Non distended. Soft. Non-tender. + BS. Suprapubic catheter in place.   Ext: No pedal edema. No calf tenderness   Neuro: Awake and alert. No focal deficit. Speech clear.   Skin: Warm and Dry  Psychiatry: Normal mood and affect    I&O's Summary    12 Jan 2024 07:01  -  13 Jan 2024 07:00  --------------------------------------------------------  IN: 419 mL / OUT: 400 mL / NET: 19 mL    LABS:                      11.9   13.45 )-----------( 263      ( 13 Jan 2024 07:15 )             35.4     01-13    137  |  105  |  36.7<H>  ----------------------------<  97  4.6   |  22.0  |  1.40<H>    Ca    7.6<L>      13 Jan 2024 07:15  Phos  2.4     01-13  Mg     2.1     01-13    TPro  6.0<L>  /  Alb  3.0<L>  /  TBili  0.4  /  DBili  x   /  AST  24  /  ALT  12  /  AlkPhos  78  01-13    PT/INR - ( 12 Jan 2024 00:13 )   PT: 12.9 sec;   INR: 1.17 ratio       PTT - ( 12 Jan 2024 00:13 )  PTT:34.1 sec  Urinalysis Basic - ( 13 Jan 2024 07:15 )    Color: x / Appearance: x / SG: x / pH: x  Gluc: 97 mg/dL / Ketone: x  / Bili: x / Urobili: x   Blood: x / Protein: x / Nitrite: x   Leuk Esterase: x / RBC: x / WBC x   Sq Epi: x / Non Sq Epi: x / Bacteria: x    RADIOLOGY & ADDITIONAL STUDIES:  Reviewed     MEDICATIONS  (STANDING):  atorvastatin 40 milliGRAM(s) Oral at bedtime  carvedilol 12.5 milliGRAM(s) Oral every 12 hours  cefTRIAXone Injectable. 1000 milliGRAM(s) IV Push every 24 hours  influenza  Vaccine (HIGH DOSE) 0.7 milliLiter(s) IntraMuscular once  sacubitril 49 mG/valsartan 51 mG 1 Tablet(s) Oral two times a day  tamsulosin 0.8 milliGRAM(s) Oral at bedtime    MEDICATIONS  (PRN):  acetaminophen     Tablet .. 650 milliGRAM(s) Oral every 6 hours PRN Temp greater or equal to 38C (100.4F), Mild Pain (1 - 3)  aluminum hydroxide/magnesium hydroxide/simethicone Suspension 30 milliLiter(s) Oral every 4 hours PRN Dyspepsia  melatonin 3 milliGRAM(s) Oral at bedtime PRN Insomnia  ondansetron Injectable 4 milliGRAM(s) IV Push every 8 hours PRN Nausea and/or Vomiting       Urinary tract infection    HPI:  77 y/o M with a PMH of HTN, kidney cancer, pericardial effusion, BPH, NICM, colon CA s/p resection and chemotherapy approximately 3 years ago, CHF with a LVEF of 25 presents with hematuria. Initially presented to the ED on 1/5 with flu like symptoms and reported to have hematuria that resolved upon presentation. CT scan from visit showed a renal lesion. States has had several episodes of intermittent hematuria with clots today and yesterday.  Also reports increase strain during urination. Endorses increasing fatigue and myalgias over the last few days.  Denies fever, chills, back pain, chest pain, SOB, recent travel or sick contact.    In ED, received 1x ceftriaxone.  (12 Jan 2024 04:59)    Interval History:  Patient was seen and examined at bedside around 11 am.  Complaining of hematuria.   Denies abdominal pain, nausea or vomiting.   Denies chest pain, palpitations or shortness of breath.    ROS:  As per interval history otherwise unremarkable.    PHYSICAL EXAM:  Vital Signs  T(C): 37 (13 Jan 2024 08:30), Max: 37 (13 Jan 2024 08:30)  T(F): 98.6 (13 Jan 2024 08:30), Max: 98.6 (13 Jan 2024 08:30)  HR: 69 (13 Jan 2024 08:30) (53 - 69)  BP: 149/65 (13 Jan 2024 08:30) (126/65 - 152/50)  BP(mean): 93 (13 Jan 2024 08:30) (89 - 93)  RR: 18 (13 Jan 2024 08:30) (18 - 20)  SpO2: 96% (13 Jan 2024 08:30) (93% - 96%)  Parameters below as of 13 Jan 2024 08:30  Patient On (Oxygen Delivery Method): room air  General: Elderly male sitting in chair comfortably. No acute distress  HEENT: EOMI. Clear conjunctivae. Moist mucus membrane  Neck: Supple.   Chest: Good air entry. No wheezing, rales or rhonchi.   Heart: Normal S1 & S2. RRR.   Abdomen: Non distended. Soft. Non-tender. + BS. Suprapubic catheter in place.   Ext: No pedal edema. No calf tenderness   Neuro: Awake and alert. No focal deficit. Speech clear.   Skin: Warm and Dry  Psychiatry: Normal mood and affect    I&O's Summary    12 Jan 2024 07:01  -  13 Jan 2024 07:00  --------------------------------------------------------  IN: 419 mL / OUT: 400 mL / NET: 19 mL    LABS:                      11.9   13.45 )-----------( 263      ( 13 Jan 2024 07:15 )             35.4     01-13    137  |  105  |  36.7<H>  ----------------------------<  97  4.6   |  22.0  |  1.40<H>    Ca    7.6<L>      13 Jan 2024 07:15  Phos  2.4     01-13  Mg     2.1     01-13    TPro  6.0<L>  /  Alb  3.0<L>  /  TBili  0.4  /  DBili  x   /  AST  24  /  ALT  12  /  AlkPhos  78  01-13    PT/INR - ( 12 Jan 2024 00:13 )   PT: 12.9 sec;   INR: 1.17 ratio       PTT - ( 12 Jan 2024 00:13 )  PTT:34.1 sec  Urinalysis Basic - ( 13 Jan 2024 07:15 )    Color: x / Appearance: x / SG: x / pH: x  Gluc: 97 mg/dL / Ketone: x  / Bili: x / Urobili: x   Blood: x / Protein: x / Nitrite: x   Leuk Esterase: x / RBC: x / WBC x   Sq Epi: x / Non Sq Epi: x / Bacteria: x    RADIOLOGY & ADDITIONAL STUDIES:  Reviewed     MEDICATIONS  (STANDING):  atorvastatin 40 milliGRAM(s) Oral at bedtime  carvedilol 12.5 milliGRAM(s) Oral every 12 hours  cefTRIAXone Injectable. 1000 milliGRAM(s) IV Push every 24 hours  influenza  Vaccine (HIGH DOSE) 0.7 milliLiter(s) IntraMuscular once  sacubitril 49 mG/valsartan 51 mG 1 Tablet(s) Oral two times a day  tamsulosin 0.8 milliGRAM(s) Oral at bedtime    MEDICATIONS  (PRN):  acetaminophen     Tablet .. 650 milliGRAM(s) Oral every 6 hours PRN Temp greater or equal to 38C (100.4F), Mild Pain (1 - 3)  aluminum hydroxide/magnesium hydroxide/simethicone Suspension 30 milliLiter(s) Oral every 4 hours PRN Dyspepsia  melatonin 3 milliGRAM(s) Oral at bedtime PRN Insomnia  ondansetron Injectable 4 milliGRAM(s) IV Push every 8 hours PRN Nausea and/or Vomiting

## 2024-01-13 NOTE — PROGRESS NOTE ADULT - SUBJECTIVE AND OBJECTIVE BOX
INTERVAL HPI/OVERNIGHT EVENTS/SUBJECTIVE:  Pt POD 1 SPT placement. urine with hematuria.     ICU Vital Signs Last 24 Hrs  T(C): 37 (13 Jan 2024 08:30), Max: 37 (13 Jan 2024 08:30)  T(F): 98.6 (13 Jan 2024 08:30), Max: 98.6 (13 Jan 2024 08:30)  HR: 69 (13 Jan 2024 08:30) (53 - 69)  BP: 149/65 (13 Jan 2024 08:30) (120/54 - 152/50)  BP(mean): 93 (13 Jan 2024 08:30) (66 - 93)  ABP: 152/36 (12 Jan 2024 13:15) (152/36 - 159/36)  ABP(mean): 60 (12 Jan 2024 13:15) (60 - 61)  RR: 18 (13 Jan 2024 08:30) (11 - 20)  SpO2: 96% (13 Jan 2024 08:30) (93% - 100%)    O2 Parameters below as of 13 Jan 2024 08:30  Patient On (Oxygen Delivery Method): room air      I&O's Detail    12 Jan 2024 07:01  -  13 Jan 2024 07:00  --------------------------------------------------------  IN:    Oral Fluid: 320 mL    Other (mL): 99 mL  Total IN: 419 mL    OUT:    Indwelling Catheter - Suprapubic (mL): 400 mL  Total OUT: 400 mL    Total NET: 19 mL      MEDICATIONS  (STANDING):  atorvastatin 40 milliGRAM(s) Oral at bedtime  carvedilol 12.5 milliGRAM(s) Oral every 12 hours  cefTRIAXone Injectable. 1000 milliGRAM(s) IV Push every 24 hours  influenza  Vaccine (HIGH DOSE) 0.7 milliLiter(s) IntraMuscular once  sacubitril 49 mG/valsartan 51 mG 1 Tablet(s) Oral two times a day  tamsulosin 0.8 milliGRAM(s) Oral at bedtime    MEDICATIONS  (PRN):  acetaminophen     Tablet .. 650 milliGRAM(s) Oral every 6 hours PRN Temp greater or equal to 38C (100.4F), Mild Pain (1 - 3)  aluminum hydroxide/magnesium hydroxide/simethicone Suspension 30 milliLiter(s) Oral every 4 hours PRN Dyspepsia  melatonin 3 milliGRAM(s) Oral at bedtime PRN Insomnia  ondansetron Injectable 4 milliGRAM(s) IV Push every 8 hours PRN Nausea and/or Vomiting    MISC:     PHYSICAL EXAM:    Gen: NAD, laying comfortably  Neurological: AAOx3  Pulmonary: non-labored   Gastrointestinal: soft, ntnd.   Genitourinary: STP in place, draining hematuria. flushed at bedside small clots removed     LABS:  CBC Full  -  ( 13 Jan 2024 07:15 )  WBC Count : 13.45 K/uL  RBC Count : 3.89 M/uL  Hemoglobin : 11.9 g/dL  Hematocrit : 35.4 %  Platelet Count - Automated : 263 K/uL  Mean Cell Volume : 91.0 fl  Mean Cell Hemoglobin : 30.6 pg  Mean Cell Hemoglobin Concentration : 33.6 gm/dL  Auto Neutrophil # : 11.73 K/uL  Auto Lymphocyte # : 0.84 K/uL  Auto Monocyte # : 0.79 K/uL  Auto Eosinophil # : 0.01 K/uL  Auto Basophil # : 0.02 K/uL  Auto Neutrophil % : 87.3 %  Auto Lymphocyte % : 6.2 %  Auto Monocyte % : 5.9 %  Auto Eosinophil % : 0.1 %  Auto Basophil % : 0.1 %    01-13    137  |  105  |  36.7<H>  ----------------------------<  97  4.6   |  22.0  |  1.40<H>    Ca    7.6<L>      13 Jan 2024 07:15  Phos  2.4     01-13  Mg     2.1     01-13    TPro  6.0<L>  /  Alb  3.0<L>  /  TBili  0.4  /  DBili  x   /  AST  24  /  ALT  12  /  AlkPhos  78  01-13    PT/INR - ( 12 Jan 2024 00:13 )   PT: 12.9 sec;   INR: 1.17 ratio         PTT - ( 12 Jan 2024 00:13 )  PTT:34.1 sec  Urinalysis Basic - ( 13 Jan 2024 07:15 )    Color: x / Appearance: x / SG: x / pH: x  Gluc: 97 mg/dL / Ketone: x  / Bili: x / Urobili: x   Blood: x / Protein: x / Nitrite: x   Leuk Esterase: x / RBC: x / WBC x   Sq Epi: x / Non Sq Epi: x / Bacteria: x    LIVER FUNCTIONS - ( 13 Jan 2024 07:15 )  Alb: 3.0 g/dL / Pro: 6.0 g/dL / ALK PHOS: 78 U/L / ALT: 12 U/L / AST: 24 U/L / GGT: x           ASSESSMENT/PLAN:  78yMale with gross hematuria, very enlarged prostate  - POD 1 SPT , monitor outputs  - trend renal function  - care per primary team

## 2024-01-14 LAB
ANION GAP SERPL CALC-SCNC: 12 MMOL/L — SIGNIFICANT CHANGE UP (ref 5–17)
ANION GAP SERPL CALC-SCNC: 12 MMOL/L — SIGNIFICANT CHANGE UP (ref 5–17)
BASOPHILS # BLD AUTO: 0.02 K/UL — SIGNIFICANT CHANGE UP (ref 0–0.2)
BASOPHILS # BLD AUTO: 0.02 K/UL — SIGNIFICANT CHANGE UP (ref 0–0.2)
BASOPHILS NFR BLD AUTO: 0.3 % — SIGNIFICANT CHANGE UP (ref 0–2)
BASOPHILS NFR BLD AUTO: 0.3 % — SIGNIFICANT CHANGE UP (ref 0–2)
BUN SERPL-MCNC: 29 MG/DL — HIGH (ref 8–20)
BUN SERPL-MCNC: 29 MG/DL — HIGH (ref 8–20)
CA-I BLD-SCNC: 1.06 MMOL/L — LOW (ref 1.15–1.33)
CA-I BLD-SCNC: 1.06 MMOL/L — LOW (ref 1.15–1.33)
CALCIUM SERPL-MCNC: 7.6 MG/DL — LOW (ref 8.4–10.5)
CALCIUM SERPL-MCNC: 7.6 MG/DL — LOW (ref 8.4–10.5)
CHLORIDE SERPL-SCNC: 105 MMOL/L — SIGNIFICANT CHANGE UP (ref 96–108)
CHLORIDE SERPL-SCNC: 105 MMOL/L — SIGNIFICANT CHANGE UP (ref 96–108)
CO2 SERPL-SCNC: 22 MMOL/L — SIGNIFICANT CHANGE UP (ref 22–29)
CO2 SERPL-SCNC: 22 MMOL/L — SIGNIFICANT CHANGE UP (ref 22–29)
CREAT SERPL-MCNC: 1.17 MG/DL — SIGNIFICANT CHANGE UP (ref 0.5–1.3)
CREAT SERPL-MCNC: 1.17 MG/DL — SIGNIFICANT CHANGE UP (ref 0.5–1.3)
EGFR: 63 ML/MIN/1.73M2 — SIGNIFICANT CHANGE UP
EGFR: 63 ML/MIN/1.73M2 — SIGNIFICANT CHANGE UP
EOSINOPHIL # BLD AUTO: 0.17 K/UL — SIGNIFICANT CHANGE UP (ref 0–0.5)
EOSINOPHIL # BLD AUTO: 0.17 K/UL — SIGNIFICANT CHANGE UP (ref 0–0.5)
EOSINOPHIL NFR BLD AUTO: 2.2 % — SIGNIFICANT CHANGE UP (ref 0–6)
EOSINOPHIL NFR BLD AUTO: 2.2 % — SIGNIFICANT CHANGE UP (ref 0–6)
GLUCOSE SERPL-MCNC: 89 MG/DL — SIGNIFICANT CHANGE UP (ref 70–99)
GLUCOSE SERPL-MCNC: 89 MG/DL — SIGNIFICANT CHANGE UP (ref 70–99)
HCT VFR BLD CALC: 32.5 % — LOW (ref 39–50)
HCT VFR BLD CALC: 32.5 % — LOW (ref 39–50)
HGB BLD-MCNC: 11.1 G/DL — LOW (ref 13–17)
HGB BLD-MCNC: 11.1 G/DL — LOW (ref 13–17)
IMM GRANULOCYTES NFR BLD AUTO: 0.7 % — SIGNIFICANT CHANGE UP (ref 0–0.9)
IMM GRANULOCYTES NFR BLD AUTO: 0.7 % — SIGNIFICANT CHANGE UP (ref 0–0.9)
LYMPHOCYTES # BLD AUTO: 1.25 K/UL — SIGNIFICANT CHANGE UP (ref 1–3.3)
LYMPHOCYTES # BLD AUTO: 1.25 K/UL — SIGNIFICANT CHANGE UP (ref 1–3.3)
LYMPHOCYTES # BLD AUTO: 16.5 % — SIGNIFICANT CHANGE UP (ref 13–44)
LYMPHOCYTES # BLD AUTO: 16.5 % — SIGNIFICANT CHANGE UP (ref 13–44)
MAGNESIUM SERPL-MCNC: 2.4 MG/DL — SIGNIFICANT CHANGE UP (ref 1.6–2.6)
MAGNESIUM SERPL-MCNC: 2.4 MG/DL — SIGNIFICANT CHANGE UP (ref 1.6–2.6)
MCHC RBC-ENTMCNC: 30.9 PG — SIGNIFICANT CHANGE UP (ref 27–34)
MCHC RBC-ENTMCNC: 30.9 PG — SIGNIFICANT CHANGE UP (ref 27–34)
MCHC RBC-ENTMCNC: 34.2 GM/DL — SIGNIFICANT CHANGE UP (ref 32–36)
MCHC RBC-ENTMCNC: 34.2 GM/DL — SIGNIFICANT CHANGE UP (ref 32–36)
MCV RBC AUTO: 90.5 FL — SIGNIFICANT CHANGE UP (ref 80–100)
MCV RBC AUTO: 90.5 FL — SIGNIFICANT CHANGE UP (ref 80–100)
MONOCYTES # BLD AUTO: 0.57 K/UL — SIGNIFICANT CHANGE UP (ref 0–0.9)
MONOCYTES # BLD AUTO: 0.57 K/UL — SIGNIFICANT CHANGE UP (ref 0–0.9)
MONOCYTES NFR BLD AUTO: 7.5 % — SIGNIFICANT CHANGE UP (ref 2–14)
MONOCYTES NFR BLD AUTO: 7.5 % — SIGNIFICANT CHANGE UP (ref 2–14)
NEUTROPHILS # BLD AUTO: 5.52 K/UL — SIGNIFICANT CHANGE UP (ref 1.8–7.4)
NEUTROPHILS # BLD AUTO: 5.52 K/UL — SIGNIFICANT CHANGE UP (ref 1.8–7.4)
NEUTROPHILS NFR BLD AUTO: 72.8 % — SIGNIFICANT CHANGE UP (ref 43–77)
NEUTROPHILS NFR BLD AUTO: 72.8 % — SIGNIFICANT CHANGE UP (ref 43–77)
PLATELET # BLD AUTO: 258 K/UL — SIGNIFICANT CHANGE UP (ref 150–400)
PLATELET # BLD AUTO: 258 K/UL — SIGNIFICANT CHANGE UP (ref 150–400)
POTASSIUM SERPL-MCNC: 4 MMOL/L — SIGNIFICANT CHANGE UP (ref 3.5–5.3)
POTASSIUM SERPL-MCNC: 4 MMOL/L — SIGNIFICANT CHANGE UP (ref 3.5–5.3)
POTASSIUM SERPL-SCNC: 4 MMOL/L — SIGNIFICANT CHANGE UP (ref 3.5–5.3)
POTASSIUM SERPL-SCNC: 4 MMOL/L — SIGNIFICANT CHANGE UP (ref 3.5–5.3)
RBC # BLD: 3.59 M/UL — LOW (ref 4.2–5.8)
RBC # BLD: 3.59 M/UL — LOW (ref 4.2–5.8)
RBC # FLD: 12.8 % — SIGNIFICANT CHANGE UP (ref 10.3–14.5)
RBC # FLD: 12.8 % — SIGNIFICANT CHANGE UP (ref 10.3–14.5)
SODIUM SERPL-SCNC: 139 MMOL/L — SIGNIFICANT CHANGE UP (ref 135–145)
SODIUM SERPL-SCNC: 139 MMOL/L — SIGNIFICANT CHANGE UP (ref 135–145)
WBC # BLD: 7.58 K/UL — SIGNIFICANT CHANGE UP (ref 3.8–10.5)
WBC # BLD: 7.58 K/UL — SIGNIFICANT CHANGE UP (ref 3.8–10.5)
WBC # FLD AUTO: 7.58 K/UL — SIGNIFICANT CHANGE UP (ref 3.8–10.5)
WBC # FLD AUTO: 7.58 K/UL — SIGNIFICANT CHANGE UP (ref 3.8–10.5)

## 2024-01-14 PROCEDURE — 99233 SBSQ HOSP IP/OBS HIGH 50: CPT

## 2024-01-14 RX ORDER — CALCIUM GLUCONATE 100 MG/ML
2 VIAL (ML) INTRAVENOUS ONCE
Refills: 0 | Status: COMPLETED | OUTPATIENT
Start: 2024-01-14 | End: 2024-01-14

## 2024-01-14 RX ADMIN — ATORVASTATIN CALCIUM 40 MILLIGRAM(S): 80 TABLET, FILM COATED ORAL at 21:14

## 2024-01-14 RX ADMIN — SACUBITRIL AND VALSARTAN 1 TABLET(S): 24; 26 TABLET, FILM COATED ORAL at 05:50

## 2024-01-14 RX ADMIN — CEFTRIAXONE 1000 MILLIGRAM(S): 500 INJECTION, POWDER, FOR SOLUTION INTRAMUSCULAR; INTRAVENOUS at 21:14

## 2024-01-14 RX ADMIN — Medication 200 GRAM(S): at 13:13

## 2024-01-14 RX ADMIN — TAMSULOSIN HYDROCHLORIDE 0.8 MILLIGRAM(S): 0.4 CAPSULE ORAL at 21:14

## 2024-01-14 RX ADMIN — GABAPENTIN 300 MILLIGRAM(S): 400 CAPSULE ORAL at 13:11

## 2024-01-14 RX ADMIN — SACUBITRIL AND VALSARTAN 1 TABLET(S): 24; 26 TABLET, FILM COATED ORAL at 17:57

## 2024-01-14 RX ADMIN — GABAPENTIN 300 MILLIGRAM(S): 400 CAPSULE ORAL at 21:13

## 2024-01-14 RX ADMIN — GABAPENTIN 300 MILLIGRAM(S): 400 CAPSULE ORAL at 05:50

## 2024-01-14 NOTE — PROGRESS NOTE ADULT - SUBJECTIVE AND OBJECTIVE BOX
INTERVAL HPI/OVERNIGHT EVENTS/SUBJECTIVE:  Pt seen and examined with attending. SPT flushed at bedside and is adequately draining. voided through urethra last night and SPT draining.     ICU Vital Signs Last 24 Hrs  T(C): 36.4 (14 Jan 2024 07:59), Max: 37 (13 Jan 2024 21:06)  T(F): 97.5 (14 Jan 2024 07:59), Max: 98.6 (13 Jan 2024 21:06)  HR: 63 (14 Jan 2024 07:59) (55 - 73)  BP: 127/55 (14 Jan 2024 07:59) (124/55 - 154/64)  BP(mean): 94 (13 Jan 2024 18:07) (94 - 94)  ABP: --  ABP(mean): --  RR: 18 (14 Jan 2024 07:59) (18 - 18)  SpO2: 99% (14 Jan 2024 07:59) (93% - 99%)    O2 Parameters below as of 14 Jan 2024 07:59  Patient On (Oxygen Delivery Method): room air    I&O's Detail    13 Jan 2024 07:01  -  14 Jan 2024 07:00  --------------------------------------------------------  IN:    Oral Fluid: 280 mL  Total IN: 280 mL    OUT:    Indwelling Catheter - Suprapubic (mL): 100 mL    Voided (mL): 600 mL  Total OUT: 700 mL    Total NET: -420 mL    MEDICATIONS  (STANDING):  atorvastatin 40 milliGRAM(s) Oral at bedtime  calcium gluconate IVPB 2 Gram(s) IV Intermittent once  carvedilol 12.5 milliGRAM(s) Oral every 12 hours  cefTRIAXone Injectable. 1000 milliGRAM(s) IV Push every 24 hours  gabapentin 300 milliGRAM(s) Oral three times a day  influenza  Vaccine (HIGH DOSE) 0.7 milliLiter(s) IntraMuscular once  sacubitril 49 mG/valsartan 51 mG 1 Tablet(s) Oral two times a day  tamsulosin 0.8 milliGRAM(s) Oral at bedtime    MEDICATIONS  (PRN):  acetaminophen     Tablet .. 650 milliGRAM(s) Oral every 6 hours PRN Temp greater or equal to 38C (100.4F), Mild Pain (1 - 3)  aluminum hydroxide/magnesium hydroxide/simethicone Suspension 30 milliLiter(s) Oral every 4 hours PRN Dyspepsia  melatonin 3 milliGRAM(s) Oral at bedtime PRN Insomnia  ondansetron Injectable 4 milliGRAM(s) IV Push every 8 hours PRN Nausea and/or Vomiting      MISC:     PHYSICAL EXAM:  Gen: NAD, laying comfortably  Neurological: AAOx3  Pulmonary: non-labored   Gastrointestinal: soft, ntnd.   Genitourinary: STP in place, draining hematuria. flushed at bedside     LABS:  CBC Full  -  ( 14 Jan 2024 07:15 )  WBC Count : 7.58 K/uL  RBC Count : 3.59 M/uL  Hemoglobin : 11.1 g/dL  Hematocrit : 32.5 %  Platelet Count - Automated : 258 K/uL  Mean Cell Volume : 90.5 fl  Mean Cell Hemoglobin : 30.9 pg  Mean Cell Hemoglobin Concentration : 34.2 gm/dL  Auto Neutrophil # : 5.52 K/uL  Auto Lymphocyte # : 1.25 K/uL  Auto Monocyte # : 0.57 K/uL  Auto Eosinophil # : 0.17 K/uL  Auto Basophil # : 0.02 K/uL  Auto Neutrophil % : 72.8 %  Auto Lymphocyte % : 16.5 %  Auto Monocyte % : 7.5 %  Auto Eosinophil % : 2.2 %  Auto Basophil % : 0.3 %    01-14    139  |  105  |  29.0<H>  ----------------------------<  89  4.0   |  22.0  |  1.17    Ca    7.6<L>      14 Jan 2024 07:15  Phos  2.4     01-13  Mg     2.4     01-14    TPro  6.0<L>  /  Alb  3.0<L>  /  TBili  0.4  /  DBili  x   /  AST  24  /  ALT  12  /  AlkPhos  78  01-13      Urinalysis Basic - ( 14 Jan 2024 07:15 )    Color: x / Appearance: x / SG: x / pH: x  Gluc: 89 mg/dL / Ketone: x  / Bili: x / Urobili: x   Blood: x / Protein: x / Nitrite: x   Leuk Esterase: x / RBC: x / WBC x   Sq Epi: x / Non Sq Epi: x / Bacteria: x      RECENT CULTURES:  01-12 .Blood Blood XXXX XXXX   No growth at 24 hours    01-12 .Blood Blood XXXX XXXX   No growth at 24 hours    01-12 Clean Catch Clean Catch (Midstream) XXXX XXXX   10,000 - 49,000 CFU/mL Morganella morganii  10,000 - 49,000 CFU/mL Enterococcus faecalis  <10,000 CFU/ml Normal Urogenital celia present        LIVER FUNCTIONS - ( 13 Jan 2024 07:15 )  Alb: 3.0 g/dL / Pro: 6.0 g/dL / ALK PHOS: 78 U/L / ALT: 12 U/L / AST: 24 U/L / GGT: x           ASSESSMENT/PLAN:   78yMale with gross hematuria, very enlarged prostate  - POD 2 SPT , monitor outputs.   - trend renal function  - care per primary team

## 2024-01-14 NOTE — PROGRESS NOTE ADULT - SUBJECTIVE AND OBJECTIVE BOX
Urinary tract infection    HPI:  79 y/o M with a PMH of HTN, kidney cancer, pericardial effusion, BPH, NICM, colon CA s/p resection and chemotherapy approximately 3 years ago, CHF with a LVEF of 25 presents with hematuria. Initially presented to the ED on 1/5 with flu like symptoms and reported to have hematuria that resolved upon presentation. CT scan from visit showed a renal lesion. States has had several episodes of intermittent hematuria with clots today and yesterday.  Also reports increase strain during urination. Endorses increasing fatigue and myalgias over the last few days.  Denies fever, chills, back pain, chest pain, SOB, recent travel or sick contact.    In ED, received 1x ceftriaxone.  (12 Jan 2024 04:59)    Interval History:  Patient was seen and examined at bedside around 10:30 am.  Feels better today.  Voiding mostly through urethra.  Passing clots intermittently.   Minimal output from supra-pubic catheter.   Denies abdominal pain, nausea or vomiting.   Denies chest pain, palpitations or shortness of breath.    ROS:  As per interval history otherwise unremarkable.    PHYSICAL EXAM:  Vital Signs   T(C): 36.4 (14 Jan 2024 07:59), Max: 37 (13 Jan 2024 21:06)  T(F): 97.5 (14 Jan 2024 07:59), Max: 98.6 (13 Jan 2024 21:06)  HR: 63 (14 Jan 2024 07:59) (55 - 73)  BP: 127/55 (14 Jan 2024 07:59) (124/55 - 154/64)  BP(mean): 94 (13 Jan 2024 18:07) (94 - 94)  RR: 18 (14 Jan 2024 07:59) (18 - 18)  SpO2: 99% (14 Jan 2024 07:59) (93% - 99%)  Parameters below as of 14 Jan 2024 07:59  Patient On (Oxygen Delivery Method): room air  General: Elderly male lying in bed comfortably. No acute distress  HEENT: EOMI. Clear conjunctivae. Moist mucus membrane  Neck: Supple.   Chest: Good air entry. No wheezing, rales or rhonchi.   Heart: Normal S1 & S2. RRR.   Abdomen: Non distended. Soft. Non-tender. + BS. Suprapubic catheter in place.   Ext: No pedal edema. No calf tenderness   Neuro: Awake and alert. No focal deficit. Speech clear.   Skin: Warm and Dry  Psychiatry: Normal mood and affect    I&O's Summary    13 Jan 2024 07:01  -  14 Jan 2024 07:00  --------------------------------------------------------  IN: 280 mL / OUT: 700 mL / NET: -420 mL    LABS:                      11.1   7.58  )-----------( 258      ( 14 Jan 2024 07:15 )             32.5     01-14    139  |  105  |  29.0<H>  ----------------------------<  89  4.0   |  22.0  |  1.17    Ca    7.6<L>      14 Jan 2024 07:15  Phos  2.4     01-13  Mg     2.4     01-14    TPro  6.0<L>  /  Alb  3.0<L>  /  TBili  0.4  /  DBili  x   /  AST  24  /  ALT  12  /  AlkPhos  78  01-13    Blood Culture: 01-12 @ 08:14  Organism --  Gram Stain Blood -- Gram Stain --  Specimen Source .Blood Blood  Culture-Blood --    01-12 @ 08:10  Organism --  Gram Stain Blood -- Gram Stain --  Specimen Source .Blood Blood  Culture-Blood --    01-12 @ 00:43  Organism --  Gram Stain Blood -- Gram Stain --  Specimen Source Clean Catch Clean Catch (Midstream)  Culture-Blood --    RADIOLOGY & ADDITIONAL STUDIES:  Reviewed     MEDICATIONS  (STANDING):  atorvastatin 40 milliGRAM(s) Oral at bedtime  carvedilol 12.5 milliGRAM(s) Oral every 12 hours  cefTRIAXone Injectable. 1000 milliGRAM(s) IV Push every 24 hours  gabapentin 300 milliGRAM(s) Oral three times a day  influenza  Vaccine (HIGH DOSE) 0.7 milliLiter(s) IntraMuscular once  sacubitril 49 mG/valsartan 51 mG 1 Tablet(s) Oral two times a day  tamsulosin 0.8 milliGRAM(s) Oral at bedtime    MEDICATIONS  (PRN):  acetaminophen     Tablet .. 650 milliGRAM(s) Oral every 6 hours PRN Temp greater or equal to 38C (100.4F), Mild Pain (1 - 3)  aluminum hydroxide/magnesium hydroxide/simethicone Suspension 30 milliLiter(s) Oral every 4 hours PRN Dyspepsia  melatonin 3 milliGRAM(s) Oral at bedtime PRN Insomnia  ondansetron Injectable 4 milliGRAM(s) IV Push every 8 hours PRN Nausea and/or Vomiting         Urinary tract infection    HPI:  77 y/o M with a PMH of HTN, kidney cancer, pericardial effusion, BPH, NICM, colon CA s/p resection and chemotherapy approximately 3 years ago, CHF with a LVEF of 25 presents with hematuria. Initially presented to the ED on 1/5 with flu like symptoms and reported to have hematuria that resolved upon presentation. CT scan from visit showed a renal lesion. States has had several episodes of intermittent hematuria with clots today and yesterday.  Also reports increase strain during urination. Endorses increasing fatigue and myalgias over the last few days.  Denies fever, chills, back pain, chest pain, SOB, recent travel or sick contact.    In ED, received 1x ceftriaxone.  (12 Jan 2024 04:59)    Interval History:  Patient was seen and examined at bedside around 10:30 am.  Feels better today.  Voiding mostly through urethra.  Passing clots intermittently.   Minimal output from supra-pubic catheter.   Denies abdominal pain, nausea or vomiting.   Denies chest pain, palpitations or shortness of breath.    ROS:  As per interval history otherwise unremarkable.    PHYSICAL EXAM:  Vital Signs   T(C): 36.4 (14 Jan 2024 07:59), Max: 37 (13 Jan 2024 21:06)  T(F): 97.5 (14 Jan 2024 07:59), Max: 98.6 (13 Jan 2024 21:06)  HR: 63 (14 Jan 2024 07:59) (55 - 73)  BP: 127/55 (14 Jan 2024 07:59) (124/55 - 154/64)  BP(mean): 94 (13 Jan 2024 18:07) (94 - 94)  RR: 18 (14 Jan 2024 07:59) (18 - 18)  SpO2: 99% (14 Jan 2024 07:59) (93% - 99%)  Parameters below as of 14 Jan 2024 07:59  Patient On (Oxygen Delivery Method): room air  General: Elderly male lying in bed comfortably. No acute distress  HEENT: EOMI. Clear conjunctivae. Moist mucus membrane  Neck: Supple.   Chest: Good air entry. No wheezing, rales or rhonchi.   Heart: Normal S1 & S2. RRR.   Abdomen: Non distended. Soft. Non-tender. + BS. Suprapubic catheter in place.   Ext: No pedal edema. No calf tenderness   Neuro: Awake and alert. No focal deficit. Speech clear.   Skin: Warm and Dry  Psychiatry: Normal mood and affect    I&O's Summary    13 Jan 2024 07:01  -  14 Jan 2024 07:00  --------------------------------------------------------  IN: 280 mL / OUT: 700 mL / NET: -420 mL    LABS:                      11.1   7.58  )-----------( 258      ( 14 Jan 2024 07:15 )             32.5     01-14    139  |  105  |  29.0<H>  ----------------------------<  89  4.0   |  22.0  |  1.17    Ca    7.6<L>      14 Jan 2024 07:15  Phos  2.4     01-13  Mg     2.4     01-14    TPro  6.0<L>  /  Alb  3.0<L>  /  TBili  0.4  /  DBili  x   /  AST  24  /  ALT  12  /  AlkPhos  78  01-13    Blood Culture: 01-12 @ 08:14  Organism --  Gram Stain Blood -- Gram Stain --  Specimen Source .Blood Blood  Culture-Blood --    01-12 @ 08:10  Organism --  Gram Stain Blood -- Gram Stain --  Specimen Source .Blood Blood  Culture-Blood --    01-12 @ 00:43  Organism --  Gram Stain Blood -- Gram Stain --  Specimen Source Clean Catch Clean Catch (Midstream)  Culture-Blood --    RADIOLOGY & ADDITIONAL STUDIES:  Reviewed     MEDICATIONS  (STANDING):  atorvastatin 40 milliGRAM(s) Oral at bedtime  carvedilol 12.5 milliGRAM(s) Oral every 12 hours  cefTRIAXone Injectable. 1000 milliGRAM(s) IV Push every 24 hours  gabapentin 300 milliGRAM(s) Oral three times a day  influenza  Vaccine (HIGH DOSE) 0.7 milliLiter(s) IntraMuscular once  sacubitril 49 mG/valsartan 51 mG 1 Tablet(s) Oral two times a day  tamsulosin 0.8 milliGRAM(s) Oral at bedtime    MEDICATIONS  (PRN):  acetaminophen     Tablet .. 650 milliGRAM(s) Oral every 6 hours PRN Temp greater or equal to 38C (100.4F), Mild Pain (1 - 3)  aluminum hydroxide/magnesium hydroxide/simethicone Suspension 30 milliLiter(s) Oral every 4 hours PRN Dyspepsia  melatonin 3 milliGRAM(s) Oral at bedtime PRN Insomnia  ondansetron Injectable 4 milliGRAM(s) IV Push every 8 hours PRN Nausea and/or Vomiting

## 2024-01-14 NOTE — PROGRESS NOTE ADULT - ASSESSMENT
79 y/o M with a PMH of Kidney Cancer, BPH, Pericardial Effusion, NICM, HTN, colon CA s/p resection and chemotherapy approximately 3 years ago presented with hematuria found to have UTI with obstructive uropathy.    #UTI with hematuria  #Obstructive uropathy  - POC US Bladder highlighted mixed echogenicity at inferior aspect, concerning for blood products  - Urology/ED unable to place baum at bedside  - s/p cystoscopy with suprapubic catheter placement on 1/12/24  - Urology Following  - s/p 1x ceftriaxone  - c/w ceftriaxone  - Continue Flomax   - f/u UCx  -Blood cultures negative so far    #LINDEN   -likely 2/2 to Obstructive uropathy  -Avoid nephrotoxic medications  -Monitor     #Kidney Cancer  - previous CT 1/5 shows renal lesion  - outpatient Urology follow up    #Acute Blood Loss Anemia  -Likely due to hematuria  -Monitor H&H  -Transfuse for Hb < 7    #Abnormal EKG  -Chronic TWI   -Asymptomatic   -Cardio consult appreciated     #HTN  #NICM  - Strict I/O  - c/w atorvastatin   - c/w coreg  - c/w entresto (monitor renal function)   - c/w trend Electrolytes, K>4, Mg>2, Phos>3  - will hold lasix, spiron and jardiance   - ECHO noted. LV function improving on current meds.      #Colon cancer s/p resection and chemotherapy  - f/u outpatient    DVT Prophylaxis -- Venodyne    Dispo: Likely home once stable.      77 y/o M with a PMH of Kidney Cancer, BPH, Pericardial Effusion, NICM, HTN, colon CA s/p resection and chemotherapy approximately 3 years ago presented with hematuria found to have UTI with obstructive uropathy.    #UTI with hematuria  #Obstructive uropathy  - POC US Bladder highlighted mixed echogenicity at inferior aspect, concerning for blood products  - Urology/ED unable to place baum at bedside  - s/p cystoscopy with suprapubic catheter placement on 1/12/24  - Urology Following  - s/p 1x ceftriaxone  - c/w ceftriaxone  - Continue Flomax   - f/u UCx  -Blood cultures negative so far    #LINDEN   -likely 2/2 to Obstructive uropathy  -Avoid nephrotoxic medications  -Monitor     #Kidney Cancer  - previous CT 1/5 shows renal lesion  - outpatient Urology follow up    #Acute Blood Loss Anemia  -Likely due to hematuria  -Monitor H&H  -Transfuse for Hb < 7    #Abnormal EKG  -Chronic TWI   -Asymptomatic   -Cardio consult appreciated     #HTN  #NICM  - Strict I/O  - c/w atorvastatin   - c/w coreg  - c/w entresto (monitor renal function)   - c/w trend Electrolytes, K>4, Mg>2, Phos>3  - will hold lasix, spiron and jardiance   - ECHO noted. LV function improving on current meds.      #Colon cancer s/p resection and chemotherapy  - f/u outpatient    DVT Prophylaxis -- Venodyne    Dispo: Likely home once stable.      79 y/o M with a PMH of Kidney Cancer, BPH, Pericardial Effusion, NICM, HTN, colon CA s/p resection and chemotherapy approximately 3 years ago presented with hematuria found to have UTI with obstructive uropathy.    #UTI with hematuria  #Obstructive uropathy  - POC US Bladder highlighted mixed echogenicity at inferior aspect, concerning for blood products  - Urology/ED unable to place baum at bedside  - s/p cystoscopy with suprapubic catheter placement on 1/12/24  - Urology Following  - s/p 1x ceftriaxone  - c/w ceftriaxone  - Continue Flomax   - f/u UCx  -Blood cultures negative so far    #LINDEN   -likely 2/2 to Obstructive uropathy  -Avoid nephrotoxic medications  -Monitor     #Kidney Cancer  - previous CT 1/5 shows renal lesion  - outpatient Urology follow up    #Acute Blood Loss Anemia  -Likely due to hematuria  -Monitor H&H  -Transfuse for Hb < 7    #Abnormal EKG  -Chronic TWI   -Asymptomatic   -Cardio consult appreciated     #HTN  #NICM  - Strict I/O  - c/w atorvastatin   - c/w coreg  - c/w entresto (monitor renal function)   - c/w trend Electrolytes, K>4, Mg>2, Phos>3  - will hold lasix, spiron and jardiance   - ECHO noted. LV function improving on current meds.      #Colon cancer s/p resection and chemotherapy  - f/u outpatient    #Hypocalcemia  - Replete    DVT Prophylaxis -- Venodyne    Dispo: Likely home once stable.

## 2024-01-15 LAB
-  AMOXICILLIN/CLAVULANIC ACID: SIGNIFICANT CHANGE UP
-  AMOXICILLIN/CLAVULANIC ACID: SIGNIFICANT CHANGE UP
-  AMPICILLIN/SULBACTAM: SIGNIFICANT CHANGE UP
-  AMPICILLIN/SULBACTAM: SIGNIFICANT CHANGE UP
-  AMPICILLIN: SIGNIFICANT CHANGE UP
-  AZTREONAM: SIGNIFICANT CHANGE UP
-  AZTREONAM: SIGNIFICANT CHANGE UP
-  CEFAZOLIN: SIGNIFICANT CHANGE UP
-  CEFAZOLIN: SIGNIFICANT CHANGE UP
-  CEFEPIME: SIGNIFICANT CHANGE UP
-  CEFEPIME: SIGNIFICANT CHANGE UP
-  CEFOXITIN: SIGNIFICANT CHANGE UP
-  CEFOXITIN: SIGNIFICANT CHANGE UP
-  CEFTRIAXONE: SIGNIFICANT CHANGE UP
-  CEFTRIAXONE: SIGNIFICANT CHANGE UP
-  CIPROFLOXACIN: SIGNIFICANT CHANGE UP
-  ERTAPENEM: SIGNIFICANT CHANGE UP
-  ERTAPENEM: SIGNIFICANT CHANGE UP
-  GENTAMICIN: SIGNIFICANT CHANGE UP
-  GENTAMICIN: SIGNIFICANT CHANGE UP
-  LEVOFLOXACIN: SIGNIFICANT CHANGE UP
-  MEROPENEM: SIGNIFICANT CHANGE UP
-  MEROPENEM: SIGNIFICANT CHANGE UP
-  NITROFURANTOIN: SIGNIFICANT CHANGE UP
-  PIPERACILLIN/TAZOBACTAM: SIGNIFICANT CHANGE UP
-  PIPERACILLIN/TAZOBACTAM: SIGNIFICANT CHANGE UP
-  TETRACYCLINE: SIGNIFICANT CHANGE UP
-  TETRACYCLINE: SIGNIFICANT CHANGE UP
-  TOBRAMYCIN: SIGNIFICANT CHANGE UP
-  TOBRAMYCIN: SIGNIFICANT CHANGE UP
-  TRIMETHOPRIM/SULFAMETHOXAZOLE: SIGNIFICANT CHANGE UP
-  TRIMETHOPRIM/SULFAMETHOXAZOLE: SIGNIFICANT CHANGE UP
-  VANCOMYCIN: SIGNIFICANT CHANGE UP
-  VANCOMYCIN: SIGNIFICANT CHANGE UP
ANION GAP SERPL CALC-SCNC: 12 MMOL/L — SIGNIFICANT CHANGE UP (ref 5–17)
ANION GAP SERPL CALC-SCNC: 12 MMOL/L — SIGNIFICANT CHANGE UP (ref 5–17)
BUN SERPL-MCNC: 21.8 MG/DL — HIGH (ref 8–20)
BUN SERPL-MCNC: 21.8 MG/DL — HIGH (ref 8–20)
CALCIUM SERPL-MCNC: 7.8 MG/DL — LOW (ref 8.4–10.5)
CALCIUM SERPL-MCNC: 7.8 MG/DL — LOW (ref 8.4–10.5)
CHLORIDE SERPL-SCNC: 106 MMOL/L — SIGNIFICANT CHANGE UP (ref 96–108)
CHLORIDE SERPL-SCNC: 106 MMOL/L — SIGNIFICANT CHANGE UP (ref 96–108)
CO2 SERPL-SCNC: 21 MMOL/L — LOW (ref 22–29)
CO2 SERPL-SCNC: 21 MMOL/L — LOW (ref 22–29)
CREAT SERPL-MCNC: 1.1 MG/DL — SIGNIFICANT CHANGE UP (ref 0.5–1.3)
CREAT SERPL-MCNC: 1.1 MG/DL — SIGNIFICANT CHANGE UP (ref 0.5–1.3)
CULTURE RESULTS: ABNORMAL
CULTURE RESULTS: ABNORMAL
EGFR: 68 ML/MIN/1.73M2 — SIGNIFICANT CHANGE UP
EGFR: 68 ML/MIN/1.73M2 — SIGNIFICANT CHANGE UP
GLUCOSE SERPL-MCNC: 99 MG/DL — SIGNIFICANT CHANGE UP (ref 70–99)
GLUCOSE SERPL-MCNC: 99 MG/DL — SIGNIFICANT CHANGE UP (ref 70–99)
HCT VFR BLD CALC: 35.7 % — LOW (ref 39–50)
HCT VFR BLD CALC: 35.7 % — LOW (ref 39–50)
HGB BLD-MCNC: 11.8 G/DL — LOW (ref 13–17)
HGB BLD-MCNC: 11.8 G/DL — LOW (ref 13–17)
MAGNESIUM SERPL-MCNC: 2.2 MG/DL — SIGNIFICANT CHANGE UP (ref 1.6–2.6)
MAGNESIUM SERPL-MCNC: 2.2 MG/DL — SIGNIFICANT CHANGE UP (ref 1.6–2.6)
MCHC RBC-ENTMCNC: 30.4 PG — SIGNIFICANT CHANGE UP (ref 27–34)
MCHC RBC-ENTMCNC: 30.4 PG — SIGNIFICANT CHANGE UP (ref 27–34)
MCHC RBC-ENTMCNC: 33.1 GM/DL — SIGNIFICANT CHANGE UP (ref 32–36)
MCHC RBC-ENTMCNC: 33.1 GM/DL — SIGNIFICANT CHANGE UP (ref 32–36)
MCV RBC AUTO: 92 FL — SIGNIFICANT CHANGE UP (ref 80–100)
MCV RBC AUTO: 92 FL — SIGNIFICANT CHANGE UP (ref 80–100)
METHOD TYPE: SIGNIFICANT CHANGE UP
ORGANISM # SPEC MICROSCOPIC CNT: ABNORMAL
ORGANISM # SPEC MICROSCOPIC CNT: SIGNIFICANT CHANGE UP
ORGANISM # SPEC MICROSCOPIC CNT: SIGNIFICANT CHANGE UP
PLATELET # BLD AUTO: 309 K/UL — SIGNIFICANT CHANGE UP (ref 150–400)
PLATELET # BLD AUTO: 309 K/UL — SIGNIFICANT CHANGE UP (ref 150–400)
POTASSIUM SERPL-MCNC: 4.2 MMOL/L — SIGNIFICANT CHANGE UP (ref 3.5–5.3)
POTASSIUM SERPL-MCNC: 4.2 MMOL/L — SIGNIFICANT CHANGE UP (ref 3.5–5.3)
POTASSIUM SERPL-SCNC: 4.2 MMOL/L — SIGNIFICANT CHANGE UP (ref 3.5–5.3)
POTASSIUM SERPL-SCNC: 4.2 MMOL/L — SIGNIFICANT CHANGE UP (ref 3.5–5.3)
RBC # BLD: 3.88 M/UL — LOW (ref 4.2–5.8)
RBC # BLD: 3.88 M/UL — LOW (ref 4.2–5.8)
RBC # FLD: 13.1 % — SIGNIFICANT CHANGE UP (ref 10.3–14.5)
RBC # FLD: 13.1 % — SIGNIFICANT CHANGE UP (ref 10.3–14.5)
SODIUM SERPL-SCNC: 139 MMOL/L — SIGNIFICANT CHANGE UP (ref 135–145)
SODIUM SERPL-SCNC: 139 MMOL/L — SIGNIFICANT CHANGE UP (ref 135–145)
SPECIMEN SOURCE: SIGNIFICANT CHANGE UP
SPECIMEN SOURCE: SIGNIFICANT CHANGE UP
WBC # BLD: 6.76 K/UL — SIGNIFICANT CHANGE UP (ref 3.8–10.5)
WBC # BLD: 6.76 K/UL — SIGNIFICANT CHANGE UP (ref 3.8–10.5)
WBC # FLD AUTO: 6.76 K/UL — SIGNIFICANT CHANGE UP (ref 3.8–10.5)
WBC # FLD AUTO: 6.76 K/UL — SIGNIFICANT CHANGE UP (ref 3.8–10.5)

## 2024-01-15 PROCEDURE — 76857 US EXAM PELVIC LIMITED: CPT | Mod: 26

## 2024-01-15 PROCEDURE — 99232 SBSQ HOSP IP/OBS MODERATE 35: CPT

## 2024-01-15 RX ADMIN — GABAPENTIN 300 MILLIGRAM(S): 400 CAPSULE ORAL at 12:46

## 2024-01-15 RX ADMIN — SACUBITRIL AND VALSARTAN 1 TABLET(S): 24; 26 TABLET, FILM COATED ORAL at 06:22

## 2024-01-15 RX ADMIN — Medication 3 MILLIGRAM(S): at 00:03

## 2024-01-15 RX ADMIN — GABAPENTIN 300 MILLIGRAM(S): 400 CAPSULE ORAL at 21:46

## 2024-01-15 RX ADMIN — GABAPENTIN 300 MILLIGRAM(S): 400 CAPSULE ORAL at 06:23

## 2024-01-15 RX ADMIN — CARVEDILOL PHOSPHATE 12.5 MILLIGRAM(S): 80 CAPSULE, EXTENDED RELEASE ORAL at 17:56

## 2024-01-15 RX ADMIN — ATORVASTATIN CALCIUM 40 MILLIGRAM(S): 80 TABLET, FILM COATED ORAL at 21:46

## 2024-01-15 RX ADMIN — SACUBITRIL AND VALSARTAN 1 TABLET(S): 24; 26 TABLET, FILM COATED ORAL at 17:56

## 2024-01-15 RX ADMIN — TAMSULOSIN HYDROCHLORIDE 0.8 MILLIGRAM(S): 0.4 CAPSULE ORAL at 21:46

## 2024-01-15 NOTE — PROGRESS NOTE ADULT - SUBJECTIVE AND OBJECTIVE BOX
Urinary tract infection    HPI:  77 y/o M with a PMH of HTN, kidney cancer, pericardial effusion, BPH, NICM, colon CA s/p resection and chemotherapy approximately 3 years ago, CHF with a LVEF of 25 presents with hematuria. Initially presented to the ED on 1/5 with flu like symptoms and reported to have hematuria that resolved upon presentation. CT scan from visit showed a renal lesion. States has had several episodes of intermittent hematuria with clots today and yesterday.  Also reports increase strain during urination. Endorses increasing fatigue and myalgias over the last few days.  Denies fever, chills, back pain, chest pain, SOB, recent travel or sick contact.    In ED, received 1x ceftriaxone.  (12 Jan 2024 04:59)    Interval History:  Patient was seen and examined at bedside around 11 am.  Doing well.   No active complaints.   Voiding through urethra without any difficulty.   Minimal output from supra-pubic catheter.   Denies abdominal pain, nausea or vomiting.   Denies chest pain, palpitations or shortness of breath.    ROS:  As per interval history otherwise unremarkable.    PHYSICAL EXAM:  Vital Signs   T(C): 36.8 (15 Kulwinder 2024 07:37), Max: 36.9 (15 Kulwinder 2024 00:06)  T(F): 98.2 (15 Kulwinder 2024 07:37), Max: 98.5 (15 Kulwinder 2024 00:06)  HR: 58 (15 Kulwinder 2024 07:37) (56 - 68)  BP: 118/62 (15 Kulwinder 2024 07:37) (118/62 - 162/68)  BP(mean): 97 (14 Jan 2024 17:55) (97 - 97)  RR: 18 (15 Kulwinder 2024 07:37) (18 - 18)  SpO2: 97% (15 Kulwinder 2024 07:37) (95% - 98%)  Parameters below as of 15 Kluwinder 2024 07:37  Patient On (Oxygen Delivery Method): room air  General: Elderly male lying in bed comfortably. No acute distress  HEENT: EOMI. Clear conjunctivae. Moist mucus membrane  Neck: Supple.   Chest: Good air entry. No wheezing, rales or rhonchi.   Heart: Normal S1 & S2. RRR.   Abdomen: Non distended. Soft. Non-tender. + BS. Suprapubic catheter in place.   Ext: No pedal edema. No calf tenderness   Neuro: Awake and alert. No focal deficit. Speech clear.   Skin: Warm and Dry  Psychiatry: Normal mood and affect    I&O's Summary    14 Jan 2024 07:01  -  15 Kulwinder 2024 07:00  --------------------------------------------------------  IN: 480 mL / OUT: 200 mL / NET: 280 mL    15 Kulwinder 2024 07:01  -  15 Kulwinder 2024 16:22  --------------------------------------------------------  IN: 0 mL / OUT: 100 mL / NET: -100 mL    LABS:                        11.8   6.76  )-----------( 309      ( 15 Kulwinder 2024 06:38 )             35.7     01-15    139  |  106  |  21.8<H>  ----------------------------<  99  4.2   |  21.0<L>  |  1.10    Ca    7.8<L>      15 Kulwinder 2024 06:38  Mg     2.2     01-15    Blood Culture: 01-12 @ 08:14  Organism --  Gram Stain Blood -- Gram Stain --  Specimen Source .Blood Blood  Culture-Blood --    01-12 @ 08:10  Organism --  Gram Stain Blood -- Gram Stain --  Specimen Source .Blood Blood  Culture-Blood --    01-12 @ 00:43  Organism Morganella morganii  Gram Stain Blood -- Gram Stain --  Specimen Source Clean Catch Clean Catch (Midstream)  Culture-Blood --    RADIOLOGY & ADDITIONAL STUDIES:  Reviewed     MEDICATIONS  (STANDING):  atorvastatin 40 milliGRAM(s) Oral at bedtime  carvedilol 12.5 milliGRAM(s) Oral every 12 hours  gabapentin 300 milliGRAM(s) Oral three times a day  influenza  Vaccine (HIGH DOSE) 0.7 milliLiter(s) IntraMuscular once  sacubitril 49 mG/valsartan 51 mG 1 Tablet(s) Oral two times a day  tamsulosin 0.8 milliGRAM(s) Oral at bedtime    MEDICATIONS  (PRN):  acetaminophen     Tablet .. 650 milliGRAM(s) Oral every 6 hours PRN Temp greater or equal to 38C (100.4F), Mild Pain (1 - 3)  aluminum hydroxide/magnesium hydroxide/simethicone Suspension 30 milliLiter(s) Oral every 4 hours PRN Dyspepsia  melatonin 3 milliGRAM(s) Oral at bedtime PRN Insomnia  ondansetron Injectable 4 milliGRAM(s) IV Push every 8 hours PRN Nausea and/or Vomiting       Urinary tract infection    HPI:  77 y/o M with a PMH of HTN, kidney cancer, pericardial effusion, BPH, NICM, colon CA s/p resection and chemotherapy approximately 3 years ago, CHF with a LVEF of 25 presents with hematuria. Initially presented to the ED on 1/5 with flu like symptoms and reported to have hematuria that resolved upon presentation. CT scan from visit showed a renal lesion. States has had several episodes of intermittent hematuria with clots today and yesterday.  Also reports increase strain during urination. Endorses increasing fatigue and myalgias over the last few days.  Denies fever, chills, back pain, chest pain, SOB, recent travel or sick contact.    In ED, received 1x ceftriaxone.  (12 Jan 2024 04:59)    Interval History:  Patient was seen and examined at bedside around 11 am.  Doing well.   No active complaints.   Voiding through urethra without any difficulty.   Minimal output from supra-pubic catheter.   Denies abdominal pain, nausea or vomiting.   Denies chest pain, palpitations or shortness of breath.    ROS:  As per interval history otherwise unremarkable.    PHYSICAL EXAM:  Vital Signs   T(C): 36.8 (15 Kulwinder 2024 07:37), Max: 36.9 (15 Kulwinder 2024 00:06)  T(F): 98.2 (15 Kulwinder 2024 07:37), Max: 98.5 (15 Kulwinder 2024 00:06)  HR: 58 (15 Kulwinder 2024 07:37) (56 - 68)  BP: 118/62 (15 Kulwinder 2024 07:37) (118/62 - 162/68)  BP(mean): 97 (14 Jan 2024 17:55) (97 - 97)  RR: 18 (15 Kulwinder 2024 07:37) (18 - 18)  SpO2: 97% (15 Kulwinder 2024 07:37) (95% - 98%)  Parameters below as of 15 Kulwinder 2024 07:37  Patient On (Oxygen Delivery Method): room air  General: Elderly male lying in bed comfortably. No acute distress  HEENT: EOMI. Clear conjunctivae. Moist mucus membrane  Neck: Supple.   Chest: Good air entry. No wheezing, rales or rhonchi.   Heart: Normal S1 & S2. RRR.   Abdomen: Non distended. Soft. Non-tender. + BS. Suprapubic catheter in place.   Ext: No pedal edema. No calf tenderness   Neuro: Awake and alert. No focal deficit. Speech clear.   Skin: Warm and Dry  Psychiatry: Normal mood and affect    I&O's Summary    14 Jan 2024 07:01  -  15 Kulwinder 2024 07:00  --------------------------------------------------------  IN: 480 mL / OUT: 200 mL / NET: 280 mL    15 Kulwinder 2024 07:01  -  15 Kulwinder 2024 16:22  --------------------------------------------------------  IN: 0 mL / OUT: 100 mL / NET: -100 mL    LABS:                        11.8   6.76  )-----------( 309      ( 15 Kulwinder 2024 06:38 )             35.7     01-15    139  |  106  |  21.8<H>  ----------------------------<  99  4.2   |  21.0<L>  |  1.10    Ca    7.8<L>      15 Kulwinder 2024 06:38  Mg     2.2     01-15    Blood Culture: 01-12 @ 08:14  Organism --  Gram Stain Blood -- Gram Stain --  Specimen Source .Blood Blood  Culture-Blood --    01-12 @ 08:10  Organism --  Gram Stain Blood -- Gram Stain --  Specimen Source .Blood Blood  Culture-Blood --    01-12 @ 00:43  Organism Morganella morganii  Gram Stain Blood -- Gram Stain --  Specimen Source Clean Catch Clean Catch (Midstream)  Culture-Blood --    RADIOLOGY & ADDITIONAL STUDIES:  Reviewed     MEDICATIONS  (STANDING):  atorvastatin 40 milliGRAM(s) Oral at bedtime  carvedilol 12.5 milliGRAM(s) Oral every 12 hours  gabapentin 300 milliGRAM(s) Oral three times a day  influenza  Vaccine (HIGH DOSE) 0.7 milliLiter(s) IntraMuscular once  sacubitril 49 mG/valsartan 51 mG 1 Tablet(s) Oral two times a day  tamsulosin 0.8 milliGRAM(s) Oral at bedtime    MEDICATIONS  (PRN):  acetaminophen     Tablet .. 650 milliGRAM(s) Oral every 6 hours PRN Temp greater or equal to 38C (100.4F), Mild Pain (1 - 3)  aluminum hydroxide/magnesium hydroxide/simethicone Suspension 30 milliLiter(s) Oral every 4 hours PRN Dyspepsia  melatonin 3 milliGRAM(s) Oral at bedtime PRN Insomnia  ondansetron Injectable 4 milliGRAM(s) IV Push every 8 hours PRN Nausea and/or Vomiting

## 2024-01-15 NOTE — PROGRESS NOTE ADULT - ASSESSMENT
77 y/o M with a PMH of Kidney Cancer, BPH, Pericardial Effusion, NICM, HTN, colon CA s/p resection and chemotherapy approximately 3 years ago presented with hematuria.    #Obstructive uropathy  - POC US Bladder highlighted mixed echogenicity at inferior aspect, concerning for blood products  - Urology/ED unable to place baum at bedside  - s/p cystoscopy with suprapubic catheter placement on 1/12/24  - Bladder US  - Urology following  - Complete Rocephin for suspected UTI  - Continue Flomax   - UCx with Morganella + Enterococcus [id number] CFU/ml  -Blood cultures negative so far    #LINDEN   -likely 2/2 to Obstructive uropathy  -Avoid nephrotoxic medications  -Monitor   -Improving     #Kidney Cancer  - previous CT 1/5 shows renal lesion  - outpatient Urology follow up    #Acute Blood Loss Anemia  -Likely due to hematuria  -Monitor H&H  -Transfuse for Hb < 7    #Abnormal EKG  -Chronic TWI   -Asymptomatic   -Cardio consult appreciated     #HTN  #NICM  - Strict I/O  - c/w atorvastatin   - c/w coreg  - c/w entresto (monitor renal function)   - c/w trend Electrolytes, K>4, Mg>2, Phos>3  - Lasix, Spironolactone and Jardiance on hold for now    - ECHO noted. LV function improving on current meds.      #Colon cancer s/p resection and chemotherapy  - f/u outpatient    #Hypocalcemia  - Repleted    DVT Prophylaxis -- Venodyne    Dispo: Home likely tomorrow.      77 y/o M with a PMH of Kidney Cancer, BPH, Pericardial Effusion, NICM, HTN, colon CA s/p resection and chemotherapy approximately 3 years ago presented with hematuria.    #Obstructive uropathy  - POC US Bladder highlighted mixed echogenicity at inferior aspect, concerning for blood products  - Urology/ED unable to place baum at bedside  - s/p cystoscopy with suprapubic catheter placement on 1/12/24  - Bladder US  - Urology following  - Complete Rocephin for suspected UTI  - Continue Flomax   - UCx with Morganella + Enterococcus [id number] CFU/ml  -Blood cultures negative so far    #LINDEN   -likely 2/2 to Obstructive uropathy  -Avoid nephrotoxic medications  -Monitor   -Improving     #Kidney Cancer  - previous CT 1/5 shows renal lesion  - outpatient Urology follow up    #Acute Blood Loss Anemia  -Likely due to hematuria  -Monitor H&H  -Transfuse for Hb < 7    #Abnormal EKG  -Chronic TWI   -Asymptomatic   -Cardio consult appreciated     #HTN  #NICM  - Strict I/O  - c/w atorvastatin   - c/w coreg  - c/w entresto (monitor renal function)   - c/w trend Electrolytes, K>4, Mg>2, Phos>3  - Lasix, Spironolactone and Jardiance on hold for now    - ECHO noted. LV function improving on current meds.      #Colon cancer s/p resection and chemotherapy  - f/u outpatient    #Hypocalcemia  - Repleted    DVT Prophylaxis -- Venodyne    Dispo: Home likely tomorrow.      79 y/o M with a PMH of Kidney Cancer, BPH, Pericardial Effusion, NICM, HTN, colon CA s/p resection and chemotherapy approximately 3 years ago presented with hematuria.    #Obstructive uropathy  - POC US Bladder highlighted mixed echogenicity at inferior aspect, concerning for blood products  - Urology/ED unable to place baum at bedside  - s/p cystoscopy with suprapubic catheter placement on 1/12/24  - Bladder US  - Urology following  - Complete Rocephin for suspected UTI  - Continue Flomax   - UCx with Morganella + Enterococcus [id number] CFU/ml  -Blood cultures negative so far    #LINDEN   -likely 2/2 to Obstructive uropathy  -Avoid nephrotoxic medications  -Monitor   -Improving     #Kidney Cancer  - previous CT 1/5 shows renal lesion  - outpatient Urology follow up    #Acute Blood Loss Anemia  -Likely due to hematuria  -Monitor H&H  -Transfuse for Hb < 7    #Abnormal EKG  -Chronic TWI   -Asymptomatic   -Cardio consult appreciated     #HTN  #NICM  - Strict I/O  - c/w atorvastatin   - c/w coreg  - c/w entresto (monitor renal function)   - c/w trend Electrolytes, K>4, Mg>2, Phos>3  - Lasix, Spironolactone and Jardiance on hold for now    - ECHO noted. LV function improving on current meds.      #Colon cancer s/p resection and chemotherapy  - f/u outpatient    #Hypocalcemia  - Repleted    DVT Prophylaxis -- Venodyne    Dispo: Home likely tomorrow.

## 2024-01-15 NOTE — PROGRESS NOTE ADULT - SUBJECTIVE AND OBJECTIVE BOX
Urology f/u:    POD# 3 s/p placement SPT.     Patient seen and examined this am, sitting comfortable in chair in no acute distress.  Patient speaks Ethiopian.  Patient states has been voiding yesterday and this morning without difficulty.  Noted urine in urinal this am yellow.  SPT to bedside bag.     Vital Signs Last 24 Hrs  T(C): 36.8 (15 Kulwinder 2024 07:37), Max: 36.9 (15 Kulwinder 2024 00:06)  T(F): 98.2 (15 Kulwinder 2024 07:37), Max: 98.5 (15 Kulwinder 2024 00:06)  HR: 58 (15 Kulwinder 2024 07:37) (56 - 68)  BP: 118/62 (15 Kulwinder 2024 07:37) (118/62 - 162/68)  BP(mean): 97 (14 Jan 2024 17:55) (97 - 97)  RR: 18 (15 Kulwinder 2024 07:37) (18 - 18)  SpO2: 97% (15 Kulwinder 2024 07:37) (95% - 98%)    Parameters below as of 15 Kulwinder 2024 07:37  Patient On (Oxygen Delivery Method): room air    Abdomen: soft n/d, n/t no SPT in place to bedside drainage.  Dressing dry and intact.  denies any pain .  minimal discomfort.  Noted scant drainage in bag states very little has been coming out of SPT.  : voiding- has been voiding well denies discomfort when voids, urine yellow ( noted in urinal this am ) also OOB to bathroom and voids )   OOB and ambulating.     Labs:  CBC:                        11.8   6.76  )-----------( 309      ( 15 Kulwinder 2024 06:38 )             35.7     01-15    139  |  106  |  21.8<H>  ----------------------------<  99  4.2   |  21.0<L>  |  1.10  Impression:  Chronic bladder outlet obstruction ( enlarged prostate ) voiding now, urine yellow.   SPT to BSD.  discuss with Surgeon present situation and continued care and management  Plan:  Continue present care and management, possible clamp off SPT this am and follow voiding.                       Urology f/u:    POD# 3 s/p placement SPT.     Patient seen and examined this am, sitting comfortable in chair in no acute distress.  Patient speaks Canadian.  Patient states has been voiding yesterday and this morning without difficulty.  Noted urine in urinal this am yellow.  SPT to bedside bag.     Vital Signs Last 24 Hrs  T(C): 36.8 (15 Kulwinder 2024 07:37), Max: 36.9 (15 Kulwinder 2024 00:06)  T(F): 98.2 (15 Kulwinder 2024 07:37), Max: 98.5 (15 Kulwinder 2024 00:06)  HR: 58 (15 Kulwinder 2024 07:37) (56 - 68)  BP: 118/62 (15 Kulwinder 2024 07:37) (118/62 - 162/68)  BP(mean): 97 (14 Jan 2024 17:55) (97 - 97)  RR: 18 (15 Kulwinder 2024 07:37) (18 - 18)  SpO2: 97% (15 Kulwinder 2024 07:37) (95% - 98%)    Parameters below as of 15 Kulwinder 2024 07:37  Patient On (Oxygen Delivery Method): room air    Abdomen: soft n/d, n/t no SPT in place to bedside drainage.  Dressing dry and intact.  denies any pain .  minimal discomfort.  Noted scant drainage in bag states very little has been coming out of SPT.  : voiding- has been voiding well denies discomfort when voids, urine yellow ( noted in urinal this am ) also OOB to bathroom and voids )   OOB and ambulating.     Labs:  CBC:                        11.8   6.76  )-----------( 309      ( 15 Kulwinder 2024 06:38 )             35.7     01-15    139  |  106  |  21.8<H>  ----------------------------<  99  4.2   |  21.0<L>  |  1.10  Impression:  Chronic bladder outlet obstruction ( enlarged prostate ) voiding now, urine yellow.   SPT to BSD.  discuss with Surgeon present situation and continued care and management  Plan:  Continue present care and management, possible clamp off SPT this am and follow voiding.                       Urology f/u:    POD# 3 s/p placement SPT.     Patient seen and examined this am, sitting comfortable in chair in no acute distress.  Patient speaks Maltese.  Patient states has been voiding yesterday and this morning without difficulty.  Noted urine in urinal this am yellow.  SPT to bedside bag.     Vital Signs Last 24 Hrs  T(C): 36.8 (15 Kulwinder 2024 07:37), Max: 36.9 (15 Kulwinder 2024 00:06)  T(F): 98.2 (15 Kulwinder 2024 07:37), Max: 98.5 (15 Kulwinder 2024 00:06)  HR: 58 (15 Kulwinder 2024 07:37) (56 - 68)  BP: 118/62 (15 Kulwinder 2024 07:37) (118/62 - 162/68)  BP(mean): 97 (14 Jan 2024 17:55) (97 - 97)  RR: 18 (15 Kulwinder 2024 07:37) (18 - 18)  SpO2: 97% (15 Kulwinder 2024 07:37) (95% - 98%)    Parameters below as of 15 Kulwinder 2024 07:37  Patient On (Oxygen Delivery Method): room air    Abdomen: soft n/d, n/t no SPT in place to bedside drainage.  Dressing dry and intact.  denies any pain .  minimal discomfort.  Noted scant drainage in bag states very little has been coming out of SPT.  : voiding- has been voiding well denies discomfort when voids, urine yellow ( noted in urinal this am ) also OOB to bathroom and voids )   OOB and ambulating.     Labs:  CBC:                        11.8   6.76  )-----------( 309      ( 15 Kulwinder 2024 06:38 )             35.7     01-15    139  |  106  |  21.8<H>  ----------------------------<  99  4.2   |  21.0<L>  |  1.10  Impression:  Chronic bladder outlet obstruction ( enlarged prostate ) voiding now, urine yellow.   SPT to BSD.  discuss with Surgeon present situation and continued care and management  Plan:  Continue present care and management, possible clamp off SPT this am and follow voiding.

## 2024-01-15 NOTE — PROGRESS NOTE ADULT - NS ATTEND AMEND GEN_ALL_CORE FT
Agree with above  s/p tube irrigated  observe output
voiding per urethra, urine clear  s/p tube wiyh poor drainage despite irrigation/repositioning  will get f/u ultrasound to check position and consider capping s/p tube.
As above  s/p tube adjusted/irrigated  follow output

## 2024-01-16 ENCOUNTER — TRANSCRIPTION ENCOUNTER (OUTPATIENT)
Age: 79
End: 2024-01-16

## 2024-01-16 VITALS
RESPIRATION RATE: 18 BRPM | HEART RATE: 53 BPM | DIASTOLIC BLOOD PRESSURE: 63 MMHG | OXYGEN SATURATION: 96 % | TEMPERATURE: 98 F | SYSTOLIC BLOOD PRESSURE: 148 MMHG

## 2024-01-16 LAB
ANION GAP SERPL CALC-SCNC: 10 MMOL/L — SIGNIFICANT CHANGE UP (ref 5–17)
BUN SERPL-MCNC: 22 MG/DL — HIGH (ref 8–20)
CA-I BLD-SCNC: 1.15 MMOL/L — SIGNIFICANT CHANGE UP (ref 1.15–1.33)
CALCIUM SERPL-MCNC: 8 MG/DL — LOW (ref 8.4–10.5)
CHLORIDE SERPL-SCNC: 111 MMOL/L — HIGH (ref 96–108)
CO2 SERPL-SCNC: 23 MMOL/L — SIGNIFICANT CHANGE UP (ref 22–29)
CREAT SERPL-MCNC: 1.03 MG/DL — SIGNIFICANT CHANGE UP (ref 0.5–1.3)
EGFR: 74 ML/MIN/1.73M2 — SIGNIFICANT CHANGE UP
GLUCOSE SERPL-MCNC: 91 MG/DL — SIGNIFICANT CHANGE UP (ref 70–99)
HCT VFR BLD CALC: 34.9 % — LOW (ref 39–50)
HGB BLD-MCNC: 11.5 G/DL — LOW (ref 13–17)
MCHC RBC-ENTMCNC: 30.3 PG — SIGNIFICANT CHANGE UP (ref 27–34)
MCHC RBC-ENTMCNC: 33 GM/DL — SIGNIFICANT CHANGE UP (ref 32–36)
MCV RBC AUTO: 92.1 FL — SIGNIFICANT CHANGE UP (ref 80–100)
PLATELET # BLD AUTO: 338 K/UL — SIGNIFICANT CHANGE UP (ref 150–400)
POTASSIUM SERPL-MCNC: 4.6 MMOL/L — SIGNIFICANT CHANGE UP (ref 3.5–5.3)
POTASSIUM SERPL-SCNC: 4.6 MMOL/L — SIGNIFICANT CHANGE UP (ref 3.5–5.3)
RBC # BLD: 3.79 M/UL — LOW (ref 4.2–5.8)
RBC # FLD: 12.9 % — SIGNIFICANT CHANGE UP (ref 10.3–14.5)
SODIUM SERPL-SCNC: 144 MMOL/L — SIGNIFICANT CHANGE UP (ref 135–145)
WBC # BLD: 7.87 K/UL — SIGNIFICANT CHANGE UP (ref 3.8–10.5)
WBC # FLD AUTO: 7.87 K/UL — SIGNIFICANT CHANGE UP (ref 3.8–10.5)

## 2024-01-16 PROCEDURE — 83735 ASSAY OF MAGNESIUM: CPT

## 2024-01-16 PROCEDURE — 85018 HEMOGLOBIN: CPT

## 2024-01-16 PROCEDURE — 97163 PT EVAL HIGH COMPLEX 45 MIN: CPT

## 2024-01-16 PROCEDURE — 96374 THER/PROPH/DIAG INJ IV PUSH: CPT

## 2024-01-16 PROCEDURE — 87040 BLOOD CULTURE FOR BACTERIA: CPT

## 2024-01-16 PROCEDURE — 85610 PROTHROMBIN TIME: CPT

## 2024-01-16 PROCEDURE — 76857 US EXAM PELVIC LIMITED: CPT

## 2024-01-16 PROCEDURE — 71045 X-RAY EXAM CHEST 1 VIEW: CPT

## 2024-01-16 PROCEDURE — 87086 URINE CULTURE/COLONY COUNT: CPT

## 2024-01-16 PROCEDURE — 85730 THROMBOPLASTIN TIME PARTIAL: CPT

## 2024-01-16 PROCEDURE — 36415 COLL VENOUS BLD VENIPUNCTURE: CPT

## 2024-01-16 PROCEDURE — 99285 EMERGENCY DEPT VISIT HI MDM: CPT

## 2024-01-16 PROCEDURE — 81001 URINALYSIS AUTO W/SCOPE: CPT

## 2024-01-16 PROCEDURE — C1769: CPT

## 2024-01-16 PROCEDURE — 86850 RBC ANTIBODY SCREEN: CPT

## 2024-01-16 PROCEDURE — 80053 COMPREHEN METABOLIC PANEL: CPT

## 2024-01-16 PROCEDURE — C1894: CPT

## 2024-01-16 PROCEDURE — 93005 ELECTROCARDIOGRAM TRACING: CPT

## 2024-01-16 PROCEDURE — 84484 ASSAY OF TROPONIN QUANT: CPT

## 2024-01-16 PROCEDURE — 87077 CULTURE AEROBIC IDENTIFY: CPT

## 2024-01-16 PROCEDURE — 82330 ASSAY OF CALCIUM: CPT

## 2024-01-16 PROCEDURE — 99239 HOSP IP/OBS DSCHRG MGMT >30: CPT

## 2024-01-16 PROCEDURE — 83880 ASSAY OF NATRIURETIC PEPTIDE: CPT

## 2024-01-16 PROCEDURE — 85014 HEMATOCRIT: CPT

## 2024-01-16 PROCEDURE — 87186 SC STD MICRODIL/AGAR DIL: CPT

## 2024-01-16 PROCEDURE — T1013: CPT

## 2024-01-16 PROCEDURE — 80048 BASIC METABOLIC PNL TOTAL CA: CPT

## 2024-01-16 PROCEDURE — 86900 BLOOD TYPING SEROLOGIC ABO: CPT

## 2024-01-16 PROCEDURE — 84100 ASSAY OF PHOSPHORUS: CPT

## 2024-01-16 PROCEDURE — 86901 BLOOD TYPING SEROLOGIC RH(D): CPT

## 2024-01-16 PROCEDURE — 85025 COMPLETE CBC W/AUTO DIFF WBC: CPT

## 2024-01-16 PROCEDURE — 85027 COMPLETE CBC AUTOMATED: CPT

## 2024-01-16 PROCEDURE — 83036 HEMOGLOBIN GLYCOSYLATED A1C: CPT

## 2024-01-16 PROCEDURE — C1758: CPT

## 2024-01-16 RX ORDER — GABAPENTIN 400 MG/1
1 CAPSULE ORAL
Qty: 0 | Refills: 0 | DISCHARGE

## 2024-01-16 RX ORDER — CARVEDILOL PHOSPHATE 80 MG/1
1 CAPSULE, EXTENDED RELEASE ORAL
Qty: 180 | Refills: 0
Start: 2024-01-16 | End: 2024-04-14

## 2024-01-16 RX ORDER — EMPAGLIFLOZIN 10 MG/1
1 TABLET, FILM COATED ORAL
Qty: 90 | Refills: 0
Start: 2024-01-16 | End: 2024-04-14

## 2024-01-16 RX ORDER — SACUBITRIL AND VALSARTAN 24; 26 MG/1; MG/1
1 TABLET, FILM COATED ORAL
Qty: 180 | Refills: 0
Start: 2024-01-16 | End: 2024-04-14

## 2024-01-16 RX ORDER — TAMSULOSIN HYDROCHLORIDE 0.4 MG/1
2 CAPSULE ORAL
Qty: 60 | Refills: 0
Start: 2024-01-16 | End: 2024-02-14

## 2024-01-16 RX ORDER — SPIRONOLACTONE 25 MG/1
1 TABLET, FILM COATED ORAL
Qty: 90 | Refills: 0
Start: 2024-01-16 | End: 2024-04-14

## 2024-01-16 RX ORDER — ATORVASTATIN CALCIUM 80 MG/1
1 TABLET, FILM COATED ORAL
Qty: 90 | Refills: 0
Start: 2024-01-16 | End: 2024-04-14

## 2024-01-16 RX ORDER — CARVEDILOL PHOSPHATE 80 MG/1
1 CAPSULE, EXTENDED RELEASE ORAL
Refills: 0 | DISCHARGE

## 2024-01-16 RX ADMIN — SACUBITRIL AND VALSARTAN 1 TABLET(S): 24; 26 TABLET, FILM COATED ORAL at 05:14

## 2024-01-16 RX ADMIN — GABAPENTIN 300 MILLIGRAM(S): 400 CAPSULE ORAL at 13:38

## 2024-01-16 RX ADMIN — GABAPENTIN 300 MILLIGRAM(S): 400 CAPSULE ORAL at 05:14

## 2024-01-16 NOTE — PHYSICAL THERAPY INITIAL EVALUATION ADULT - PERTINENT HX OF CURRENT PROBLEM, REHAB EVAL
79 y/o M with a PMH of HTN, kidney cancer, pericardial effusion, BPH, NICM, colon CA s/p resection and chemotherapy approximately 3 years ago, CHF with a LVEF of 25 presents with hematuria. Initially presented to the ED on 1/5 with flu like symptoms and reported to have hematuria that resolved upon presentation. CT scan from visit showed a renal lesion. States has had several episodes of intermittent hematuria with clots today and yesterday.  Also reports increase strain during urination. Endorses increasing fatigue and myalgias over the last few days.  Denies fever, chills, back pain, chest pain, SOB, recent travel or sick contact. 77 y/o M with a PMH of HTN, kidney cancer, pericardial effusion, BPH, NICM, colon CA s/p resection and chemotherapy approximately 3 years ago, CHF with a LVEF of 25 presents with hematuria. Initially presented to the ED on 1/5 with flu like symptoms and reported to have hematuria that resolved upon presentation. CT scan from visit showed a renal lesion. States has had several episodes of intermittent hematuria with clots today and yesterday.  Also reports increase strain during urination. Endorses increasing fatigue and myalgias over the last few days.  Denies fever, chills, back pain, chest pain, SOB, recent travel or sick contact.

## 2024-01-16 NOTE — PROGRESS NOTE ADULT - ASSESSMENT
77 yo male with gross hematuria, POD#4 attempted cystoscopy, found to have false passages, unable to enter bladder from urethra, placement of SPT, UTI  - SPT not in place on US  - SPT removed, dressing applied  - pt voiding yellow urine, hematuria resolved  - LINDEN resolved  - d/c planning as per primary team, no further  intervention required  - pt may f/u with his urologist in [place] when he returns  - antibiotics completed 79 yo male with gross hematuria, POD#4 attempted cystoscopy, found to have false passages, unable to enter bladder from urethra, placement of SPT, UTI  - SPT not in place on US  - SPT removed, dressing applied  - pt voiding yellow urine, hematuria resolved  - LINDEN resolved  - d/c planning as per primary team, no further  intervention required  - pt may f/u with his urologist in [place] when he returns  - antibiotics completed

## 2024-01-16 NOTE — PROGRESS NOTE ADULT - REASON FOR ADMISSION
Urinary Tract Infection with Hematuria

## 2024-01-16 NOTE — DISCHARGE NOTE PROVIDER - NSDCCPCAREPLAN_GEN_ALL_CORE_FT
PRINCIPAL DISCHARGE DIAGNOSIS  Diagnosis: Obstructive uropathy  Assessment and Plan of Treatment: s/p cystoscopy with placement of suprapubic catheter and subsequent removal.  Continue medications as prescribed.  Follow up with Urology.

## 2024-01-16 NOTE — PHYSICAL THERAPY INITIAL EVALUATION ADULT - ADDITIONAL COMMENTS
Pt states he lives with his daughter in a 1st floor apartment with no steps to enter and none inside. no DME.

## 2024-01-16 NOTE — DISCHARGE NOTE PROVIDER - HOSPITAL COURSE
79 y/o M with a PMH of Kidney Cancer, BPH, Pericardial Effusion, NICM, HTN, colon CA s/p resection and chemotherapy approximately 3 years ago presented with hematuria.  Admitted with Obstructive Uropathy. POC US Bladder highlighted mixed echogenicity at inferior aspect, concerning for blood products. ED / Urology unable to place baum at bedside.  Urology took him to OR on 1/12/24 for cystoscopy.   Operative findings:  rigid cystoscopy- false passages in urethra, blood and clot filled urethra, unable to visualize bladder, unable to pass wire into bladder, complex suprapubic tube insertion, placement of 20Fr [place] tip catheter over wire.   Postprocedure, he was monitored closely. LINDEN / Hematuria resolved. He finished 5 days of Rocephin for suspected UTI (urine culture with Morganella + Enterococcus [id number] CFU/ml). He is not voiding through urethra. Urology removed suprapubic catheter on 1/16/24. He is feeling better and is stable for discharge.    77 y/o M with a PMH of Kidney Cancer, BPH, Pericardial Effusion, NICM, HTN, colon CA s/p resection and chemotherapy approximately 3 years ago presented with hematuria.  Admitted with Obstructive Uropathy. POC US Bladder highlighted mixed echogenicity at inferior aspect, concerning for blood products. ED / Urology unable to place baum at bedside.  Urology took him to OR on 1/12/24 for cystoscopy.   Operative findings:  rigid cystoscopy- false passages in urethra, blood and clot filled urethra, unable to visualize bladder, unable to pass wire into bladder, complex suprapubic tube insertion, placement of 20Fr [place] tip catheter over wire.   Postprocedure, he was monitored closely. LINDEN / Hematuria resolved. He finished 5 days of Rocephin for suspected UTI (urine culture with Morganella + Enterococcus [id number] CFU/ml). He is not voiding through urethra. Urology removed suprapubic catheter on 1/16/24. He is feeling better and is stable for discharge.

## 2024-01-16 NOTE — PROGRESS NOTE ADULT - SUBJECTIVE AND OBJECTIVE BOX
Subjective:78yMale with gross hematuria, urinary retention on admission, UTI.  POD#4 s/p attempted cystoscopy, insertion of SPT.  Pt with multiple false passages seen on cysto, SPT not draining well, not secured in place with stat lock.  US performed yesterday, SPT no in bladder.  pt has been voiding in small increments, yellow urine, no clots, no pain.        Vital Signs Last 24 Hrs  T(C): 36.9 (16 Jan 2024 07:23), Max: 37.1 (16 Jan 2024 00:00)  T(F): 98.4 (16 Jan 2024 07:23), Max: 98.7 (16 Jan 2024 00:00)  HR: 54 (16 Jan 2024 07:23) (50 - 64)  BP: 114/56 (16 Jan 2024 07:23) (114/56 - 158/64)  BP(mean): --  RR: 18 (16 Jan 2024 07:23) (18 - 18)  SpO2: 96% (16 Jan 2024 07:23) (95% - 98%)    Parameters below as of 16 Jan 2024 07:23  Patient On (Oxygen Delivery Method): room air      I&O's Detail    15 Kulwinder 2024 07:01  -  16 Jan 2024 07:00  --------------------------------------------------------  IN:  Total IN: 0 mL    OUT:    Indwelling Catheter - Suprapubic (mL): 0 mL    Voided (mL): 500 mL  Total OUT: 500 mL    Total NET: -500 mL          Labs:                        11.5   7.87  )-----------( 338      ( 16 Jan 2024 04:41 )             34.9     01-16    144  |  111<H>  |  22.0<H>  ----------------------------<  91  4.6   |  23.0  |  1.03    Ca    8.0<L>      16 Jan 2024 04:41  Mg     2.2     01-15

## 2024-01-16 NOTE — DISCHARGE NOTE PROVIDER - CARE PROVIDER_API CALL
Steven Oglesby  Urology  [address] [place] [id number]  Phone: [phone]  Fax: [phone]  Established Patient  Follow Up Time: 1 week    Layton Avilez  Interventional Cardiology  [address] [place] [address], [address]  [place] [address]  Phone: [phone]  Fax: [phone]  Scheduled Appointment: 03/21/2024    PMD,   Phone: (   )    -  Fax: (   )    -  Established Patient  Follow Up Time: 1 week   Steven Oglesby  Urology  [address] [place] [id number]  Phone: [phone]  Fax: [phone]  Established Patient  Follow Up Time: 1 week    Layton Avilez  Interventional Cardiology  [address] [place] [address], [address]  [place] [address]  Phone: [phone]  Fax: [phone]  Scheduled Appointment: 03/21/2024    PMD,   Phone: (   )    -  Fax: (   )    -  Established Patient  Follow Up Time: 1 week

## 2024-01-16 NOTE — DISCHARGE NOTE PROVIDER - NSDCMRMEDTOKEN_GEN_ALL_CORE_FT
albuterol 90 mcg/inh inhalation aerosol: 2 puff(s) inhaled every 6 hours   aspirin 81 mg oral delayed release tablet: 1 tab(s) orally once a day  atorvastatin 40 mg oral tablet: 1 tab(s) orally once a day (at bedtime)  carvedilol 12.5 mg oral tablet: 1 tab(s) orally 2 times a day  Entresto 49 mg-51 mg oral tablet: 1 tab(s) orally 2 times a day  furosemide 40 mg oral tablet: 1 tab(s) orally once a day Start on 1/19/24  gabapentin 800 mg oral tablet: 1 tab(s) orally 3 times a day  spironolactone 25 mg oral tablet: 1 tab(s) orally once a day  tamsulosin 0.4 mg oral capsule: 2 cap(s) orally once a day (at bedtime)

## 2024-01-16 NOTE — DISCHARGE NOTE PROVIDER - ATTENDING DISCHARGE PHYSICAL EXAMINATION:
Vital Signs  T(C): 36.8 (16 Jan 2024 13:41), Max: 37.1 (16 Jan 2024 00:00)  T(F): 98.3 (16 Jan 2024 13:41), Max: 98.7 (16 Jan 2024 00:00)  HR: 57 (16 Jan 2024 13:41) (50 - 64)  BP: 157/63 (16 Jan 2024 13:41) (114/56 - 158/64)  RR: 18 (16 Jan 2024 13:41) (18 - 18)  SpO2: 98% (16 Jan 2024 13:41) (95% - 98%)  Parameters below as of 16 Jan 2024 13:41  Patient On (Oxygen Delivery Method): room air  General: Elderly male sitting in bed comfortably. No acute distress  HEENT: EOMI. Clear conjunctivae. Moist mucus membrane  Neck: Supple.   Chest: Good air entry. No wheezing, rales or rhonchi.   Heart: Normal S1 & S2. RRR.   Abdomen: Non distended. Soft. Non-tender. + BS. Dressing on suprapubic catheter site.   Ext: No pedal edema. No calf tenderness   Neuro: Awake and alert. No focal deficit. Speech clear.   Skin: Warm and Dry  Psychiatry: Normal mood and affect

## 2024-01-16 NOTE — DISCHARGE NOTE PROVIDER - CARE PROVIDERS DIRECT ADDRESSES
,[email or web address][email or web address],DirectAddress_Unknown ,[email or web address],DirectAddress_Unknown

## 2024-01-16 NOTE — DISCHARGE NOTE PROVIDER - PROVIDER TOKENS
PROVIDER:[TOKEN:[[id number]],FOLLOWUP:[1 week],ESTABLISHEDPATIENT:[T]],PROVIDER:[TOKEN:[[id number]],SCHEDULEDAPPT:[03/21/2024]],FREE:[LAST:[PMD],PHONE:[(   )    -],FAX:[(   )    -],FOLLOWUP:[1 week],ESTABLISHEDPATIENT:[T]] PROVIDER:[TOKEN:[[id number]],FOLLOWUP:[1 week],ESTABLISHEDPATIENT:[T]],PROVIDER:[TOKEN:[[id number]],SCHEDULEDAPPT:[03/21/2024]],FREE:[LAST:[PMD],PHONE:[(   )    -],FAX:[(   )    -],FOLLOWUP:[1 week],ESTABLISHEDPATIENT:[T]]

## 2024-01-16 NOTE — DISCHARGE NOTE NURSING/CASE MANAGEMENT/SOCIAL WORK - PATIENT PORTAL LINK FT
You can access the FollowMyHealth Patient Portal offered by [facility] by registering at the following website: [email or web address]. By joining [facility]’s FollowMyHealth portal, you will also be able to view your health information using other applications (apps) compatible with our system. You can access the FollowMyHealth Patient Portal offered by [facility] by registering at the following website: [email or web address]. By joining [employer]’s FollowMyHealth portal, you will also be able to view your health information using other applications (apps) compatible with our system.

## 2024-01-16 NOTE — DISCHARGE NOTE PROVIDER - NSDCFUSCHEDAPPT_GEN_ALL_CORE_FT
Layton Avilez  [facility] Physician [facility]  CARDIOLOGY [address]  Scheduled Appointment: 03/21/2024     Layton Avilez  [facility] Physician [facility]  CARDIOLOGY [address]  Scheduled Appointment: 03/21/2024

## 2024-01-16 NOTE — DISCHARGE NOTE NURSING/CASE MANAGEMENT/SOCIAL WORK - NSDCPEFALRISK_GEN_ALL_CORE
For information on Fall & Injury Prevention, visit: [email or web address] OR  [email or web address] OR  https://www.cdc.gov/steadi/patient.html For information on Fall & Injury Prevention, visit: [email or web address] OR  [email or web address]-injury OR  https://www.cdc.gov/steadi/patient.html

## 2024-01-17 LAB
CULTURE RESULTS: SIGNIFICANT CHANGE UP
CULTURE RESULTS: SIGNIFICANT CHANGE UP
SPECIMEN SOURCE: SIGNIFICANT CHANGE UP
SPECIMEN SOURCE: SIGNIFICANT CHANGE UP

## 2024-01-19 RX ORDER — FUROSEMIDE 40 MG
1 TABLET ORAL
Qty: 90 | Refills: 0
Start: 2024-01-19 | End: 2024-04-17

## 2024-01-24 ENCOUNTER — APPOINTMENT (OUTPATIENT)
Dept: UROLOGY | Facility: CLINIC | Age: 79
End: 2024-01-24
Payer: MEDICARE

## 2024-01-24 VITALS
RESPIRATION RATE: 16 BRPM | OXYGEN SATURATION: 95 % | TEMPERATURE: 97.5 F | BODY MASS INDEX: 29.4 KG/M2 | WEIGHT: 210 LBS | SYSTOLIC BLOOD PRESSURE: 154 MMHG | DIASTOLIC BLOOD PRESSURE: 57 MMHG | HEART RATE: 62 BPM | HEIGHT: 71 IN

## 2024-01-24 DIAGNOSIS — C64.9 MALIGNANT NEOPLASM OF UNSPECIFIED KIDNEY, EXCEPT RENAL PELVIS: ICD-10-CM

## 2024-01-24 DIAGNOSIS — R31.0 GROSS HEMATURIA: ICD-10-CM

## 2024-01-24 PROCEDURE — 99214 OFFICE O/P EST MOD 30 MIN: CPT

## 2024-01-24 RX ORDER — FINASTERIDE 5 MG/1
5 TABLET, FILM COATED ORAL
Qty: 90 | Refills: 3 | Status: ACTIVE | COMMUNITY
Start: 2024-01-24 | End: 1900-01-01

## 2024-01-24 NOTE — ASSESSMENT
[FreeTextEntry1] : Impression gross hematria BPH--prostate size calculated at approximately 188 CC kidney cancer, possibly recurrent  Plan:  tamsulosin finasteride renal MRI followup 2 weeks.

## 2024-01-24 NOTE — HISTORY OF PRESENT ILLNESS
[FreeTextEntry1] : He has gross hematuria 1-2 weeks ago.  was seen in the ER.  Not kept.  It worsened. and then had to be readmitted and placed on CBI. Now his urine is intermittently bloody.  he had a kidney cancer treated with freezing.

## 2024-01-24 NOTE — PHYSICAL EXAM
[Heart Rate And Rhythm] : heart rate and rhythm were normal [General Appearance - Well Developed] : well developed [] : no respiratory distress [Normal Station and Gait] : the gait and station were normal for the patient's age [Oriented To Time, Place, And Person] : oriented to person, place, and time [Skin Color & Pigmentation] : normal skin color and pigmentation [Affect] : the affect was normal [Not Anxious] : not anxious

## 2024-02-03 ENCOUNTER — APPOINTMENT (OUTPATIENT)
Dept: MRI IMAGING | Facility: CLINIC | Age: 79
End: 2024-02-03

## 2024-02-14 ENCOUNTER — APPOINTMENT (OUTPATIENT)
Dept: UROLOGY | Facility: CLINIC | Age: 79
End: 2024-02-14

## 2024-03-11 NOTE — CONSULT NOTE ADULT - PROBLEM SELECTOR RECOMMENDATION 9
EP catheter inserted at the coronary sinus. NSTEMI   Pt having cough, wheezing and congestion  x 1 month sp flu. Now pt co SOB at rest x 1 week  Pt with Hx of pericardial effusion in 12/21 sp window and pericardiocentesis. In the ED, attending performed bedside  POCUS with not evidence of large pericardial effusion  Trop x 1 elevated (0.14)  EKG   TTE Echo Limited or F/U 12.20.21 shows EF of 50 to 55%. Small pericardial effusion. Small circumferential pericardial effusion with mild MV inflow   variation without wilian sign of chamber collapse (suboptimal imaging). Moderate aortic regurgitation.  Pt with minimal CAD as per [facility] from 12/2021. Continue ASA and statin. Monitor LFTs  Telemonitor   mg x 1  Start full AC with Heparin. Monitor coag panel and CBC  Start Lipitor 40 mg OD. Monitor LFTs  Keep patient NPO after MN x cardiac Cath  O2 NC PRN  Trend CE. Trend trops x 3 q6. Serial EKGs  A1C, lipid panel fasting, TFTs, sed rate to ro comorbidities   Echo to assess structural and functional status  Maintain K+~4 and mag~2  cardiac rehab info provided/referral and communication to cardiac rehab completed NSTEMI   Pt having cough, wheezing and congestion  x 1 month sp flu. Now pt co SOB at rest x 1 week  Pt with Hx of pericardial effusion in 12/21 sp window and pericardiocentesis. In the ED, attending performed bedside  POCUS with not evidence of large pericardial effusion  Trop x 1 elevated (0.14)  EKG shows  NSR with ST abnormalities and new TWI in anterior leads   TTE Echo Limited or F/U 12.20.21 shows EF of 50 to 55%. Small pericardial effusion. Small circumferential pericardial effusion with mild MV inflow   variation without wilian sign of chamber collapse (suboptimal imaging). Moderate aortic regurgitation.  Pt with minimal CAD as per [facility] from 12/2021. Continue ASA and statin. Monitor LFTs  Telemonitor   mg x 1  Start full AC with Heparin. Monitor coag panel and CBC  Start Lipitor 40 mg OD. Monitor LFTs  Keep patient NPO after MN x cardiac Cath  O2 NC PRN  Trend CE. Trend trops x 3 q6. Serial EKGs  A1C, lipid panel fasting, TFTs, sed rate to ro comorbidities   Echo to assess structural and functional status  Maintain K+~4 and mag~2  cardiac rehab info provided/referral and communication to cardiac rehab completed NSTEMI   Pt having cough, wheezing and congestion  x 1 month sp flu. Now pt co SOB at rest x 1 week  Pt with Hx of pericardial effusion in 12/21 sp window and pericardiocentesis. In the ED, attending performed bedside  POCUS with not evidence of large pericardial effusion  Trop x 1 elevated (0.14)  EKG shows  NSR with ST abnormalities and new TWI in anterior leads   TTE Echo Limited or F/U 12.20.21 shows EF of 50 to 55%. Small pericardial effusion. Small circumferential pericardial effusion with mild MV inflow variation without wilian sign of chamber collapse (suboptimal imaging). Moderate aortic regurgitation.  Pt with minimal CAD as per [facility] from 12/2021. Continue ASA and statin. Monitor LFTs  Telemonitor   mg x 1  Start full AC with Heparin. Monitor coag panel and CBC  Start Lipitor 40 mg OD. Monitor LFTs  Keep patient NPO after MN x cardiac Cath  O2 NC PRN  Trend CE. Trend trops x 3 q6. Serial EKGs  A1C, lipid panel fasting, TFTs, sed rate to ro comorbidities   Echo to assess structural and functional status  Maintain K+~4 and mag~2  cardiac rehab info provided/referral and communication to cardiac rehab completed ?NSTEMI   Pt having cough, wheezing and congestion  x 1 month sp flu. Now pt co SOB at rest x 1 week  Pt with Hx of pericardial effusion in 12/21 sp window and pericardiocentesis. In the ED, attending performed bedside  POCUS with not evidence of large pericardial effusion  Trop x 1 elevated (0.14)  EKG shows  NSR with ST abnormalities and new TWI in anterior leads   TTE Echo Limited or F/U 12.20.21 shows EF of 50 to 55%. Small pericardial effusion. Small circumferential pericardial effusion with mild MV inflow variation without wilian sign of chamber collapse (suboptimal imaging). Moderate aortic regurgitation.  Pt with minimal CAD as per [facility] from 12/2021. Continue ASA and statin. Monitor LFTs  Telemonitor   mg x 1  Start full AC with Heparin. Monitor coag panel and CBC  Start Lipitor 40 mg OD. Monitor LFTs  Keep patient NPO after MN x cardiac Cath  O2 NC PRN  Trend CE. Trend trops x 3 q6. Serial EKGs  A1C, lipid panel fasting, TFTs, sed rate to ro comorbidities   Echo to assess structural and functional status  Maintain K+~4 and mag~2  cardiac rehab info provided/referral and communication to cardiac rehab completed

## 2024-03-21 ENCOUNTER — APPOINTMENT (OUTPATIENT)
Dept: CARDIOLOGY | Facility: CLINIC | Age: 79
End: 2024-03-21

## 2024-04-19 ENCOUNTER — RX RENEWAL (OUTPATIENT)
Age: 79
End: 2024-04-19

## 2024-04-19 RX ORDER — TAMSULOSIN HYDROCHLORIDE 0.4 MG/1
0.4 CAPSULE ORAL
Qty: 90 | Refills: 0 | Status: ACTIVE | COMMUNITY
Start: 2024-01-24 | End: 1900-01-01

## 2024-04-23 ENCOUNTER — EMERGENCY (EMERGENCY)
Facility: HOSPITAL | Age: 79
LOS: 1 days | Discharge: OTHER INSTITUTION | End: 2024-04-23
Attending: EMERGENCY MEDICINE
Payer: MEDICARE

## 2024-04-23 VITALS
HEART RATE: 75 BPM | RESPIRATION RATE: 24 BRPM | TEMPERATURE: 98 F | OXYGEN SATURATION: 94 % | WEIGHT: 209.22 LBS | DIASTOLIC BLOOD PRESSURE: 82 MMHG | SYSTOLIC BLOOD PRESSURE: 208 MMHG

## 2024-04-23 VITALS
OXYGEN SATURATION: 94 % | HEART RATE: 84 BPM | SYSTOLIC BLOOD PRESSURE: 186 MMHG | RESPIRATION RATE: 18 BRPM | DIASTOLIC BLOOD PRESSURE: 74 MMHG

## 2024-04-23 DIAGNOSIS — Z98.890 OTHER SPECIFIED POSTPROCEDURAL STATES: Chronic | ICD-10-CM

## 2024-04-23 DIAGNOSIS — Z90.49 ACQUIRED ABSENCE OF OTHER SPECIFIED PARTS OF DIGESTIVE TRACT: Chronic | ICD-10-CM

## 2024-04-23 LAB
ALBUMIN SERPL ELPH-MCNC: 4.4 G/DL — SIGNIFICANT CHANGE UP (ref 3.3–5.2)
ALP SERPL-CCNC: 154 U/L — HIGH (ref 40–120)
ALT FLD-CCNC: 15 U/L — SIGNIFICANT CHANGE UP
ANION GAP SERPL CALC-SCNC: 14 MMOL/L — SIGNIFICANT CHANGE UP (ref 5–17)
AST SERPL-CCNC: 20 U/L — SIGNIFICANT CHANGE UP
BASOPHILS # BLD AUTO: 0.04 K/UL — SIGNIFICANT CHANGE UP (ref 0–0.2)
BASOPHILS NFR BLD AUTO: 0.3 % — SIGNIFICANT CHANGE UP (ref 0–2)
BILIRUB SERPL-MCNC: 1.2 MG/DL — SIGNIFICANT CHANGE UP (ref 0.4–2)
BUN SERPL-MCNC: 23.8 MG/DL — HIGH (ref 8–20)
CALCIUM SERPL-MCNC: 9 MG/DL — SIGNIFICANT CHANGE UP (ref 8.4–10.5)
CHLORIDE SERPL-SCNC: 103 MMOL/L — SIGNIFICANT CHANGE UP (ref 96–108)
CO2 SERPL-SCNC: 24 MMOL/L — SIGNIFICANT CHANGE UP (ref 22–29)
CREAT SERPL-MCNC: 1.17 MG/DL — SIGNIFICANT CHANGE UP (ref 0.5–1.3)
EGFR: 63 ML/MIN/1.73M2 — SIGNIFICANT CHANGE UP
EOSINOPHIL # BLD AUTO: 0.25 K/UL — SIGNIFICANT CHANGE UP (ref 0–0.5)
EOSINOPHIL NFR BLD AUTO: 1.7 % — SIGNIFICANT CHANGE UP (ref 0–6)
FLUAV AG NPH QL: SIGNIFICANT CHANGE UP
FLUBV AG NPH QL: SIGNIFICANT CHANGE UP
GLUCOSE SERPL-MCNC: 117 MG/DL — HIGH (ref 70–99)
HCT VFR BLD CALC: 45.2 % — SIGNIFICANT CHANGE UP (ref 39–50)
HGB BLD-MCNC: 15 G/DL — SIGNIFICANT CHANGE UP (ref 13–17)
IMM GRANULOCYTES NFR BLD AUTO: 0.4 % — SIGNIFICANT CHANGE UP (ref 0–0.9)
LYMPHOCYTES # BLD AUTO: 0.7 K/UL — LOW (ref 1–3.3)
LYMPHOCYTES # BLD AUTO: 4.7 % — LOW (ref 13–44)
MCHC RBC-ENTMCNC: 29.4 PG — SIGNIFICANT CHANGE UP (ref 27–34)
MCHC RBC-ENTMCNC: 33.2 GM/DL — SIGNIFICANT CHANGE UP (ref 32–36)
MCV RBC AUTO: 88.5 FL — SIGNIFICANT CHANGE UP (ref 80–100)
MONOCYTES # BLD AUTO: 0.79 K/UL — SIGNIFICANT CHANGE UP (ref 0–0.9)
MONOCYTES NFR BLD AUTO: 5.3 % — SIGNIFICANT CHANGE UP (ref 2–14)
NEUTROPHILS # BLD AUTO: 12.99 K/UL — HIGH (ref 1.8–7.4)
NEUTROPHILS NFR BLD AUTO: 87.6 % — HIGH (ref 43–77)
PLATELET # BLD AUTO: 189 K/UL — SIGNIFICANT CHANGE UP (ref 150–400)
POTASSIUM SERPL-MCNC: 4.6 MMOL/L — SIGNIFICANT CHANGE UP (ref 3.5–5.3)
POTASSIUM SERPL-SCNC: 4.6 MMOL/L — SIGNIFICANT CHANGE UP (ref 3.5–5.3)
PROT SERPL-MCNC: 8 G/DL — SIGNIFICANT CHANGE UP (ref 6.6–8.7)
RBC # BLD: 5.11 M/UL — SIGNIFICANT CHANGE UP (ref 4.2–5.8)
RBC # FLD: 13.6 % — SIGNIFICANT CHANGE UP (ref 10.3–14.5)
RSV RNA NPH QL NAA+NON-PROBE: SIGNIFICANT CHANGE UP
SARS-COV-2 RNA SPEC QL NAA+PROBE: SIGNIFICANT CHANGE UP
SODIUM SERPL-SCNC: 141 MMOL/L — SIGNIFICANT CHANGE UP (ref 135–145)
WBC # BLD: 14.83 K/UL — HIGH (ref 3.8–10.5)
WBC # FLD AUTO: 14.83 K/UL — HIGH (ref 3.8–10.5)

## 2024-04-23 PROCEDURE — 99285 EMERGENCY DEPT VISIT HI MDM: CPT | Mod: 25

## 2024-04-23 PROCEDURE — 36415 COLL VENOUS BLD VENIPUNCTURE: CPT

## 2024-04-23 PROCEDURE — 80053 COMPREHEN METABOLIC PANEL: CPT

## 2024-04-23 PROCEDURE — 71045 X-RAY EXAM CHEST 1 VIEW: CPT

## 2024-04-23 PROCEDURE — 71045 X-RAY EXAM CHEST 1 VIEW: CPT | Mod: 26

## 2024-04-23 PROCEDURE — 94640 AIRWAY INHALATION TREATMENT: CPT

## 2024-04-23 PROCEDURE — 93010 ELECTROCARDIOGRAM REPORT: CPT

## 2024-04-23 PROCEDURE — 99285 EMERGENCY DEPT VISIT HI MDM: CPT

## 2024-04-23 PROCEDURE — 96374 THER/PROPH/DIAG INJ IV PUSH: CPT

## 2024-04-23 PROCEDURE — 85025 COMPLETE CBC W/AUTO DIFF WBC: CPT

## 2024-04-23 PROCEDURE — 93005 ELECTROCARDIOGRAM TRACING: CPT

## 2024-04-23 PROCEDURE — 87637 SARSCOV2&INF A&B&RSV AMP PRB: CPT

## 2024-04-23 RX ORDER — IPRATROPIUM/ALBUTEROL SULFATE 18-103MCG
3 AEROSOL WITH ADAPTER (GRAM) INHALATION ONCE
Refills: 0 | Status: COMPLETED | OUTPATIENT
Start: 2024-04-23 | End: 2024-04-23

## 2024-04-23 RX ORDER — FUROSEMIDE 40 MG
40 TABLET ORAL ONCE
Refills: 0 | Status: COMPLETED | OUTPATIENT
Start: 2024-04-23 | End: 2024-04-23

## 2024-04-23 RX ORDER — ALBUTEROL 90 UG/1
2 AEROSOL, METERED ORAL EVERY 6 HOURS
Refills: 0 | Status: DISCONTINUED | OUTPATIENT
Start: 2024-04-23 | End: 2024-05-01

## 2024-04-23 RX ORDER — AMLODIPINE BESYLATE 2.5 MG/1
5 TABLET ORAL ONCE
Refills: 0 | Status: COMPLETED | OUTPATIENT
Start: 2024-04-23 | End: 2024-04-23

## 2024-04-23 RX ADMIN — Medication 40 MILLIGRAM(S): at 20:06

## 2024-04-23 RX ADMIN — Medication 3 MILLILITER(S): at 20:24

## 2024-04-23 RX ADMIN — ALBUTEROL 2 PUFF(S): 90 AEROSOL, METERED ORAL at 21:18

## 2024-04-23 RX ADMIN — Medication 3 MILLILITER(S): at 20:06

## 2024-04-23 RX ADMIN — Medication 50 MILLIGRAM(S): at 20:06

## 2024-04-23 RX ADMIN — Medication 3 MILLILITER(S): at 19:50

## 2024-04-23 RX ADMIN — AMLODIPINE BESYLATE 5 MILLIGRAM(S): 2.5 TABLET ORAL at 20:06

## 2024-04-23 NOTE — ED ADULT NURSE NOTE - CAS TRG GEN SKIN COLOR
----- Message from Rhett Pennington DO sent at 5/29/2019  9:00 PM EDT -----  Please let pt know that labs are good other than her K+ is a little low. Recommend we repeat this early next wk. Non-fasting ok. Let me know if questions, thanks! Normal for race

## 2024-04-23 NOTE — ED PROVIDER NOTE - ATTENDING CONTRIBUTION TO CARE
78-year-old male with history of nonischemic cardiomyopathy, HTN, pericardial effusion s/p pericardial window presenting with fever, congestion, cough.  Patient denies chest pain, difficulty breathing, nausea, vomiting, abdominal pain, focal weakness.  Patient denies recent travel or sick contacts.    POCUS without signs of  or pericardial effusion.  On examination patient's exam is consistent with viral upper respiratory infection.  Obtain chest x-ray to rule out pneumonia and pleural effusions.  Patient noted to have wheezing on examination will give nebulizers and symptom control.  Likely discharge with outpatient follow-up.  Patient signed out pending results and reevaluation

## 2024-04-23 NOTE — ED PROVIDER NOTE - CLINICAL SUMMARY MEDICAL DECISION MAKING FREE TEXT BOX
78-year-old male with fever, cough, congestion.  hemodynamically stable, saturating well on room air no acute respiratory distress, neurovascular intact.  Differential includes viral illness, pneumonia, electrolyte derangement.

## 2024-04-23 NOTE — ED PROVIDER NOTE - OBJECTIVE STATEMENT
78-year-old male with history of nonischemic cardiomyopathy, HTN, pericardial effusion s/p pericardial window presenting with fever, congestion, cough.  Patient denies chest pain, difficulty breathing, nausea, vomiting, abdominal pain, focal weakness.  Patient denies recent travel or sick contacts.

## 2024-04-23 NOTE — ED PROVIDER NOTE - PHYSICAL EXAMINATION
General: Well appearing in no acute distress, alert and cooperative  Head: Normocephalic, atraumatic  Eyes: PERRLA, no conjunctival injection, no scleral icterus, EOMI  ENMT: Atraumatic external nose and ears  Neck: Soft and supple, full ROM without pain  Cardiac: Regular rate and regular rhythm, no murmurs  Resp: Unlabored respiratory effort, lungs CTAB  Abd: Soft, non-tender, non-distended  MSK: Spine midline and non-tende  Skin: Warm and dry  Neuro: AO x 3, moves all extremities symmetrically, Motor strength and sensation grossly intact

## 2024-04-23 NOTE — ED PROVIDER NOTE - NSFOLLOWUPINSTRUCTIONS_ED_ALL_ED_FT
Please follow-up with your primary care doctor.  Please call for an appointment in the next 48 hours but if you cannot follow-up with your primary care doctor please return to the Emergency Department for any urgent issues.    You were given a copy of the tests performed today.  Please bring the results with you and review them with your primary care doctor.    If you have any worsening of symptoms or any other concerns please return to the Emergency Department immediately.    Please continue taking your home medications as directed. - Coker barriga inhalador de albuterol 2 inhalaciones cada 4 horas para las sibilancias.  - Chacorta prednisona 50 mg hong vez al día norma los próximos 4 días.  - Regrese a la carrol de emergencias en los próximos 2 o 3 días si barriga dificultad para respirar y sibilancias no mejoran.    Por favor monika un seguimiento con barriga médico de atención primaria. Llame para programar hong roberta en las próximas 48 horas, rene si no puede hacer un seguimiento con barriga médico de atención primaria, regrese al Departamento de Emergencias si tiene algún problema urgente.    Se le entregó hong copia de las pruebas realizadas hoy. Traiga los resultados con usted y revíselos con barriga médico de atención primaria.    Si neelam síntomas empeoran o tiene alguna otra inquietud, regrese al Departamento de Emergencias de inmediato.    Continúe tomando neelam medicamentos caseros según las indicaciones.

## 2024-04-23 NOTE — ED PROVIDER NOTE - PATIENT PORTAL LINK FT
You can access the FollowMyHealth Patient Portal offered by [facility] by registering at the following website: [email or web address]. By joining [facility]’s FollowMyHealth portal, you will also be able to view your health information using other applications (apps) compatible with our system.

## 2024-04-23 NOTE — ED PROVIDER NOTE - WR INTERPRETATION 1
CXR negative - Small R base infiltrate unchanged from XR in January, otherwise No consolidation, No atelectasis seen

## 2024-04-23 NOTE — ED ADULT NURSE NOTE - OBJECTIVE STATEMENT
pt reports to the ER c/o of body aches and cough and increasing SOB pt a&ox3, Pakistani speaking but daughter remains at bedside. pt states he has been having these symptoms for the past 2 days. denies taking medication OTC for relief. pt remains on RA. NAD noted at this time.

## 2024-04-23 NOTE — ED ADULT NURSE REASSESSMENT NOTE - NS ED NURSE REASSESS COMMENT FT1
Assumed care of pt. Pt hypertensive and reports SOB and congestion. SpO2 96% on room air. Pt denies chest pain. Airway patent respirations even unlabored. BP medications and nebulizer given per orders-see mar.

## 2024-04-23 NOTE — ED PROVIDER NOTE - PROGRESS NOTE DETAILS
Jesse: Received pt in signout from Estela Mcqueen and Ab. Pt symptomatically improved after Duo-Neb treatments, would like to go home. Pt w/good air flow and faint wheezes b/l. Not in resp distress on RA, 94-96% on RA. Prednisone sent to pharmacy. Strict return precautions provided, pt agrees to return to the ER if symptoms do not improve in the next 2-3 days. Advised to take albuterol inhaler 2 puffs every 4 hours.

## 2024-05-01 ENCOUNTER — EMERGENCY (EMERGENCY)
Facility: HOSPITAL | Age: 79
LOS: 1 days | Discharge: HOME | End: 2024-05-01
Attending: EMERGENCY MEDICINE
Payer: MEDICARE

## 2024-05-01 VITALS
SYSTOLIC BLOOD PRESSURE: 227 MMHG | DIASTOLIC BLOOD PRESSURE: 75 MMHG | HEART RATE: 60 BPM | TEMPERATURE: 97 F | RESPIRATION RATE: 24 BRPM | OXYGEN SATURATION: 98 % | WEIGHT: 203.71 LBS

## 2024-05-01 VITALS
RESPIRATION RATE: 18 BRPM | HEART RATE: 57 BPM | DIASTOLIC BLOOD PRESSURE: 63 MMHG | SYSTOLIC BLOOD PRESSURE: 207 MMHG | OXYGEN SATURATION: 97 %

## 2024-05-01 DIAGNOSIS — Z90.49 ACQUIRED ABSENCE OF OTHER SPECIFIED PARTS OF DIGESTIVE TRACT: Chronic | ICD-10-CM

## 2024-05-01 DIAGNOSIS — Z98.890 OTHER SPECIFIED POSTPROCEDURAL STATES: Chronic | ICD-10-CM

## 2024-05-01 LAB
ANION GAP SERPL CALC-SCNC: 12 MMOL/L — SIGNIFICANT CHANGE UP (ref 5–17)
APPEARANCE UR: CLEAR — SIGNIFICANT CHANGE UP
BACTERIA # UR AUTO: NEGATIVE /HPF — SIGNIFICANT CHANGE UP
BASOPHILS # BLD AUTO: 0 K/UL — SIGNIFICANT CHANGE UP (ref 0–0.2)
BASOPHILS NFR BLD AUTO: 0 % — SIGNIFICANT CHANGE UP (ref 0–2)
BILIRUB UR-MCNC: NEGATIVE — SIGNIFICANT CHANGE UP
BUN SERPL-MCNC: 23.6 MG/DL — HIGH (ref 8–20)
CALCIUM SERPL-MCNC: 8.6 MG/DL — SIGNIFICANT CHANGE UP (ref 8.4–10.5)
CHLORIDE SERPL-SCNC: 104 MMOL/L — SIGNIFICANT CHANGE UP (ref 96–108)
CO2 SERPL-SCNC: 24 MMOL/L — SIGNIFICANT CHANGE UP (ref 22–29)
COLOR SPEC: YELLOW — SIGNIFICANT CHANGE UP
CREAT SERPL-MCNC: 1.25 MG/DL — SIGNIFICANT CHANGE UP (ref 0.5–1.3)
DIFF PNL FLD: ABNORMAL
EGFR: 59 ML/MIN/1.73M2 — LOW
EOSINOPHIL # BLD AUTO: 0.21 K/UL — SIGNIFICANT CHANGE UP (ref 0–0.5)
EOSINOPHIL NFR BLD AUTO: 1.8 % — SIGNIFICANT CHANGE UP (ref 0–6)
GLUCOSE SERPL-MCNC: 129 MG/DL — HIGH (ref 70–99)
GLUCOSE UR QL: NEGATIVE MG/DL — SIGNIFICANT CHANGE UP
HCT VFR BLD CALC: 42 % — SIGNIFICANT CHANGE UP (ref 39–50)
HGB BLD-MCNC: 14.2 G/DL — SIGNIFICANT CHANGE UP (ref 13–17)
KETONES UR-MCNC: NEGATIVE MG/DL — SIGNIFICANT CHANGE UP
LEUKOCYTE ESTERASE UR-ACNC: NEGATIVE — SIGNIFICANT CHANGE UP
LYMPHOCYTES # BLD AUTO: 1.19 K/UL — SIGNIFICANT CHANGE UP (ref 1–3.3)
LYMPHOCYTES # BLD AUTO: 10.4 % — LOW (ref 13–44)
MANUAL SMEAR VERIFICATION: SIGNIFICANT CHANGE UP
MCHC RBC-ENTMCNC: 29.6 PG — SIGNIFICANT CHANGE UP (ref 27–34)
MCHC RBC-ENTMCNC: 33.8 GM/DL — SIGNIFICANT CHANGE UP (ref 32–36)
MCV RBC AUTO: 87.7 FL — SIGNIFICANT CHANGE UP (ref 80–100)
MONOCYTES # BLD AUTO: 0.6 K/UL — SIGNIFICANT CHANGE UP (ref 0–0.9)
MONOCYTES NFR BLD AUTO: 5.2 % — SIGNIFICANT CHANGE UP (ref 2–14)
MYELOCYTES NFR BLD: 1.7 % — HIGH (ref 0–0)
NEUTROPHILS # BLD AUTO: 9.16 K/UL — HIGH (ref 1.8–7.4)
NEUTROPHILS NFR BLD AUTO: 77.4 % — HIGH (ref 43–77)
NEUTS BAND # BLD: 2.6 % — SIGNIFICANT CHANGE UP (ref 0–8)
NITRITE UR-MCNC: NEGATIVE — SIGNIFICANT CHANGE UP
PH UR: 6 — SIGNIFICANT CHANGE UP (ref 5–8)
PLAT MORPH BLD: NORMAL — SIGNIFICANT CHANGE UP
PLATELET # BLD AUTO: 234 K/UL — SIGNIFICANT CHANGE UP (ref 150–400)
POTASSIUM SERPL-MCNC: 4.1 MMOL/L — SIGNIFICANT CHANGE UP (ref 3.5–5.3)
POTASSIUM SERPL-SCNC: 4.1 MMOL/L — SIGNIFICANT CHANGE UP (ref 3.5–5.3)
PROT UR-MCNC: NEGATIVE MG/DL — SIGNIFICANT CHANGE UP
RBC # BLD: 4.79 M/UL — SIGNIFICANT CHANGE UP (ref 4.2–5.8)
RBC # FLD: 13.6 % — SIGNIFICANT CHANGE UP (ref 10.3–14.5)
RBC BLD AUTO: NORMAL — SIGNIFICANT CHANGE UP
RBC CASTS # UR COMP ASSIST: 6 /HPF — HIGH (ref 0–4)
SODIUM SERPL-SCNC: 140 MMOL/L — SIGNIFICANT CHANGE UP (ref 135–145)
SP GR SPEC: 1.01 — SIGNIFICANT CHANGE UP (ref 1–1.03)
SQUAMOUS # UR AUTO: 0 /HPF — SIGNIFICANT CHANGE UP (ref 0–5)
UROBILINOGEN FLD QL: 0.2 MG/DL — SIGNIFICANT CHANGE UP (ref 0.2–1)
VARIANT LYMPHS # BLD: 0.9 % — SIGNIFICANT CHANGE UP (ref 0–6)
WBC # BLD: 11.45 K/UL — HIGH (ref 3.8–10.5)
WBC # FLD AUTO: 11.45 K/UL — HIGH (ref 3.8–10.5)
WBC UR QL: 1 /HPF — SIGNIFICANT CHANGE UP (ref 0–5)

## 2024-05-01 PROCEDURE — 81001 URINALYSIS AUTO W/SCOPE: CPT

## 2024-05-01 PROCEDURE — 93010 ELECTROCARDIOGRAM REPORT: CPT

## 2024-05-01 PROCEDURE — 87086 URINE CULTURE/COLONY COUNT: CPT

## 2024-05-01 PROCEDURE — 71045 X-RAY EXAM CHEST 1 VIEW: CPT | Mod: 26

## 2024-05-01 PROCEDURE — 71045 X-RAY EXAM CHEST 1 VIEW: CPT

## 2024-05-01 PROCEDURE — 85025 COMPLETE CBC W/AUTO DIFF WBC: CPT

## 2024-05-01 PROCEDURE — 36415 COLL VENOUS BLD VENIPUNCTURE: CPT

## 2024-05-01 PROCEDURE — 93005 ELECTROCARDIOGRAM TRACING: CPT

## 2024-05-01 PROCEDURE — 99285 EMERGENCY DEPT VISIT HI MDM: CPT | Mod: GC

## 2024-05-01 PROCEDURE — 99285 EMERGENCY DEPT VISIT HI MDM: CPT | Mod: 25

## 2024-05-01 PROCEDURE — 96374 THER/PROPH/DIAG INJ IV PUSH: CPT | Mod: XU

## 2024-05-01 PROCEDURE — 51702 INSERT TEMP BLADDER CATH: CPT

## 2024-05-01 PROCEDURE — 80048 BASIC METABOLIC PNL TOTAL CA: CPT

## 2024-05-01 PROCEDURE — 96375 TX/PRO/DX INJ NEW DRUG ADDON: CPT | Mod: XU

## 2024-05-01 RX ORDER — HYDRALAZINE HCL 50 MG
10 TABLET ORAL ONCE
Refills: 0 | Status: COMPLETED | OUTPATIENT
Start: 2024-05-01 | End: 2024-05-01

## 2024-05-01 RX ORDER — HYDROMORPHONE HYDROCHLORIDE 2 MG/ML
1 INJECTION INTRAMUSCULAR; INTRAVENOUS; SUBCUTANEOUS ONCE
Refills: 0 | Status: DISCONTINUED | OUTPATIENT
Start: 2024-05-01 | End: 2024-05-01

## 2024-05-01 RX ORDER — LABETALOL HCL 100 MG
10 TABLET ORAL ONCE
Refills: 0 | Status: DISCONTINUED | OUTPATIENT
Start: 2024-05-01 | End: 2024-05-01

## 2024-05-01 RX ADMIN — HYDROMORPHONE HYDROCHLORIDE 1 MILLIGRAM(S): 2 INJECTION INTRAMUSCULAR; INTRAVENOUS; SUBCUTANEOUS at 04:36

## 2024-05-01 RX ADMIN — Medication 10 MILLIGRAM(S): at 04:57

## 2024-05-01 NOTE — ED PROVIDER NOTE - OBJECTIVE STATEMENT
77 y/o M with a PMHx of nonischemic cardiomyopathy, HTN, pericardial effusion s/p pericardial window presenting with urinary retention.     Patient denies chest pain, difficulty breathing, nausea, vomiting, abdominal pain, focal weakness.  Patient denies recent travel or sick contacts. 77 y/o M with a PMHx of nonischemic cardiomyopathy, HTN, pericardial effusion s/p pericardial window, BPH presenting with urinary retention. According to Daughter,  around 4:00pm, pt began having trouble with urination.  is able to pass small amount of urine.  has had SOB for the last week, with productive cough. Patient denies chest pain, difficulty breathing, nausea, vomiting, abdominal pain, focal weakness, recent travel or sick contacts. 79 y/o M with a PMHx of nonischemic cardiomyopathy, HTN, pericardial effusion s/p pericardial window, BPH presenting with urinary retention. According to Daughter,  around 4:00pm, pt began having trouble with urination.  is able to pass small amount of urine. Endorses previous history of urinary retention requiring extended baum use.  has had SOB for the last week, with productive cough. Patient denies chest pain, difficulty breathing, nausea, vomiting, abdominal pain, focal weakness, recent travel or sick contacts.

## 2024-05-01 NOTE — ED PROVIDER NOTE - CLINICAL SUMMARY MEDICAL DECISION MAKING FREE TEXT BOX
- CBC, CMP, UA, UCx  - to give 10mg labetalol, 1mg hydromorphone.  - Urology consulted. 77 y/o M with a PMHx of nonischemic cardiomyopathy, HTN, pericardial effusion s/p pericardial window, BPH presenting with acute urinary retention. Bedside US concerning for retention given distended bladder. Physical exam: Uncomfortable, s1/s2, CTA b/l, NTND, +ttp Suprapubic tenderness, FROMx4, A&Ox3 BP . Attempted to pass 16/18 baum and 16 coude catheter with no success. /60. Likely in urinary retention.  Will  CBC, CMP, UA, UCx, to give 10mg labetalol, 10mg hydralazine, 1mg hydromorphone. Urology consulted. To follow up. labs, recs.

## 2024-05-01 NOTE — ED ADULT TRIAGE NOTE - CHIEF COMPLAINT QUOTE
Ambulatory to ED with c/o difficulty/painful/frequent urination since 1600, and SOB/cough. hx BPH, HTN. able to urinate prior to triage, light yellow.

## 2024-05-01 NOTE — ED PROVIDER NOTE - RATE
Review of Systems:  	•	CONSTITUTIONAL - no fever, no diaphoresis, no chills  	•	SKIN - no rash  	•	HEMATOLOGIC - no bleeding, no bruising  	•	EYES - no eye pain, no blurry vision  	•	ENT - no change in hearing, no sore throat, no ear pain or tinnitus  	•	RESPIRATORY -  shortness of breath,  cough  	•	CARDIAC - no chest pain, no palpitations  	•	GI - no abd pain, no nausea, no vomiting, no diarrhea, no constipation    	•	MUSCULOSKELETAL - no joint paint, no swelling, no redness  	•	NEUROLOGIC - no weakness, no headache, no paresthesias, no LOC  	•	PSYCH - no anxiety, non suicidal, non homicidal, no hallucination, no depression 60

## 2024-05-01 NOTE — ED ADULT NURSE NOTE - OBJECTIVE STATEMENT
assumed pt care at this time pt c/o sob and difficulty urinating since 1600 today. States has had this problem in the past from enlarged prostate. pt noted hypertensive 235/60 HR71 MD Paniagua notified and on way to bedside. pt placed on cardiac monitor will obtained EKG. pt denies chest pain headache or vision changes

## 2024-05-01 NOTE — ED PROVIDER NOTE - ATTENDING CONTRIBUTION TO CARE
I performed a face to face bedside interview with patient regarding history of present illness, review of symptoms and past medical history. I completed an independent physical exam.  I have discussed patient's plan of care with resident.   I agree with note as stated above including HISTORY OF PRESENT ILLNESS, HIV, PAST MEDICAL/SURGICAL/FAMILY/SOCIAL HISTORY, ALLERGIES AND HOME MEDICATIONS, REVIEW OF SYSTEMS, PHYSICAL EXAM, MEDICAL DECISION MAKING and any PROGRESS NOTES during the time I functioned as the attending physician for this patient unless otherwise noted. My brief assessment is as follows:   General in moderate distress suprapubic region respiratory clear cardiac no murmur abdomen positive tenderness palpation suprapubic region neuro intact   2 attempts to pass Velasquez unsuccessful POCUS ultrasound showing marked urinary retention urology consult obtained awaiting final urology recommendations chest x-ray clear EKG unchanged likely needs emergent OR for suprapubic tube placement versus cystoscopy with urology a.m. attending aware of plan of care

## 2024-05-01 NOTE — ED PROVIDER NOTE - NSFOLLOWUPINSTRUCTIONS_ED_ALL_ED_FT
Urinary Retention    Urinary retention is the inability to completely empty your bladder. This is a common problem in older men, especially with enlarged prostates. If you are sent home with a baum catheter and a drainage system make sure to keep the drainage bag emptied and lower than your catheter. Keep the baum catheter in until you follow up with a urologist.    SEEK IMMEDIATE MEDICAL CARE IF YOU DEVELOP THE FOLLOWING SYMPTOMS: the catheter stops draining urine, the catheter falls out, abdominal pain, nausea/vomiting, or chills/fever.

## 2024-05-01 NOTE — CHART NOTE - NSCHARTNOTEFT_GEN_A_CORE
Called to see pt for urinary rtention, difficult baum insertion  pt had prior attempted cystoscopy months ago for same issue, ultimately needed cystoscopy in OR, and SPT placement which was subsequently removed and pt was voiding  pt had multiple unsuccessful baum attempts, bladder scan showed 800ml volume  pt had bedside cystoscopy, dilation of urethra and placement of baum over a wire   see brief op note    pt may be d/c with baum to leg bag  provide large bag for nighttime use  pt to f/u with Dr. Steven Oglesby in office in 1 week Called to see pt for urinary rtention, difficult baum insertion  pt had prior attempted cystoscopy months ago for same issue, ultimately needed cystoscopy in OR, and SPT placement which was subsequently removed and pt was voiding  pt had multiple unsuccessful baum attempts, bladder scan showed 800ml volume  pt had bedside cystoscopy, dilation of urethra and placement of baum over a wire   see brief op note    pt may be d/c with baum to leg bag  provide large bag for nighttime use  pt to f/u with Dr. Steven Oglesby in office in 1 week    would also send pt home on bactrim for 5 days due to procedure and manipulation

## 2024-05-01 NOTE — BRIEF OPERATIVE NOTE - OPERATION/FINDINGS
bedside cystoscopy performed, found to have multiple false passages in proximal urethra, very enlarged prostate, trilobar hypertrophy, wire inserted into bladder, serially dilated urethra to 16 Fr, 14 Fr baum placed over wire, yellow urine

## 2024-05-01 NOTE — ED PROVIDER NOTE - PATIENT PORTAL LINK FT
You can access the FollowMyHealth Patient Portal offered by [facility] by registering at the following website: [email or web address]. By joining [facility]’s FollowMyHealth portal, you will also be able to view your health information using other applications (apps) compatible with our system.

## 2024-05-01 NOTE — BRIEF OPERATIVE NOTE - NSICDXBRIEFPOSTOP_GEN_ALL_CORE_FT
POST-OP DIAGNOSIS:  Acute urinary retention 01-May-2024 08:29:27  Shirley Garcia  Urethral false passage 01-May-2024 08:29:36  Shirley Garcia

## 2024-05-01 NOTE — ED PROVIDER NOTE - PROGRESS NOTE DETAILS
Freida WARE: Attempted to pass baum x2 and was not able too. Reviewed previous urology note, notable for multiple false passage require suprapubic placement. Consulted Urology for retention. Kelly: Patient seen by urology and bedside cystoscopy was done, dilation of ureter and placement of a Velasquez over a wire was performed with drainage of bladder.  Patient feels much improved at this time, blood pressure improved to 160s SBP.  Advised to follow-up with urology in 1 week Bactrim sent to pharmacy as per urology recommendations.

## 2024-05-01 NOTE — ED PROVIDER NOTE - PHYSICAL EXAMINATION
· CONSTITUTIONAL: Uncomfortable appearing.  · HEENMT: Airway patent, normal appearing mouth, nose, throat, neck supple with full range of motion, no cervical adenopathy.  · EYES: Pupils equal, round and reactive to light, Extra-ocular movement intact, eyes are clear b/l  · CARDIAC: Regular rate and rhythm, Heart sounds S1/S2 present,   · RESPIRATORY: No respiratory distress. No stridor, Lungs sounds clear with good aeration bilaterally.  · GASTROINTESTINAL: Abdomen soft, non tender, , non-distended,  : Suprapubic ttp noted  · MUSCULOSKELETAL: Spine appears normal, movement of extremities grossly intact.  · NEUROLOGICAL: Alert and interactive, no focal deficits  · SKIN: No cyanosis, no pallor, no jaundice, no rash  · PSYCHIATRIC: Alert and oriented to person, place and time. Normal mood and affect, no apparent risk to self or others

## 2024-05-01 NOTE — PROCEDURE NOTE - ADDITIONAL PROCEDURE DETAILS
Patient has known history of enlarged prostate and previous attempt in 01/2024 that resulted in cysto with failed baum insertion and a SPT. ED tried 3 times with a 18 and 16 coude, I attempted once with a 14fr coude, unable to pass successful

## 2024-05-01 NOTE — BRIEF OPERATIVE NOTE - NSICDXBRIEFPROCEDURE_GEN_ALL_CORE_FT
PROCEDURES:  Flexible cystoscopy 01-May-2024 08:28:51  Shirley Garcia  Insertion, Velasquez catheter, complex 01-May-2024 08:29:00  Shirley Garcia  Dilation, stricture, urethra, using urethral sound 01-May-2024 08:32:02  Shirley Garcia

## 2024-05-01 NOTE — ED ADULT NURSE REASSESSMENT NOTE - NS ED NURSE REASSESS COMMENT FT1
Assumed care at 0715, received report from Vanessa RN, pt currently undergoing procedure with urology at bedside to place baum, no distress noted.
attempted to obtain baum cath insert, unable to advance catheter. MD Berrios notified, pending coude tip placement by MD
Pt a&ox4,  and cardiac monitoring placed, urology successfully placed baum, pt VSS, respirations even and unlabored on room air, no distress noted.

## 2024-05-01 NOTE — CONSULT NOTE ADULT - SUBJECTIVE AND OBJECTIVE BOX
HPI: 79 y/o M with a PMHx of nonischemic cardiomyopathy, HTN, pericardial effusion s/p pericardial window, BPH presenting with urinary retention. According to Daughter,  around 4:00pm, pt began having trouble with urination. States is able to pass small amount of urine. Endorses previous history of urinary retention requiring extended baum use and SPT insertion in Jan 2024.  Patient denies chest pain, difficulty breathing, nausea, vomiting, abdominal pain, focal weakness, recent travel or sick contacts.    UROLOGY: consulted for urinary retention and difficult baum insertion. ED states bladder scan showed 800cc, they attempted 18fr and 16fr coude foleys without success. Patient was here in 01/2024 with similar complaint of urinary retention. In previous admission, attempted baum bedside but failed, taken to OR for Cysto and baum insertion which was also unsuccessful and ended up getting a SPT which was removed before discharge.

## 2024-05-01 NOTE — ED ADULT NURSE NOTE - NSFALLUNIVINTERV_ED_ALL_ED
Bed/Stretcher in lowest position, wheels locked, appropriate side rails in place/Call bell, personal items and telephone in reach/Instruct patient to call for assistance before getting out of bed/chair/stretcher/Non-slip footwear applied when patient is off stretcher/[place] to call system/Physically safe environment - no spills, clutter or unnecessary equipment/Purposeful proactive rounding/Room/bathroom lighting operational, light cord in reach

## 2024-05-01 NOTE — CONSULT NOTE ADULT - ASSESSMENT
77 y/o M with a PMHx of nonischemic cardiomyopathy, HTN, pericardial effusion s/p pericardial window, BPH presenting with urinary retention. Endorses previous history of urinary retention requiring extended baum use and SPT insertion in Jan 2024.  Patient denies chest pain, difficulty breathing, nausea, vomiting, abdominal pain, focal weakness, recent travel or sick contacts. ED states bladder scan showed 800cc, they attempted 18fr and 16fr coude foleys without success. I attempted a 14fr coude without success.       Plan per Dr Stallings: Will attempt cysto with baum insertion at bedside    Rest of plan pending

## 2024-05-02 LAB
CULTURE RESULTS: SIGNIFICANT CHANGE UP
SPECIMEN SOURCE: SIGNIFICANT CHANGE UP

## 2024-05-06 DIAGNOSIS — R33.9 RETENTION OF URINE, UNSPECIFIED: ICD-10-CM

## 2024-05-06 DIAGNOSIS — I42.9 CARDIOMYOPATHY, UNSPECIFIED: ICD-10-CM

## 2024-05-06 DIAGNOSIS — I10 ESSENTIAL (PRIMARY) HYPERTENSION: ICD-10-CM

## 2024-05-06 DIAGNOSIS — N40.1 BENIGN PROSTATIC HYPERPLASIA WITH LOWER URINARY TRACT SYMPTOMS: ICD-10-CM

## 2024-05-06 DIAGNOSIS — R33.8 OTHER RETENTION OF URINE: ICD-10-CM

## 2024-05-09 ENCOUNTER — APPOINTMENT (OUTPATIENT)
Dept: UROLOGY | Facility: CLINIC | Age: 79
End: 2024-05-09
Payer: MEDICARE

## 2024-05-09 VITALS
SYSTOLIC BLOOD PRESSURE: 204 MMHG | BODY MASS INDEX: 29.68 KG/M2 | WEIGHT: 212 LBS | DIASTOLIC BLOOD PRESSURE: 79 MMHG | HEIGHT: 71 IN | HEART RATE: 71 BPM

## 2024-05-09 PROCEDURE — 99212 OFFICE O/P EST SF 10 MIN: CPT

## 2024-05-09 PROCEDURE — 99214 OFFICE O/P EST MOD 30 MIN: CPT

## 2024-05-09 NOTE — HISTORY OF PRESENT ILLNESS
[FreeTextEntry1] : patient was in the ER for inability to void.  Multiple catheter attempts unsuccessful and he needed a cysto at the bedside by Dr. Carter to place baum.

## 2024-05-09 NOTE — ASSESSMENT
[FreeTextEntry1] : Impression:  urinary retention markedly enlarged prostate  Plan: refer to Dr. Tiwari for Consideration of HoLEP.

## 2024-05-09 NOTE — PHYSICAL EXAM
[General Appearance - Well Developed] : well developed [Edema] : no peripheral edema [] : no respiratory distress [Normal Station and Gait] : the gait and station were normal for the patient's age [Oriented To Time, Place, And Person] : oriented to person, place, and time [Affect] : the affect was normal [Mood] : the mood was normal

## 2024-05-13 ENCOUNTER — APPOINTMENT (OUTPATIENT)
Dept: UROLOGY | Facility: CLINIC | Age: 79
End: 2024-05-13

## 2024-05-15 NOTE — ED PROVIDER NOTE - CADM POA URETHRAL CATHETER
Principal Orthopedic Problem: Left intertrochanteric hip fracture, closed, displaced, initial encounter  Fall from standing     03/31/24: Dr. Walker  Open reduction internal fixation left intertrochanteric hip fracture with intramedullary nail     Ms. Costa is here today for a post-operative visit    Interval History:  she reports that she is doing well.   she is at home . she is participating in PT/OT.  Pain is controlled.  she is  taking pain medication.  Tylenol   she denies fever, chills, and sweats .     Patient recently d/c from the hospital. At that visit she was found to have bilateral lower extremity cellulitis which was treated with doxycycline. ISince stopping the medication she has noticed a return of symptoms.       Physical exam:    Patient arrives to exam room: wheelchair.  Patient is  accompanied    .  Incision is clean, dry and intact.   Healing well no signs of breakdown or infection.    Bilateral lower extremity has return of cellulitis that what treated by oral abx after her most recent hospital visit.     RADS: All pertinent images were reviewed by myself:   Intramedullary valeri and femoral head nail are in place. Alignment of the intertrochanteric fracture is excellent. Lesser trochanter is  from the shaft. Vascular calcification is noted.     Assessment:  Post-op visit ( 6 weeks)    Plan:  Current care, treatment plan, precautions, activity level/ modifications, limitations, rehabilitation exercises and proposed future treatment were discussed with the patient. We discussed the need to monitor for changes in symptoms and condition and report them to the physician.  Discussed importance of compliance with all appointments and follow up examinations.     WOUND CARE :  - The patient was advised to keep the incision clean and dry for the next 24 hours after which she may wash the area with antibacterial soap in the shower. Will not submerge until the incision is completely  healed  -Patient was advised to monitor wound closely and multiple times daily for any problems. Call clinic immediately or report to ED for immediate medical attention for any complications including reopening of wound, drainage, purulence, redness, streaking, odor, pain out of proportion, fever, chills, etc.       ACTIVITY:   - as tolerated  -range of motion as tolerated    - WBAT      -PT/OT, [place], Patient is responsible to establish and continue care      PAIN MEDICATION:   - Multimodal pain control  - Pain medication: refill was not needed  - Pain medication refill policy provided to patient for review, yes.    - Patient was informed a multi-modal approach is used to treat their pain. With the goal to get off of narcotic pain medication and discontinue as soon as possible.   - ice and elevation to reduce pain and swelling     DVT PROPHYLAXIS:   - Eliquis 2.5 mg bid    OTHER:   Restart doxycycline and refer to ID. Id said they would reach out to her for an appointment     FOLLOW UP:   - Patient will follow up in the clinic in g weeks, sooner if any concerns.  - X-ray of her femur is needed.       If there are any questions prior to scheduled follow up, the patient was instructed to contact the office           No

## 2024-05-20 ENCOUNTER — APPOINTMENT (OUTPATIENT)
Dept: UROLOGY | Facility: CLINIC | Age: 79
End: 2024-05-20
Payer: MEDICARE

## 2024-05-20 PROCEDURE — 99215 OFFICE O/P EST HI 40 MIN: CPT

## 2024-05-20 NOTE — HISTORY OF PRESENT ILLNESS
[FreeTextEntry1] : 79 yo M referred to consult for HOLEP known colon cancer, s/p two surgeries and chemotherapy also history of kidney cancer  saw Dr. Oglesby known large prostate > 7 cm with retention and baum catheter since 05/2024  Discussed Laser enucleation of prostate with morcellation (HOLEP/THuLEP). Procedure, in hospital stay and recovery explained. Indications, options including chronic baum catheter, suprapubic catheter, intermittent self-catheterization, transurethral resection of prostate, transurethral vaporization of prostate with laser or electrocautery, open or laparoscopic enucleation of the prostate, all discussed. Potential complications of transurethral holmium or thulium laser enucleation of prostate with morcellation discussed. This included risk of infection, bleeding, transfusion, conversion to open enucleation, need for staged procedure, adjacent organ injury, bladder injury, clot retention of urine requiring return to the operating room for cystoscopy clot evacuation, prolonged duration of baum catheterization, urethral stricture, transient or permanent urinary incontinence,  retrograde ejaculation as a permanent and irreversible complication, redo or staged  surgery due to equipment malfunction, anesthetic complications, and cardiac complications all discussed.  will also need baum exchange before will need full clearance and evaluation before surgery   plan: - LEP - scheduled baum exchange in 2-3 weeks

## 2024-06-10 ENCOUNTER — APPOINTMENT (OUTPATIENT)
Dept: UROLOGY | Facility: CLINIC | Age: 79
End: 2024-06-10
Payer: MEDICARE

## 2024-06-10 VITALS — DIASTOLIC BLOOD PRESSURE: 70 MMHG | HEART RATE: 69 BPM | SYSTOLIC BLOOD PRESSURE: 195 MMHG

## 2024-06-10 DIAGNOSIS — N13.8 BENIGN PROSTATIC HYPERPLASIA WITH LOWER URINARY TRACT SYMPMS: ICD-10-CM

## 2024-06-10 DIAGNOSIS — N40.1 BENIGN PROSTATIC HYPERPLASIA WITH LOWER URINARY TRACT SYMPMS: ICD-10-CM

## 2024-06-10 PROCEDURE — 51702 INSERT TEMP BLADDER CATH: CPT

## 2024-06-17 RX ORDER — SPIRONOLACTONE 25 MG/1
25 TABLET ORAL
Qty: 90 | Refills: 0 | Status: ACTIVE | COMMUNITY
Start: 2022-11-10 | End: 1900-01-01

## 2024-06-17 RX ORDER — FUROSEMIDE 40 MG/1
40 TABLET ORAL
Qty: 90 | Refills: 0 | Status: ACTIVE | COMMUNITY
Start: 2022-11-10 | End: 1900-01-01

## 2024-06-17 RX ORDER — SACUBITRIL AND VALSARTAN 97; 103 MG/1; MG/1
97-103 TABLET, FILM COATED ORAL TWICE DAILY
Qty: 180 | Refills: 3 | Status: ACTIVE | COMMUNITY
Start: 2023-01-05 | End: 1900-01-01

## 2024-06-17 RX ORDER — ATORVASTATIN CALCIUM 40 MG/1
40 TABLET, FILM COATED ORAL
Qty: 90 | Refills: 3 | Status: ACTIVE | COMMUNITY
Start: 2022-11-10 | End: 1900-01-01

## 2024-06-17 RX ORDER — ASPIRIN ENTERIC COATED TABLETS 81 MG 81 MG/1
81 TABLET, DELAYED RELEASE ORAL
Qty: 90 | Refills: 3 | Status: ACTIVE | COMMUNITY
Start: 2022-11-10 | End: 1900-01-01

## 2024-06-24 ENCOUNTER — APPOINTMENT (OUTPATIENT)
Dept: CARDIOLOGY | Facility: CLINIC | Age: 79
End: 2024-06-24

## 2024-07-08 ENCOUNTER — APPOINTMENT (OUTPATIENT)
Dept: UROLOGY | Facility: CLINIC | Age: 79
End: 2024-07-08

## 2024-07-12 ENCOUNTER — APPOINTMENT (OUTPATIENT)
Dept: UROLOGY | Facility: CLINIC | Age: 79
End: 2024-07-12
Payer: MEDICARE

## 2024-07-12 DIAGNOSIS — N13.8 BENIGN PROSTATIC HYPERPLASIA WITH LOWER URINARY TRACT SYMPMS: ICD-10-CM

## 2024-07-12 DIAGNOSIS — N40.1 BENIGN PROSTATIC HYPERPLASIA WITH LOWER URINARY TRACT SYMPMS: ICD-10-CM

## 2024-07-12 DIAGNOSIS — N39.0 URINARY TRACT INFECTION, SITE NOT SPECIFIED: ICD-10-CM

## 2024-07-12 PROCEDURE — 51702 INSERT TEMP BLADDER CATH: CPT

## 2024-07-12 RX ORDER — CIPROFLOXACIN HYDROCHLORIDE 500 MG/1
500 TABLET, FILM COATED ORAL
Qty: 14 | Refills: 0 | Status: ACTIVE | COMMUNITY
Start: 2024-07-12 | End: 1900-01-01

## 2024-07-20 PROBLEM — N39.0 ACUTE UTI: Status: ACTIVE | Noted: 2023-03-02

## 2024-08-05 ENCOUNTER — APPOINTMENT (OUTPATIENT)
Dept: UROLOGY | Facility: HOSPITAL | Age: 79
End: 2024-08-05

## 2024-08-08 NOTE — PATIENT PROFILE ADULT - NSPRESCRUSEDDRG_GEN_A_NUR
HISTORY and PHYSICAL  [facility]       NAME:  Theresa Bedolla  MRN: [id number]   YOB: 1957   Date: 8/9/2024   Age: 67 y.o.  Gender: female     COMPLAINT AND PRESENT HISTORY:   Theresa Bedolla is 67 y.o.,  female, presents for pre-anesthesia/admission testing for CYSTOSCOPY LITHOLAPAXY WITH HOLMIUM LASER, POSSIBLE BOTOX 100 UNITS per Dr. Randolph.  Primary dx: Bladder stone [N21.0]  Right ureteral stone [N20.1].    Office note per Dr Randolph on 7/23/2024  HPI  She is here for recurrent infections.   She has frequency and urgency.  She does not leak.   Myrbetriq and gemtesa did not help.   Her last urine culture was yeast.   Assessment & Plan  She has OAB.   CT showed some stones near the bladder neck.   They were not seen on initial cysto.   Will need cysto possible lithalopaxy and possible botox.     UPDATE 8/9/2024  Theresa Bedolla is 67 y.o.,  female, undergoing preadmission testing for right ureteral stone and bladder stone. Pt has a 3 mm stone near the bladder neck. Pt C/O of frequent urination with feeling like unable to empty bladder fully.  Pt reports occasionally there is a burning sensation with urination, weak stream that starts and stops.  Pt reports blood in urine at times.    Pt reports occasional nausea but denies vomiting or diaphoresis.  Symptoms started January 11, 2024.    Pt denies fever/chills.  Denies recent or current chest pain/pressure, palpitations, SOB, headaches, lower extremity edema.  Pt reports occasional SOBOE especially with stair.  Occasional dizziness     Review of additional significant medical hx:  Follows with Dr Griffith PCP  Last Visit 7/22/2024  Medical clearance requested per Endocrinology    HTN, HLD, Palpitations  Associated Medication Losartan, Crestor, ASA  Follows with PCP    Cerebral artery occlusion with cerebral infarct 4/20/2017, TIA 6/22/2024  Associated Medication: ASA, Plavix  Follows with PCP    DM2  Associated Medication Humulin,  No

## 2024-08-19 RX ORDER — SULFAMETHOXAZOLE AND TRIMETHOPRIM 800; 160 MG/1; MG/1
800-160 TABLET ORAL TWICE DAILY
Qty: 14 | Refills: 0 | Status: ACTIVE | COMMUNITY
Start: 2024-08-19 | End: 1900-01-01

## 2024-08-20 ENCOUNTER — NON-APPOINTMENT (OUTPATIENT)
Age: 79
End: 2024-08-20

## 2024-08-22 ENCOUNTER — NON-APPOINTMENT (OUTPATIENT)
Age: 79
End: 2024-08-22

## 2024-08-22 PROBLEM — Z86.79 PERSONAL HISTORY OF OTHER DISEASES OF THE CIRCULATORY SYSTEM: Chronic | Status: ACTIVE | Noted: 2024-08-13

## 2024-08-22 PROBLEM — N40.1 BENIGN PROSTATIC HYPERPLASIA WITH LOWER URINARY TRACT SYMPTOMS: Chronic | Status: ACTIVE | Noted: 2024-08-13

## 2024-08-23 ENCOUNTER — APPOINTMENT (OUTPATIENT)
Dept: UROLOGY | Facility: CLINIC | Age: 79
End: 2024-08-23

## 2024-08-23 ENCOUNTER — NON-APPOINTMENT (OUTPATIENT)
Age: 79
End: 2024-08-23

## 2024-08-30 ENCOUNTER — APPOINTMENT (OUTPATIENT)
Dept: UROLOGY | Facility: HOSPITAL | Age: 79
End: 2024-08-30

## 2024-09-25 ENCOUNTER — APPOINTMENT (OUTPATIENT)
Dept: UROLOGY | Facility: CLINIC | Age: 79
End: 2024-09-25
Payer: MEDICARE

## 2024-09-25 DIAGNOSIS — N40.1 BENIGN PROSTATIC HYPERPLASIA WITH LOWER URINARY TRACT SYMPMS: ICD-10-CM

## 2024-09-25 DIAGNOSIS — N13.8 BENIGN PROSTATIC HYPERPLASIA WITH LOWER URINARY TRACT SYMPMS: ICD-10-CM

## 2024-09-25 PROCEDURE — 51702 INSERT TEMP BLADDER CATH: CPT

## 2024-09-29 LAB — BACTERIA UR CULT: ABNORMAL

## 2024-10-10 ENCOUNTER — APPOINTMENT (OUTPATIENT)
Dept: CARDIOLOGY | Facility: CLINIC | Age: 79
End: 2024-10-10
Payer: MEDICARE

## 2024-10-10 ENCOUNTER — NON-APPOINTMENT (OUTPATIENT)
Age: 79
End: 2024-10-10

## 2024-10-10 VITALS
OXYGEN SATURATION: 99 % | BODY MASS INDEX: 30.8 KG/M2 | HEART RATE: 60 BPM | SYSTOLIC BLOOD PRESSURE: 174 MMHG | WEIGHT: 220 LBS | HEIGHT: 71 IN | DIASTOLIC BLOOD PRESSURE: 80 MMHG

## 2024-10-10 VITALS — BODY MASS INDEX: 30.8 KG/M2 | HEIGHT: 71 IN | WEIGHT: 220 LBS

## 2024-10-10 DIAGNOSIS — I10 ESSENTIAL (PRIMARY) HYPERTENSION: ICD-10-CM

## 2024-10-10 DIAGNOSIS — I50.42 CHRONIC COMBINED SYSTOLIC (CONGESTIVE) AND DIASTOLIC (CONGESTIVE) HEART FAILURE: ICD-10-CM

## 2024-10-10 DIAGNOSIS — E78.5 HYPERLIPIDEMIA, UNSPECIFIED: ICD-10-CM

## 2024-10-10 DIAGNOSIS — Z01.810 ENCOUNTER FOR PREPROCEDURAL CARDIOVASCULAR EXAMINATION: ICD-10-CM

## 2024-10-10 PROCEDURE — 99214 OFFICE O/P EST MOD 30 MIN: CPT | Mod: 25

## 2024-10-10 PROCEDURE — 93000 ELECTROCARDIOGRAM COMPLETE: CPT

## 2024-10-17 ENCOUNTER — APPOINTMENT (OUTPATIENT)
Dept: CARDIOLOGY | Facility: CLINIC | Age: 79
End: 2024-10-17

## 2024-10-21 ENCOUNTER — NON-APPOINTMENT (OUTPATIENT)
Age: 79
End: 2024-10-21

## 2024-10-22 NOTE — DIETITIAN INITIAL EVALUATION ADULT. - ENERGY INTAKE
I have personally seen and examined the patient. I have collaborated with and supervised the Good (>75%)

## 2024-11-07 ENCOUNTER — APPOINTMENT (OUTPATIENT)
Dept: UROLOGY | Facility: CLINIC | Age: 79
End: 2024-11-07
Payer: MEDICARE

## 2024-11-07 VITALS
DIASTOLIC BLOOD PRESSURE: 63 MMHG | HEART RATE: 59 BPM | HEIGHT: 71 IN | BODY MASS INDEX: 30.8 KG/M2 | OXYGEN SATURATION: 97 % | WEIGHT: 220 LBS | SYSTOLIC BLOOD PRESSURE: 188 MMHG | RESPIRATION RATE: 16 BRPM

## 2024-11-07 DIAGNOSIS — R33.9 RETENTION OF URINE, UNSPECIFIED: ICD-10-CM

## 2024-11-07 PROCEDURE — 51702 INSERT TEMP BLADDER CATH: CPT

## 2024-11-20 ENCOUNTER — OUTPATIENT (OUTPATIENT)
Dept: OUTPATIENT SERVICES | Facility: HOSPITAL | Age: 79
LOS: 1 days | End: 2024-11-20
Payer: MEDICARE

## 2024-11-20 VITALS
TEMPERATURE: 98 F | WEIGHT: 211.2 LBS | HEIGHT: 70.5 IN | RESPIRATION RATE: 18 BRPM | SYSTOLIC BLOOD PRESSURE: 180 MMHG | OXYGEN SATURATION: 97 % | HEART RATE: 61 BPM | DIASTOLIC BLOOD PRESSURE: 82 MMHG

## 2024-11-20 DIAGNOSIS — Z29.9 ENCOUNTER FOR PROPHYLACTIC MEASURES, UNSPECIFIED: ICD-10-CM

## 2024-11-20 DIAGNOSIS — Z01.818 ENCOUNTER FOR OTHER PREPROCEDURAL EXAMINATION: ICD-10-CM

## 2024-11-20 DIAGNOSIS — Z98.890 OTHER SPECIFIED POSTPROCEDURAL STATES: Chronic | ICD-10-CM

## 2024-11-20 DIAGNOSIS — Z90.49 ACQUIRED ABSENCE OF OTHER SPECIFIED PARTS OF DIGESTIVE TRACT: Chronic | ICD-10-CM

## 2024-11-20 DIAGNOSIS — I10 ESSENTIAL (PRIMARY) HYPERTENSION: ICD-10-CM

## 2024-11-20 DIAGNOSIS — I50.22 CHRONIC SYSTOLIC (CONGESTIVE) HEART FAILURE: ICD-10-CM

## 2024-11-20 DIAGNOSIS — N40.1 BENIGN PROSTATIC HYPERPLASIA WITH LOWER URINARY TRACT SYMPTOMS: ICD-10-CM

## 2024-11-20 LAB
A1C WITH ESTIMATED AVERAGE GLUCOSE RESULT: 4.9 % — SIGNIFICANT CHANGE UP (ref 4–5.6)
ALBUMIN SERPL ELPH-MCNC: 4.1 G/DL — SIGNIFICANT CHANGE UP (ref 3.3–5.2)
ALP SERPL-CCNC: 136 U/L — HIGH (ref 40–120)
ALT FLD-CCNC: 13 U/L — SIGNIFICANT CHANGE UP
ANION GAP SERPL CALC-SCNC: 12 MMOL/L — SIGNIFICANT CHANGE UP (ref 5–17)
APPEARANCE UR: CLEAR — SIGNIFICANT CHANGE UP
AST SERPL-CCNC: 21 U/L — SIGNIFICANT CHANGE UP
BACTERIA # UR AUTO: ABNORMAL /HPF
BASOPHILS # BLD AUTO: 0.06 K/UL — SIGNIFICANT CHANGE UP (ref 0–0.2)
BASOPHILS NFR BLD AUTO: 0.9 % — SIGNIFICANT CHANGE UP (ref 0–2)
BILIRUB SERPL-MCNC: 0.6 MG/DL — SIGNIFICANT CHANGE UP (ref 0.4–2)
BILIRUB UR-MCNC: NEGATIVE — SIGNIFICANT CHANGE UP
BUN SERPL-MCNC: 20.4 MG/DL — HIGH (ref 8–20)
CALCIUM SERPL-MCNC: 9 MG/DL — SIGNIFICANT CHANGE UP (ref 8.4–10.5)
CAST: 0 /LPF — SIGNIFICANT CHANGE UP (ref 0–4)
CHLORIDE SERPL-SCNC: 105 MMOL/L — SIGNIFICANT CHANGE UP (ref 96–108)
CO2 SERPL-SCNC: 27 MMOL/L — SIGNIFICANT CHANGE UP (ref 22–29)
COLOR SPEC: YELLOW — SIGNIFICANT CHANGE UP
CREAT SERPL-MCNC: 1.33 MG/DL — HIGH (ref 0.5–1.3)
DIFF PNL FLD: ABNORMAL
EGFR: 54 ML/MIN/1.73M2 — LOW
EOSINOPHIL # BLD AUTO: 0.55 K/UL — HIGH (ref 0–0.5)
EOSINOPHIL NFR BLD AUTO: 8.5 % — HIGH (ref 0–6)
ESTIMATED AVERAGE GLUCOSE: 94 MG/DL — SIGNIFICANT CHANGE UP (ref 68–114)
GLUCOSE SERPL-MCNC: 101 MG/DL — HIGH (ref 70–99)
GLUCOSE UR QL: NEGATIVE MG/DL — SIGNIFICANT CHANGE UP
HCT VFR BLD CALC: 44.7 % — SIGNIFICANT CHANGE UP (ref 39–50)
HGB BLD-MCNC: 15.1 G/DL — SIGNIFICANT CHANGE UP (ref 13–17)
IMM GRANULOCYTES NFR BLD AUTO: 0.8 % — SIGNIFICANT CHANGE UP (ref 0–0.9)
KETONES UR-MCNC: NEGATIVE MG/DL — SIGNIFICANT CHANGE UP
LEUKOCYTE ESTERASE UR-ACNC: ABNORMAL
LYMPHOCYTES # BLD AUTO: 1.44 K/UL — SIGNIFICANT CHANGE UP (ref 1–3.3)
LYMPHOCYTES # BLD AUTO: 22.3 % — SIGNIFICANT CHANGE UP (ref 13–44)
MCHC RBC-ENTMCNC: 30.6 PG — SIGNIFICANT CHANGE UP (ref 27–34)
MCHC RBC-ENTMCNC: 33.8 G/DL — SIGNIFICANT CHANGE UP (ref 32–36)
MCV RBC AUTO: 90.7 FL — SIGNIFICANT CHANGE UP (ref 80–100)
MONOCYTES # BLD AUTO: 0.55 K/UL — SIGNIFICANT CHANGE UP (ref 0–0.9)
MONOCYTES NFR BLD AUTO: 8.5 % — SIGNIFICANT CHANGE UP (ref 2–14)
NEUTROPHILS # BLD AUTO: 3.82 K/UL — SIGNIFICANT CHANGE UP (ref 1.8–7.4)
NEUTROPHILS NFR BLD AUTO: 59 % — SIGNIFICANT CHANGE UP (ref 43–77)
NITRITE UR-MCNC: POSITIVE
PH UR: 6 — SIGNIFICANT CHANGE UP (ref 5–8)
PLATELET # BLD AUTO: 231 K/UL — SIGNIFICANT CHANGE UP (ref 150–400)
POTASSIUM SERPL-MCNC: 4.7 MMOL/L — SIGNIFICANT CHANGE UP (ref 3.5–5.3)
POTASSIUM SERPL-SCNC: 4.7 MMOL/L — SIGNIFICANT CHANGE UP (ref 3.5–5.3)
PROT SERPL-MCNC: 7.3 G/DL — SIGNIFICANT CHANGE UP (ref 6.6–8.7)
PROT UR-MCNC: NEGATIVE MG/DL — SIGNIFICANT CHANGE UP
RBC # BLD: 4.93 M/UL — SIGNIFICANT CHANGE UP (ref 4.2–5.8)
RBC # FLD: 13.2 % — SIGNIFICANT CHANGE UP (ref 10.3–14.5)
RBC CASTS # UR COMP ASSIST: 1 /HPF — SIGNIFICANT CHANGE UP (ref 0–4)
SODIUM SERPL-SCNC: 143 MMOL/L — SIGNIFICANT CHANGE UP (ref 135–145)
SP GR SPEC: 1.01 — SIGNIFICANT CHANGE UP (ref 1–1.03)
SQUAMOUS # UR AUTO: 0 /HPF — SIGNIFICANT CHANGE UP (ref 0–5)
UROBILINOGEN FLD QL: 0.2 MG/DL — SIGNIFICANT CHANGE UP (ref 0.2–1)
WBC # BLD: 6.47 K/UL — SIGNIFICANT CHANGE UP (ref 3.8–10.5)
WBC # FLD AUTO: 6.47 K/UL — SIGNIFICANT CHANGE UP (ref 3.8–10.5)
WBC UR QL: 10 /HPF — HIGH (ref 0–5)

## 2024-11-20 PROCEDURE — 87086 URINE CULTURE/COLONY COUNT: CPT

## 2024-11-20 PROCEDURE — 81001 URINALYSIS AUTO W/SCOPE: CPT

## 2024-11-20 PROCEDURE — 87077 CULTURE AEROBIC IDENTIFY: CPT

## 2024-11-20 PROCEDURE — 36415 COLL VENOUS BLD VENIPUNCTURE: CPT

## 2024-11-20 PROCEDURE — 87186 SC STD MICRODIL/AGAR DIL: CPT

## 2024-11-20 PROCEDURE — G0463: CPT

## 2024-11-20 PROCEDURE — 83036 HEMOGLOBIN GLYCOSYLATED A1C: CPT

## 2024-11-20 PROCEDURE — 80053 COMPREHEN METABOLIC PANEL: CPT

## 2024-11-20 PROCEDURE — 85025 COMPLETE CBC W/AUTO DIFF WBC: CPT

## 2024-11-20 RX ORDER — CEFAZOLIN SODIUM 10 G
2000 VIAL (EA) INJECTION ONCE
Refills: 0 | Status: DISCONTINUED | OUTPATIENT
Start: 2024-12-06 | End: 2024-12-06

## 2024-11-20 RX ORDER — SODIUM CHLORIDE 9 MG/ML
3 INJECTION, SOLUTION INTRAMUSCULAR; INTRAVENOUS; SUBCUTANEOUS ONCE
Refills: 0 | Status: DISCONTINUED | OUTPATIENT
Start: 2024-12-06 | End: 2024-12-06

## 2024-11-20 RX ORDER — ACETAMINOPHEN 500MG 500 MG/1
975 TABLET, COATED ORAL ONCE
Refills: 0 | Status: COMPLETED | OUTPATIENT
Start: 2024-12-06 | End: 2024-12-06

## 2024-11-20 NOTE — H&P PST ADULT - PROBLEM SELECTOR PLAN 1
preop assessment, 12 lead EKG ordered and reviewed, cardiac clearance pending preop assessment, BP elevated, patient states he has Hx of chronically elevated BP, takes his heart/BP meds as prescribed, 12 lead EKG reviewed, will see cardiologist on 11/21/2024 for echo and preop cardiac clearance preop assessment, systolic BP elevated, patient states he has Hx of chronically elevated systolic BP, states he takes his heart/BP meds as prescribed, 12 lead EKG reviewed, will see cardiologist on 11/21/2024 for echo and preop cardiac clearance

## 2024-11-20 NOTE — H&P PST ADULT - LAST ECHOCARDIOGRAM
Echo 11/2022: LVEF 20-25%, global hypokinesis; echo 3/2023: global hypokinesis, LVEF 45-50%, moderate AI

## 2024-11-20 NOTE — H&P PST ADULT - COMMENTS
BP rechecked: 186/82, patient stated he took his heart and BP meds today, reports Hx of elevated BP, will see cardiologist on 11/21/2024

## 2024-11-20 NOTE — H&P PST ADULT - OTHER CARE PROVIDERS
PCP Dr Trujillo PCP Dr Trujillo, cardiology NP Yoel ([facility]) PCP Dr Trujillo, cardiology Dr Layton Avilez

## 2024-11-20 NOTE — H&P PST ADULT - PROBLEM SELECTOR PLAN 4
preop assessment, scheduled for laser enucleation of prostate with morcellation w/Dr Tiwari on 12/6/2024, pending medical and cardiac clearances

## 2024-11-20 NOTE — H&P PST ADULT - NSICDXPASTMEDICALHX_GEN_ALL_CORE_FT
PAST MEDICAL HISTORY:  Cancer of kidney     Chronic HFrEF (heart failure with reduced ejection fraction)     Colon cancer s/p resection    Enlarged prostate with lower urinary tract symptoms (LUTS)     H/O heart failure     HTN (hypertension)     Neuropathy     Pericardial effusion s/p drain     PAST MEDICAL HISTORY:  Cancer of kidney     Chronic HFrEF (heart failure with reduced ejection fraction)     Colon cancer s/p resection    Enlarged prostate with lower urinary tract symptoms (LUTS)     H/O heart failure     HTN (hypertension)     Neuropathy     Pericardial effusion s/p drain    Renal insufficiency

## 2024-11-20 NOTE — H&P PST ADULT - PROBLEM SELECTOR PLAN 2
preop assessment, 12 lead EKG ordered and reviewed, cardiac clearance pending preop assessment, 12 lead EKG reviewed, cardiac clearance pending

## 2024-11-20 NOTE — H&P PST ADULT - NEGATIVE GENERAL GENITOURINARY SYMPTOMS
no hematuria/no flank pain L/no flank pain R/no bladder infections no hematuria/no flank pain L/no flank pain R/no dysuria

## 2024-11-20 NOTE — H&P PST ADULT - EKG AND INTERPRETATION
10/10/2024: sinus bradycardia, HR 54, nonspecific ST depression and T wave abnormality, cardiac clearance pending

## 2024-11-20 NOTE — H&P PST ADULT - HISTORY OF PRESENT ILLNESS
77 yo M Tamaz[place] speaking PMH of HTN, HLD, colon CA s/p resection and chemotherapy 3 years ago, kidney CA, pericardial effusion requiring hospitalization and drainage, HFrEF 20-25%, BPH, had baum placed in May 2024 for retention, presents for preop assessment. Patient presented to [facility] ED in January 2024 with hematuria, admitted with obstructive uropathy, s/p cystoscopy on 1/12/2024. Today patient denies fever, chills, back pain, chest pain, SOB. Scheduled for laser enucleation of prostate with morcellation w/Dr Tiwari on 12/6/2024, pending medical and cardiac clearances        s/p laser enucleation of the prostate with morcellation on 8/30/24 w/Dr Tiwari   77 yo M Croatian speaking PMH of HTN, HLD, colon CA s/p resection and chemotherapy 3 years ago, kidney CA, pericardial effusion requiring hospitalization and drainage, HFrEF 20-25%, BPH, had baum placed in May 2024 for retention, presents for preop assessment. Patient presented to [facility] ED in January 2024 with hematuria, admitted with obstructive uropathy, s/p cystoscopy on 1/12/2024. Today patient denies fever, chills, back pain, chest pain, SOB. Scheduled for laser enucleation of prostate with morcellation w/Dr Tiwari on 12/6/2024, pending medical and cardiac clearances        s/p laser enucleation of the prostate with morcellation on 8/30/2024 79 yo M Yoruba speaking PMH of HTN, HLD, colon CA s/p resection and chemotherapy 3 years ago, kidney CA, pericardial effusion s/p pericardial window 12/17/2021, HFrEF, BPH, presents for preop assessment with c/o enlarged prostate. Patient initially presented to [facility] ED in January 2024 with hematuria, admitted with obstructive uropathy, s/p cystoscopy on 1/12/2024. Had baum placed in May 2024 for retention, last baum change about 2 weeks ago. Patient is s/p laser enucleation of the prostate with morcellation on 8/30/2024, states he is still unable to urinate on his own. Today patient denies recent fevers, chills, back pain, chest pain, SOB, dysuria or hematuria. Scheduled for laser enucleation of prostate with morcellation w/Dr Tiwari on 12/6/2024, pending medical and cardiac clearances         79 yo Norwegian speaking M with PMH of HTN (poorly controlled), HLD, colon CA s/p resection and chemotherapy 3 years ago, kidney CA, pericardial effusion s/p pericardial window 12/17/2021, HFrEF (echo 11/2022: LVEF 20-25%, global hypokinesis; echo 3/2023: global hypokinesis, LVEF 45-50%, moderate AI), and BPH, presents for preop assessment. Patient initially presented to [facility] ED in January 2024 with hematuria, admitted with obstructive uropathy, s/p cystoscopy on 1/12/2024. Had baum placed in May 2024 for retention, last baum change about 2 weeks ago. Patient is s/p laser enucleation of the prostate with morcellation on 8/30/2024, states he is still unable to urinate on his own. Today he denies recent fevers, chills, back pain, chest pain, SOB, dysuria or hematuria. Scheduled for laser enucleation of prostate with morcellation w/Dr Tiwari on 12/6/2024, pending medical and cardiac clearances

## 2024-11-20 NOTE — H&P PST ADULT - ASSESSMENT
79 yo M Kyrgyz speaking PMH of HTN, HLD, colon CA s/p resection and chemotherapy 3 years ago, kidney CA, pericardial effusion requiring hospitalization and drainage, HFrEF 20-25%, BPH, baum placed in May 2024 for retention, presents for preop assessment. Patient presented to [facility] ED in 2024 with hematuria, admitted with obstructive uropathy, s/p cystoscopy on 2024. Today patient denies fever, chills, back pain, chest pain, SOB. Scheduled for laser enucleation of prostate with morcellation w/Dr Tiwari on 2024, pending medical and cardiac clearances    Patient was educated on preop preparation with written and verbal instructions. Pt was informed to obtain clearances >3 days before surgery. Pt was instructed to ask his prescribing provider for preop directions on aspirin. Pt was educated on NSAIDs, multivitamins and herbals that increase the risk of bleeding and need to be stopped 7 days before procedure. Pt verbalized understanding of the above.     OPIOID RISK TOOL    XIMENA EACH BOX THAT APPLIES AND ADD TOTALS AT THE END    FAMILY HISTORY OF SUBSTANCE ABUSE                 FEMALE         MALE                                                Alcohol                             [  ]1 pt          [  ]3pts                                               Illegal Drugs                     [  ]2 pts        [  ]3pts                                               Rx Drugs                           [  ]4 pts        [  ]4 pts    PERSONAL HISTORY OF SUBSTANCE ABUSE                                                                                          Alcohol                             [  ]3 pts       [  ]3 pts                                               Illegal Drugs                     [  ]4 pts        [  ]4 pts                                               Rx Drugs                           [  ]5 pts        [  ]5 pts    AGE BETWEEN 16-45 YEARS                                      [  ]1 pt         [  ]1 pt    HISTORY OF PREADOLESCENT   SEXUAL ABUSE                                                             [  ]3 pts        [  ]0pts    PSYCHOLOGICAL DISEASE                     ADD, OCD, Bipolar, Schizophrenia        [  ]2 pts         [  ]2 pts                      Depression                                               [  ]1 pt           [  ]1 pt           SCORING TOTAL   (add numbers and type here)              ( 0 )                                     A score of 3 or lower indicated LOW risk for future opioid abuse  A score of 4 to 7 indicated moderate risk for future opioid abuse  A score of 8 or higher indicates a high risk for opioid abuse    CAPRINI VTE 2.0 SCORE [CLOT updated 2019]    AGE RELATED RISK FACTORS                                                       MOBILITY RELATED FACTORS  [ ] Age 41-60 years                                            (1 Point)                    [ ] Bed rest                                                        (1 Point)  [ ] Age: 61-74 years                                           (2 Points)                  [ ] Plaster cast                                                   (2 Points)  [x ] Age= 75 years                                              (3 Points)                    [ ] Bed bound for more than 72 hours                 (2 Points)    DISEASE RELATED RISK FACTORS                                               GENDER SPECIFIC FACTORS  [ ] Edema in the lower extremities                       (1 Point)              [ ] Pregnancy                                                     (1 Point)  [ ] Varicose veins                                               (1 Point)                     [ ] Post-partum < 6 weeks                                   (1 Point)             [x ] BMI > 25 Kg/m2                                            (1 Point)                     [ ] Hormonal therapy  or oral contraception          (1 Point)                 [ ] Sepsis (in the previous month)                        (1 Point)               [ ] History of pregnancy complications                 (1 point)  [ ] Pneumonia or serious lung disease                                               [ ] Unexplained or recurrent                     (1 Point)           (in the previous month)                               (1 Point)  [ ] Abnormal pulmonary function test                     (1 Point)                 SURGERY RELATED RISK FACTORS  [ ] Acute myocardial infarction                              (1 Point)               [ ]  Section                                             (1 Point)  [ ] Congestive heart failure (in the previous month)  (1 Point)      [ ] Minor surgery                                                  (1 Point)   [ ] Inflammatory bowel disease                             (1 Point)               [ ] Arthroscopic surgery                                        (2 Points)  [ ] Central venous access                                      (2 Points)                [ x] General surgery lasting more than 45 minutes (2 points)  [ x] Malignancy- Present or previous                   (2 Points)                [ ] Elective arthroplasty                                         (5 points)    [ ] Stroke (in the previous month)                          (5 Points)                                                                                                                                                           HEMATOLOGY RELATED FACTORS                                                 TRAUMA RELATED RISK FACTORS  [ ] Prior episodes of VTE                                     (3 Points)                [ ] Fracture of the hip, pelvis, or leg                       (5 Points)  [ ] Positive family history for VTE                         (3 Points)             [ ] Acute spinal cord injury (in the previous month)  (5 Points)  [ ] Prothrombin 32469 A                                     (3 Points)               [ ] Paralysis  (less than 1 month)                             (5 Points)  [ ] Factor V Leiden                                             (3 Points)                  [ ] Multiple Trauma within 1 month                        (5 Points)  [ ] Lupus anticoagulants                                     (3 Points)                                                           [ ] Anticardiolipin antibodies                               (3 Points)                                                       [ ] High homocysteine in the blood                      (3 Points)                                             [ ] Other congenital or acquired thrombophilia      (3 Points)                                                [ ] Heparin induced thrombocytopenia                  (3 Points)                                     Total Score [  8        ] 79 yo M Syriac speaking PMH of HTN, HLD, colon CA s/p resection and chemotherapy 3 years ago, kidney CA, pericardial effusion s/p pericardial window 2021, HFrEF, BPH, presents for preop assessment with c/o enlarged prostate. Patient initially presented to [facility] ED in 2024 with hematuria, admitted with obstructive uropathy, s/p cystoscopy on 2024. Had baum placed in May 2024 for retention, last baum change about 2 weeks ago. Patient is s/p laser enucleation of the prostate with morcellation on 2024, states he is still unable to urinate on his own. Today patient denies recent fevers, chills, back pain, chest pain, SOB, dysuria or hematuria. Scheduled for laser enucleation of prostate with morcellation w/Dr Tiwari on 2024, pending medical and cardiac clearances    Patient was educated on preop preparation with written and verbal instructions. Pt was informed to obtain clearances >3 days before surgery. Pt was instructed to ask his prescribing provider for preop directions on aspirin. Pt was educated on NSAIDs, multivitamins and herbals that increase the risk of bleeding and need to be stopped 7 days before procedure. Pt verbalized understanding of the above.     OPIOID RISK TOOL    XIMENA EACH BOX THAT APPLIES AND ADD TOTALS AT THE END    FAMILY HISTORY OF SUBSTANCE ABUSE                 FEMALE         MALE                                                Alcohol                             [  ]1 pt          [  ]3pts                                               Illegal Drugs                     [  ]2 pts        [  ]3pts                                               Rx Drugs                           [  ]4 pts        [  ]4 pts    PERSONAL HISTORY OF SUBSTANCE ABUSE                                                                                          Alcohol                             [  ]3 pts       [  ]3 pts                                               Illegal Drugs                     [  ]4 pts        [  ]4 pts                                               Rx Drugs                           [  ]5 pts        [  ]5 pts    AGE BETWEEN 16-45 YEARS                                      [  ]1 pt         [  ]1 pt    HISTORY OF PREADOLESCENT   SEXUAL ABUSE                                                             [  ]3 pts        [  ]0pts    PSYCHOLOGICAL DISEASE                     ADD, OCD, Bipolar, Schizophrenia        [  ]2 pts         [  ]2 pts                      Depression                                               [  ]1 pt           [  ]1 pt           SCORING TOTAL   (add numbers and type here)              ( 0 )                                     A score of 3 or lower indicated LOW risk for future opioid abuse  A score of 4 to 7 indicated moderate risk for future opioid abuse  A score of 8 or higher indicates a high risk for opioid abuse    CAPRINI VTE 2.0 SCORE [CLOT updated 2019]    AGE RELATED RISK FACTORS                                                       MOBILITY RELATED FACTORS  [ ] Age 41-60 years                                            (1 Point)                    [ ] Bed rest                                                        (1 Point)  [ ] Age: 61-74 years                                           (2 Points)                  [ ] Plaster cast                                                   (2 Points)  [x ] Age= 75 years                                              (3 Points)                    [ ] Bed bound for more than 72 hours                 (2 Points)    DISEASE RELATED RISK FACTORS                                               GENDER SPECIFIC FACTORS  [ ] Edema in the lower extremities                       (1 Point)              [ ] Pregnancy                                                     (1 Point)  [ ] Varicose veins                                               (1 Point)                     [ ] Post-partum < 6 weeks                                   (1 Point)             [x ] BMI > 25 Kg/m2                                            (1 Point)                     [ ] Hormonal therapy  or oral contraception          (1 Point)                 [ ] Sepsis (in the previous month)                        (1 Point)               [ ] History of pregnancy complications                 (1 point)  [ ] Pneumonia or serious lung disease                                               [ ] Unexplained or recurrent                     (1 Point)           (in the previous month)                               (1 Point)  [ ] Abnormal pulmonary function test                     (1 Point)                 SURGERY RELATED RISK FACTORS  [ ] Acute myocardial infarction                              (1 Point)               [ ]  Section                                             (1 Point)  [ ] Congestive heart failure (in the previous month)  (1 Point)      [ ] Minor surgery                                                  (1 Point)   [ ] Inflammatory bowel disease                             (1 Point)               [ ] Arthroscopic surgery                                        (2 Points)  [ ] Central venous access                                      (2 Points)                [ x] General surgery lasting more than 45 minutes (2 points)  [ x] Malignancy- Present or previous                   (2 Points)                [ ] Elective arthroplasty                                         (5 points)    [ ] Stroke (in the previous month)                          (5 Points)                                                                                                                                                           HEMATOLOGY RELATED FACTORS                                                 TRAUMA RELATED RISK FACTORS  [ ] Prior episodes of VTE                                     (3 Points)                [ ] Fracture of the hip, pelvis, or leg                       (5 Points)  [ ] Positive family history for VTE                         (3 Points)             [ ] Acute spinal cord injury (in the previous month)  (5 Points)  [ ] Prothrombin 66835 A                                     (3 Points)               [ ] Paralysis  (less than 1 month)                             (5 Points)  [ ] Factor V Leiden                                             (3 Points)                  [ ] Multiple Trauma within 1 month                        (5 Points)  [ ] Lupus anticoagulants                                     (3 Points)                                                           [ ] Anticardiolipin antibodies                               (3 Points)                                                       [ ] High homocysteine in the blood                      (3 Points)                                             [ ] Other congenital or acquired thrombophilia      (3 Points)                                                [ ] Heparin induced thrombocytopenia                  (3 Points)                                     Total Score [  8        ] 77 yo Nepalese speaking M with PMH of HTN (poorly controlled), HLD, colon CA s/p resection and chemotherapy 3 years ago, kidney CA, pericardial effusion s/p pericardial window 2021, HFrEF (echo 2022: LVEF 20-25%, global hypokinesis; echo 3/2023: global hypokinesis, LVEF 45-50%, moderate AI), and BPH, presents for preop assessment. Patient initially presented to [facility] ED in 2024 with hematuria, admitted with obstructive uropathy, s/p cystoscopy on 2024. Had baum placed in May 2024 for retention, last baum change about 2 weeks ago. Patient is s/p laser enucleation of the prostate with morcellation on 2024, states he is still unable to urinate on his own. Today he denies recent fevers, chills, back pain, chest pain, SOB, dysuria or hematuria. Scheduled for laser enucleation of prostate with morcellation w/Dr Tiwari on 2024, pending medical and cardiac clearances    Patient was educated on preop preparation with written and verbal instructions. Pt was informed to obtain clearances >3 days before surgery. Pt was instructed to ask his prescribing provider for preop directions on aspirin. Pt was educated on NSAIDs, multivitamins and herbals that increase the risk of bleeding and need to be stopped 7 days before procedure. Pt verbalized understanding of the above.     OPIOID RISK TOOL    XIMENA EACH BOX THAT APPLIES AND ADD TOTALS AT THE END    FAMILY HISTORY OF SUBSTANCE ABUSE                 FEMALE         MALE                                                Alcohol                             [  ]1 pt          [  ]3pts                                               Illegal Drugs                     [  ]2 pts        [  ]3pts                                               Rx Drugs                           [  ]4 pts        [  ]4 pts    PERSONAL HISTORY OF SUBSTANCE ABUSE                                                                                          Alcohol                             [  ]3 pts       [  ]3 pts                                               Illegal Drugs                     [  ]4 pts        [  ]4 pts                                               Rx Drugs                           [  ]5 pts        [  ]5 pts    AGE BETWEEN 16-45 YEARS                                      [  ]1 pt         [  ]1 pt    HISTORY OF PREADOLESCENT   SEXUAL ABUSE                                                             [  ]3 pts        [  ]0pts    PSYCHOLOGICAL DISEASE                     ADD, OCD, Bipolar, Schizophrenia        [  ]2 pts         [  ]2 pts                      Depression                                               [  ]1 pt           [  ]1 pt           SCORING TOTAL   (add numbers and type here)              ( 0 )                                     A score of 3 or lower indicated LOW risk for future opioid abuse  A score of 4 to 7 indicated moderate risk for future opioid abuse  A score of 8 or higher indicates a high risk for opioid abuse    CAPRINI VTE 2.0 SCORE [CLOT updated 2019]    AGE RELATED RISK FACTORS                                                       MOBILITY RELATED FACTORS  [ ] Age 41-60 years                                            (1 Point)                    [ ] Bed rest                                                        (1 Point)  [ ] Age: 61-74 years                                           (2 Points)                  [ ] Plaster cast                                                   (2 Points)  [x ] Age= 75 years                                              (3 Points)                    [ ] Bed bound for more than 72 hours                 (2 Points)    DISEASE RELATED RISK FACTORS                                               GENDER SPECIFIC FACTORS  [ ] Edema in the lower extremities                       (1 Point)              [ ] Pregnancy                                                     (1 Point)  [ ] Varicose veins                                               (1 Point)                     [ ] Post-partum < 6 weeks                                   (1 Point)             [x ] BMI > 25 Kg/m2                                            (1 Point)                     [ ] Hormonal therapy  or oral contraception          (1 Point)                 [ ] Sepsis (in the previous month)                        (1 Point)               [ ] History of pregnancy complications                 (1 point)  [ ] Pneumonia or serious lung disease                                               [ ] Unexplained or recurrent                     (1 Point)           (in the previous month)                               (1 Point)  [ ] Abnormal pulmonary function test                     (1 Point)                 SURGERY RELATED RISK FACTORS  [ ] Acute myocardial infarction                              (1 Point)               [ ]  Section                                             (1 Point)  [ ] Congestive heart failure (in the previous month)  (1 Point)      [ ] Minor surgery                                                  (1 Point)   [ ] Inflammatory bowel disease                             (1 Point)               [ ] Arthroscopic surgery                                        (2 Points)  [ ] Central venous access                                      (2 Points)                [ x] General surgery lasting more than 45 minutes (2 points)  [ x] Malignancy- Present or previous                   (2 Points)                [ ] Elective arthroplasty                                         (5 points)    [ ] Stroke (in the previous month)                          (5 Points)                                                                                                                                                           HEMATOLOGY RELATED FACTORS                                                 TRAUMA RELATED RISK FACTORS  [ ] Prior episodes of VTE                                     (3 Points)                [ ] Fracture of the hip, pelvis, or leg                       (5 Points)  [ ] Positive family history for VTE                         (3 Points)             [ ] Acute spinal cord injury (in the previous month)  (5 Points)  [ ] Prothrombin 63163 A                                     (3 Points)               [ ] Paralysis  (less than 1 month)                             (5 Points)  [ ] Factor V Leiden                                             (3 Points)                  [ ] Multiple Trauma within 1 month                        (5 Points)  [ ] Lupus anticoagulants                                     (3 Points)                                                           [ ] Anticardiolipin antibodies                               (3 Points)                                                       [ ] High homocysteine in the blood                      (3 Points)                                             [ ] Other congenital or acquired thrombophilia      (3 Points)                                                [ ] Heparin induced thrombocytopenia                  (3 Points)                                     Total Score [  8        ]

## 2024-11-21 ENCOUNTER — APPOINTMENT (OUTPATIENT)
Dept: CARDIOLOGY | Facility: CLINIC | Age: 79
End: 2024-11-21
Payer: MEDICARE

## 2024-11-21 VITALS
HEART RATE: 60 BPM | SYSTOLIC BLOOD PRESSURE: 210 MMHG | DIASTOLIC BLOOD PRESSURE: 78 MMHG | HEIGHT: 71 IN | BODY MASS INDEX: 29.96 KG/M2 | OXYGEN SATURATION: 97 % | WEIGHT: 214 LBS

## 2024-11-21 VITALS — SYSTOLIC BLOOD PRESSURE: 192 MMHG | DIASTOLIC BLOOD PRESSURE: 72 MMHG

## 2024-11-21 DIAGNOSIS — N13.8 BENIGN PROSTATIC HYPERPLASIA WITH LOWER URINARY TRACT SYMPMS: ICD-10-CM

## 2024-11-21 DIAGNOSIS — I50.9 HEART FAILURE, UNSPECIFIED: ICD-10-CM

## 2024-11-21 DIAGNOSIS — I10 ESSENTIAL (PRIMARY) HYPERTENSION: ICD-10-CM

## 2024-11-21 DIAGNOSIS — I21.4 NON-ST ELEVATION (NSTEMI) MYOCARDIAL INFARCTION: ICD-10-CM

## 2024-11-21 DIAGNOSIS — N40.1 BENIGN PROSTATIC HYPERPLASIA WITH LOWER URINARY TRACT SYMPMS: ICD-10-CM

## 2024-11-21 DIAGNOSIS — R06.02 SHORTNESS OF BREATH: ICD-10-CM

## 2024-11-21 PROCEDURE — G2211 COMPLEX E/M VISIT ADD ON: CPT

## 2024-11-21 PROCEDURE — 99214 OFFICE O/P EST MOD 30 MIN: CPT

## 2024-11-21 PROCEDURE — 93000 ELECTROCARDIOGRAM COMPLETE: CPT

## 2024-11-21 RX ORDER — HYDRALAZINE HYDROCHLORIDE 50 MG/1
50 TABLET ORAL 3 TIMES DAILY
Qty: 270 | Refills: 0 | Status: ACTIVE | COMMUNITY
Start: 2024-11-21 | End: 1900-01-01

## 2024-11-23 LAB
-  AMOXICILLIN/CLAVULANIC ACID: SIGNIFICANT CHANGE UP
-  AMPICILLIN/SULBACTAM: SIGNIFICANT CHANGE UP
-  AMPICILLIN: SIGNIFICANT CHANGE UP
-  AZTREONAM: SIGNIFICANT CHANGE UP
-  CEFAZOLIN: SIGNIFICANT CHANGE UP
-  CEFEPIME: SIGNIFICANT CHANGE UP
-  CEFOXITIN: SIGNIFICANT CHANGE UP
-  CEFTRIAXONE: SIGNIFICANT CHANGE UP
-  CIPROFLOXACIN: SIGNIFICANT CHANGE UP
-  ERTAPENEM: SIGNIFICANT CHANGE UP
-  GENTAMICIN: SIGNIFICANT CHANGE UP
-  LEVOFLOXACIN: SIGNIFICANT CHANGE UP
-  MEROPENEM: SIGNIFICANT CHANGE UP
-  NITROFURANTOIN: SIGNIFICANT CHANGE UP
-  PIPERACILLIN/TAZOBACTAM: SIGNIFICANT CHANGE UP
-  TOBRAMYCIN: SIGNIFICANT CHANGE UP
-  TRIMETHOPRIM/SULFAMETHOXAZOLE: SIGNIFICANT CHANGE UP
CULTURE RESULTS: ABNORMAL
METHOD TYPE: SIGNIFICANT CHANGE UP
ORGANISM # SPEC MICROSCOPIC CNT: ABNORMAL
ORGANISM # SPEC MICROSCOPIC CNT: SIGNIFICANT CHANGE UP
SPECIMEN SOURCE: SIGNIFICANT CHANGE UP

## 2024-11-26 PROBLEM — I50.22 CHRONIC SYSTOLIC (CONGESTIVE) HEART FAILURE: Chronic | Status: ACTIVE | Noted: 2024-11-20

## 2024-11-26 PROBLEM — N28.9 DISORDER OF KIDNEY AND URETER, UNSPECIFIED: Chronic | Status: ACTIVE | Noted: 2024-11-20

## 2024-11-26 RX ORDER — SULFAMETHOXAZOLE AND TRIMETHOPRIM 800; 160 MG/1; MG/1
800-160 TABLET ORAL TWICE DAILY
Qty: 14 | Refills: 0 | Status: ACTIVE | COMMUNITY
Start: 2024-11-26 | End: 1900-01-01

## 2024-12-04 ENCOUNTER — APPOINTMENT (OUTPATIENT)
Dept: CARDIOLOGY | Facility: CLINIC | Age: 79
End: 2024-12-04
Payer: MEDICARE

## 2024-12-04 PROCEDURE — 93306 TTE W/DOPPLER COMPLETE: CPT

## 2024-12-05 ENCOUNTER — NON-APPOINTMENT (OUTPATIENT)
Age: 79
End: 2024-12-05

## 2024-12-05 DIAGNOSIS — R06.02 SHORTNESS OF BREATH: ICD-10-CM

## 2024-12-05 DIAGNOSIS — I50.9 HEART FAILURE, UNSPECIFIED: ICD-10-CM

## 2024-12-05 DIAGNOSIS — I31.39 OTHER PERICARDIAL EFFUSION (NONINFLAMMATORY): ICD-10-CM

## 2024-12-05 DIAGNOSIS — I51.9 HEART DISEASE, UNSPECIFIED: ICD-10-CM

## 2024-12-05 DIAGNOSIS — I50.42 CHRONIC COMBINED SYSTOLIC (CONGESTIVE) AND DIASTOLIC (CONGESTIVE) HEART FAILURE: ICD-10-CM

## 2024-12-05 DIAGNOSIS — I10 ESSENTIAL (PRIMARY) HYPERTENSION: ICD-10-CM

## 2024-12-05 DIAGNOSIS — I50.40 UNSPECIFIED COMBINED SYSTOLIC (CONGESTIVE) AND DIASTOLIC (CONGESTIVE) HEART FAILURE: ICD-10-CM

## 2024-12-05 DIAGNOSIS — I21.4 NON-ST ELEVATION (NSTEMI) MYOCARDIAL INFARCTION: ICD-10-CM

## 2024-12-06 ENCOUNTER — APPOINTMENT (OUTPATIENT)
Dept: UROLOGY | Facility: HOSPITAL | Age: 79
End: 2024-12-06

## 2024-12-06 ENCOUNTER — RESULT REVIEW (OUTPATIENT)
Age: 79
End: 2024-12-06

## 2024-12-06 ENCOUNTER — TRANSCRIPTION ENCOUNTER (OUTPATIENT)
Age: 79
End: 2024-12-06

## 2024-12-06 ENCOUNTER — INPATIENT (INPATIENT)
Facility: HOSPITAL | Age: 79
LOS: 1 days | Discharge: HOME | DRG: 726 | End: 2024-12-08
Attending: STUDENT IN AN ORGANIZED HEALTH CARE EDUCATION/TRAINING PROGRAM | Admitting: STUDENT IN AN ORGANIZED HEALTH CARE EDUCATION/TRAINING PROGRAM
Payer: MEDICARE

## 2024-12-06 VITALS
DIASTOLIC BLOOD PRESSURE: 67 MMHG | OXYGEN SATURATION: 97 % | SYSTOLIC BLOOD PRESSURE: 195 MMHG | HEART RATE: 57 BPM | TEMPERATURE: 98 F | RESPIRATION RATE: 16 BRPM | WEIGHT: 209.44 LBS

## 2024-12-06 DIAGNOSIS — Z90.49 ACQUIRED ABSENCE OF OTHER SPECIFIED PARTS OF DIGESTIVE TRACT: Chronic | ICD-10-CM

## 2024-12-06 DIAGNOSIS — Z98.890 OTHER SPECIFIED POSTPROCEDURAL STATES: Chronic | ICD-10-CM

## 2024-12-06 DIAGNOSIS — N40.1 BENIGN PROSTATIC HYPERPLASIA WITH LOWER URINARY TRACT SYMPTOMS: ICD-10-CM

## 2024-12-06 LAB
ACETONE SERPL-MCNC: NEGATIVE — SIGNIFICANT CHANGE UP
ANION GAP SERPL CALC-SCNC: 8 MMOL/L — SIGNIFICANT CHANGE UP (ref 5–17)
BUN SERPL-MCNC: 17.3 MG/DL — SIGNIFICANT CHANGE UP (ref 8–20)
CALCIUM SERPL-MCNC: 6.7 MG/DL — LOW (ref 8.4–10.5)
CHLORIDE SERPL-SCNC: 113 MMOL/L — HIGH (ref 96–108)
CO2 SERPL-SCNC: 20 MMOL/L — LOW (ref 22–29)
CREAT SERPL-MCNC: 1.34 MG/DL — HIGH (ref 0.5–1.3)
EGFR: 54 ML/MIN/1.73M2 — LOW
GLUCOSE BLDC GLUCOMTR-MCNC: 109 MG/DL — HIGH (ref 70–99)
GLUCOSE BLDC GLUCOMTR-MCNC: 90 MG/DL — SIGNIFICANT CHANGE UP (ref 70–99)
GLUCOSE BLDC GLUCOMTR-MCNC: 96 MG/DL — SIGNIFICANT CHANGE UP (ref 70–99)
GLUCOSE SERPL-MCNC: 121 MG/DL — HIGH (ref 70–99)
HCT VFR BLD CALC: 35.8 % — LOW (ref 39–50)
HGB BLD-MCNC: 12 G/DL — LOW (ref 13–17)
MCHC RBC-ENTMCNC: 30.4 PG — SIGNIFICANT CHANGE UP (ref 27–34)
MCHC RBC-ENTMCNC: 33.5 G/DL — SIGNIFICANT CHANGE UP (ref 32–36)
MCV RBC AUTO: 90.6 FL — SIGNIFICANT CHANGE UP (ref 80–100)
PLATELET # BLD AUTO: 181 K/UL — SIGNIFICANT CHANGE UP (ref 150–400)
POTASSIUM SERPL-MCNC: 4.6 MMOL/L — SIGNIFICANT CHANGE UP (ref 3.5–5.3)
POTASSIUM SERPL-SCNC: 4.6 MMOL/L — SIGNIFICANT CHANGE UP (ref 3.5–5.3)
RBC # BLD: 3.95 M/UL — LOW (ref 4.2–5.8)
RBC # FLD: 12.4 % — SIGNIFICANT CHANGE UP (ref 10.3–14.5)
SODIUM SERPL-SCNC: 141 MMOL/L — SIGNIFICANT CHANGE UP (ref 135–145)
WBC # BLD: 7 K/UL — SIGNIFICANT CHANGE UP (ref 3.8–10.5)
WBC # FLD AUTO: 7 K/UL — SIGNIFICANT CHANGE UP (ref 3.8–10.5)

## 2024-12-06 PROCEDURE — 88305 TISSUE EXAM BY PATHOLOGIST: CPT | Mod: 26

## 2024-12-06 DEVICE — MOCELLATOR ROTATION SINGLEUSE 4.75: Type: IMPLANTABLE DEVICE | Status: FUNCTIONAL

## 2024-12-06 DEVICE — LASER FIBER SOLTIVE 550: Type: IMPLANTABLE DEVICE | Status: FUNCTIONAL

## 2024-12-06 RX ORDER — SACUBITRIL AND VALSARTAN 24; 26 MG/1; MG/1
1 TABLET, FILM COATED ORAL
Refills: 0 | Status: DISCONTINUED | OUTPATIENT
Start: 2024-12-06 | End: 2024-12-08

## 2024-12-06 RX ORDER — FUROSEMIDE 40 MG/1
10 TABLET ORAL ONCE
Refills: 0 | Status: COMPLETED | OUTPATIENT
Start: 2024-12-07 | End: 2024-12-07

## 2024-12-06 RX ORDER — CIPROFLOXACIN HCL 750 MG
400 TABLET ORAL EVERY 12 HOURS
Refills: 0 | Status: DISCONTINUED | OUTPATIENT
Start: 2024-12-06 | End: 2024-12-06

## 2024-12-06 RX ORDER — CIPROFLOXACIN HCL 750 MG
400 TABLET ORAL ONCE
Refills: 0 | Status: COMPLETED | OUTPATIENT
Start: 2024-12-06 | End: 2024-12-06

## 2024-12-06 RX ORDER — ACETAMINOPHEN 500MG 500 MG/1
975 TABLET, COATED ORAL EVERY 6 HOURS
Refills: 0 | Status: DISCONTINUED | OUTPATIENT
Start: 2024-12-06 | End: 2024-12-08

## 2024-12-06 RX ORDER — 0.9 % SODIUM CHLORIDE 0.9 %
1000 INTRAVENOUS SOLUTION INTRAVENOUS
Refills: 0 | Status: DISCONTINUED | OUTPATIENT
Start: 2024-12-06 | End: 2024-12-07

## 2024-12-06 RX ORDER — ONDANSETRON HYDROCHLORIDE 4 MG/1
4 TABLET, FILM COATED ORAL ONCE
Refills: 0 | Status: DISCONTINUED | OUTPATIENT
Start: 2024-12-06 | End: 2024-12-06

## 2024-12-06 RX ORDER — HYDROMORPHONE HYDROCHLORIDE 2 MG/1
0.5 TABLET ORAL
Refills: 0 | Status: DISCONTINUED | OUTPATIENT
Start: 2024-12-06 | End: 2024-12-06

## 2024-12-06 RX ORDER — HYDRALAZINE HYDROCHLORIDE 10 MG/1
50 TABLET ORAL EVERY 6 HOURS
Refills: 0 | Status: DISCONTINUED | OUTPATIENT
Start: 2024-12-06 | End: 2024-12-07

## 2024-12-06 RX ORDER — INFLUENZA VIRUS VACCINE 15; 15; 15; 15 UG/.5ML; UG/.5ML; UG/.5ML; UG/.5ML
0.5 SUSPENSION INTRAMUSCULAR ONCE
Refills: 0 | Status: DISCONTINUED | OUTPATIENT
Start: 2024-12-06 | End: 2024-12-08

## 2024-12-06 RX ORDER — EMPAGLIFLOZIN 25 MG/1
1 TABLET, FILM COATED ORAL
Refills: 0 | DISCHARGE

## 2024-12-06 RX ORDER — ACETAMINOPHEN, DIPHENHYDRAMINE HCL, PHENYLEPHRINE HCL 325; 25; 5 MG/1; MG/1; MG/1
5 TABLET ORAL AT BEDTIME
Refills: 0 | Status: DISCONTINUED | OUTPATIENT
Start: 2024-12-06 | End: 2024-12-08

## 2024-12-06 RX ORDER — SPIRONOLACTONE 25 MG
25 TABLET ORAL DAILY
Refills: 0 | Status: DISCONTINUED | OUTPATIENT
Start: 2024-12-06 | End: 2024-12-08

## 2024-12-06 RX ORDER — FUROSEMIDE 40 MG/1
10 TABLET ORAL ONCE
Refills: 0 | Status: COMPLETED | OUTPATIENT
Start: 2024-12-06 | End: 2024-12-06

## 2024-12-06 RX ORDER — FENTANYL 12 UG/H
25 PATCH, EXTENDED RELEASE TRANSDERMAL
Refills: 0 | Status: DISCONTINUED | OUTPATIENT
Start: 2024-12-06 | End: 2024-12-06

## 2024-12-06 RX ORDER — CIPROFLOXACIN HCL 750 MG
TABLET ORAL
Refills: 0 | Status: DISCONTINUED | OUTPATIENT
Start: 2024-12-06 | End: 2024-12-06

## 2024-12-06 RX ADMIN — Medication 200 MILLIGRAM(S): at 11:29

## 2024-12-06 RX ADMIN — SACUBITRIL AND VALSARTAN 1 TABLET(S): 24; 26 TABLET, FILM COATED ORAL at 18:24

## 2024-12-06 RX ADMIN — Medication 100 MILLILITER(S): at 18:24

## 2024-12-06 RX ADMIN — Medication 25 MILLIGRAM(S): at 15:08

## 2024-12-06 RX ADMIN — FUROSEMIDE 10 MILLIGRAM(S): 40 TABLET ORAL at 11:29

## 2024-12-06 RX ADMIN — HYDRALAZINE HYDROCHLORIDE 50 MILLIGRAM(S): 10 TABLET ORAL at 14:19

## 2024-12-06 RX ADMIN — ACETAMINOPHEN 500MG 975 MILLIGRAM(S): 500 TABLET, COATED ORAL at 06:53

## 2024-12-06 NOTE — DISCHARGE NOTE PROVIDER - NSDCCPCAREPLAN_GEN_ALL_CORE_FT
PRINCIPAL DISCHARGE DIAGNOSIS  Diagnosis: History of BPH  Assessment and Plan of Treatment: Follow up with your surgeon in 2 weeks. Contact a physician or present to the nearest emergency room for inability to urinate, fever, chills, or pain not relieved by medications. Do not drive while taking prescription pain medications.

## 2024-12-06 NOTE — PATIENT PROFILE ADULT - FALL HARM RISK - RISK INTERVENTIONS
Assistance OOB with selected safe patient handling equipment/Assistance with ambulation/Communicate Fall Risk and Risk Factors to all staff, patient, and family/Discuss with provider need for PT consult/Monitor gait and stability Assistance OOB with selected safe patient handling equipment/Assistance with ambulation/Communicate Fall Risk and Risk Factors to all staff, patient, and family/Monitor gait and stability/Reinforce activity limits and safety measures with patient and family/Sit up slowly, dangle for a short time, stand at bedside before walking/Use of alarms - bed, chair and/or voice tab/Visual Cue: Yellow wristband/Bed in lowest position, wheels locked, appropriate side rails in place/Call bell, personal items and telephone in reach/Instruct patient to call for assistance before getting out of bed or chair/Non-slip footwear when patient is out of bed/[place] to call system/Physically safe environment - no spills, clutter or unnecessary equipment/Purposeful Proactive Rounding/Room/bathroom lighting operational, light cord in reach

## 2024-12-06 NOTE — BRIEF OPERATIVE NOTE - NSICDXBRIEFPREOP_GEN_ALL_CORE_FT
Get blood/urine testing downstairs    For your knee pain, try diclofenac (voltaren) 1% gel. Can apply four times daily as needed for knee pain. Let me know if this doesn't work and we can try prescription strength.     Stop aspirin and avoid all NSAIDs like ibuprofen, diclofenac, aleve, naproxen due to your kidney disease.     We will likely switch your blood pressure medicines after your blood and urine testing    See Urogynecology: Call [phone] or visit [email or web address]     Ways to improve sleep:  - Avoid Caffeine, smoking, and alcohol   - Exercise daily, but don't exercise, eat or drink large amounts of liquid within 2 hours of your bedtime.  - Only get in bed once you're tired  - Don't watch TV in bed. Don't use any screens within an hour before bed  - Don't nap during the day    
PRE-OP DIAGNOSIS:  BPH with urinary obstruction 06-Dec-2024 10:42:21  Radha Tiwari

## 2024-12-06 NOTE — DISCHARGE NOTE PROVIDER - HOSPITAL COURSE
Patient presents to [facility] for elective surgery.  Patient under went Laser enucleation of prostate with morcellation.  Patient tolerated surgery well , CBI in progress and patient admitted. 78M underwent elective HOLEP on 12/6. Procedure was uncomplicated and he was admitted to the hospital. CBI was initiated and titrated to effect until hematuria resolved. on 12/8 hematuria completely resolved even after cessation of CBI. Velasquez was removed and he was able to void without assistance. Patient is stable: tolerating diet, voiding, ambulating, pain well controlled.

## 2024-12-06 NOTE — PATIENT PROFILE ADULT - HEALTH LITERACY
Detail Level: Zone Initiate Treatment: ketoconazole shampoo 2%\\napply weekly or prn as needed for flakes Render In Strict Bullet Format?: No no

## 2024-12-06 NOTE — DISCHARGE NOTE PROVIDER - NSDCCPTREATMENT_GEN_ALL_CORE_FT
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

## 2024-12-06 NOTE — DISCHARGE NOTE PROVIDER - CARE PROVIDER_API CALL
Rahda Tiwari  Urology  [address], [address]  [place] [address]  Phone: [phone]  Fax: [phone]  Follow Up Time:

## 2024-12-06 NOTE — DISCHARGE NOTE PROVIDER - NSDCMRMEDTOKEN_GEN_ALL_CORE_FT
aspirin 81 mg oral delayed release tablet: 1 tab(s) orally once a day  atorvastatin 40 mg oral tablet: 1 tab(s) orally once a day (at bedtime)  Entresto 97 mg-103 mg oral tablet: 1 tab(s) orally 2 times a day  furosemide 40 mg oral tablet: 1 tab(s) orally once a day Start on 1/19/24  gabapentin 800 mg oral tablet: 1 tab(s) orally 3 times a day  Jardiance 10 mg oral tablet: 1 tab(s) orally once a day  spironolactone 25 mg oral tablet: 1 tab(s) orally once a day   acetaminophen 325 mg oral tablet: 3 tab(s) orally every 6 hours  aspirin 81 mg oral delayed release tablet: 1 tab(s) orally once a day  atorvastatin 40 mg oral tablet: 1 tab(s) orally once a day (at bedtime)  Entresto 97 mg-103 mg oral tablet: 1 tab(s) orally 2 times a day  furosemide 40 mg oral tablet: 1 tab(s) orally once a day Start on 1/19/24  gabapentin 800 mg oral tablet: 1 tab(s) orally 3 times a day  Jardiance 10 mg oral tablet: 1 tab(s) orally once a day  melatonin 5 mg oral tablet: 1 tab(s) orally once a day (at bedtime)  spironolactone 25 mg oral tablet: 1 tab(s) orally once a day

## 2024-12-06 NOTE — DISCHARGE NOTE PROVIDER - NSDCFUSCHEDAPPT_GEN_ALL_CORE_FT
Samira Andrews  [facility]  UROLOGY [address]  Scheduled Appointment: 12/12/2024    [facility]  UROLOGY [address] [facility] [address] [address] [facility]  Scheduled Appointment: 12/12/2024    Layton Avilez  [facility]  CARDIOLOGY [address]  Scheduled Appointment: 01/23/2025

## 2024-12-06 NOTE — BRIEF OPERATIVE NOTE - OPERATION/FINDINGS
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

## 2024-12-07 LAB
ANION GAP SERPL CALC-SCNC: 8 MMOL/L — SIGNIFICANT CHANGE UP (ref 5–17)
BUN SERPL-MCNC: 13.7 MG/DL — SIGNIFICANT CHANGE UP (ref 8–20)
CALCIUM SERPL-MCNC: 7.4 MG/DL — LOW (ref 8.4–10.5)
CHLORIDE SERPL-SCNC: 108 MMOL/L — SIGNIFICANT CHANGE UP (ref 96–108)
CO2 SERPL-SCNC: 24 MMOL/L — SIGNIFICANT CHANGE UP (ref 22–29)
CREAT SERPL-MCNC: 1.19 MG/DL — SIGNIFICANT CHANGE UP (ref 0.5–1.3)
EGFR: 62 ML/MIN/1.73M2 — SIGNIFICANT CHANGE UP
GLUCOSE SERPL-MCNC: 105 MG/DL — HIGH (ref 70–99)
HCT VFR BLD CALC: 29.9 % — LOW (ref 39–50)
HGB BLD-MCNC: 9.9 G/DL — LOW (ref 13–17)
MCHC RBC-ENTMCNC: 30.7 PG — SIGNIFICANT CHANGE UP (ref 27–34)
MCHC RBC-ENTMCNC: 33.1 G/DL — SIGNIFICANT CHANGE UP (ref 32–36)
MCV RBC AUTO: 92.9 FL — SIGNIFICANT CHANGE UP (ref 80–100)
PLATELET # BLD AUTO: 202 K/UL — SIGNIFICANT CHANGE UP (ref 150–400)
POTASSIUM SERPL-MCNC: 4.3 MMOL/L — SIGNIFICANT CHANGE UP (ref 3.5–5.3)
POTASSIUM SERPL-SCNC: 4.3 MMOL/L — SIGNIFICANT CHANGE UP (ref 3.5–5.3)
RBC # BLD: 3.22 M/UL — LOW (ref 4.2–5.8)
RBC # FLD: 12.6 % — SIGNIFICANT CHANGE UP (ref 10.3–14.5)
SODIUM SERPL-SCNC: 140 MMOL/L — SIGNIFICANT CHANGE UP (ref 135–145)
WBC # BLD: 8.79 K/UL — SIGNIFICANT CHANGE UP (ref 3.8–10.5)
WBC # FLD AUTO: 8.79 K/UL — SIGNIFICANT CHANGE UP (ref 3.8–10.5)

## 2024-12-07 RX ORDER — HYDRALAZINE HYDROCHLORIDE 10 MG/1
50 TABLET ORAL EVERY 6 HOURS
Refills: 0 | Status: DISCONTINUED | OUTPATIENT
Start: 2024-12-07 | End: 2024-12-08

## 2024-12-07 RX ADMIN — ACETAMINOPHEN 500MG 975 MILLIGRAM(S): 500 TABLET, COATED ORAL at 13:18

## 2024-12-07 RX ADMIN — ACETAMINOPHEN 500MG 975 MILLIGRAM(S): 500 TABLET, COATED ORAL at 23:30

## 2024-12-07 RX ADMIN — ACETAMINOPHEN 500MG 975 MILLIGRAM(S): 500 TABLET, COATED ORAL at 14:15

## 2024-12-07 RX ADMIN — HYDRALAZINE HYDROCHLORIDE 50 MILLIGRAM(S): 10 TABLET ORAL at 22:10

## 2024-12-07 RX ADMIN — HYDRALAZINE HYDROCHLORIDE 50 MILLIGRAM(S): 10 TABLET ORAL at 00:31

## 2024-12-07 RX ADMIN — ACETAMINOPHEN 500MG 975 MILLIGRAM(S): 500 TABLET, COATED ORAL at 23:03

## 2024-12-07 RX ADMIN — ACETAMINOPHEN 500MG 975 MILLIGRAM(S): 500 TABLET, COATED ORAL at 06:21

## 2024-12-07 RX ADMIN — ACETAMINOPHEN 500MG 975 MILLIGRAM(S): 500 TABLET, COATED ORAL at 18:00

## 2024-12-07 RX ADMIN — HYDRALAZINE HYDROCHLORIDE 50 MILLIGRAM(S): 10 TABLET ORAL at 18:02

## 2024-12-07 RX ADMIN — ACETAMINOPHEN 500MG 975 MILLIGRAM(S): 500 TABLET, COATED ORAL at 00:29

## 2024-12-07 RX ADMIN — ACETAMINOPHEN 500MG 975 MILLIGRAM(S): 500 TABLET, COATED ORAL at 05:42

## 2024-12-07 RX ADMIN — Medication 25 MILLIGRAM(S): at 05:42

## 2024-12-07 RX ADMIN — SACUBITRIL AND VALSARTAN 1 TABLET(S): 24; 26 TABLET, FILM COATED ORAL at 17:59

## 2024-12-07 RX ADMIN — ACETAMINOPHEN 500MG 975 MILLIGRAM(S): 500 TABLET, COATED ORAL at 00:33

## 2024-12-07 RX ADMIN — ACETAMINOPHEN, DIPHENHYDRAMINE HCL, PHENYLEPHRINE HCL 5 MILLIGRAM(S): 325; 25; 5 TABLET ORAL at 23:04

## 2024-12-07 RX ADMIN — FUROSEMIDE 10 MILLIGRAM(S): 40 TABLET ORAL at 05:42

## 2024-12-07 RX ADMIN — HYDRALAZINE HYDROCHLORIDE 50 MILLIGRAM(S): 10 TABLET ORAL at 05:42

## 2024-12-07 NOTE — PROGRESS NOTE ADULT - SUBJECTIVE AND OBJECTIVE BOX
SUBJECTIVE / 24H EVENTS:    Pt seen and examined at bedside by the team during rounds. Pt found to be resting in bed with  no acute complaints. Pt denies CP, SOB, N/V, fever, chills.     MEDICATIONS  (STANDING):  acetaminophen     Tablet .. 975 milliGRAM(s) Oral every 6 hours  hydrALAZINE 50 milliGRAM(s) Oral every 6 hours  influenza  Vaccine (HIGH DOSE) 0.5 milliLiter(s) IntraMuscular once  lactated ringers. 1000 milliLiter(s) (100 mL/Hr) IV Continuous <Continuous>  melatonin 5 milliGRAM(s) Oral at bedtime  sacubitril 97 mG/valsartan 103 mG 1 Tablet(s) Oral two times a day  spironolactone 25 milliGRAM(s) Oral daily    MEDICATIONS  (PRN):      Vital Signs Last 24 Hrs  T(C): 36.7 (07 Dec 2024 09:27), Max: 37 (07 Dec 2024 00:04)  T(F): 98 (07 Dec 2024 09:27), Max: 98.6 (07 Dec 2024 00:04)  HR: 55 (07 Dec 2024 09:27) (55 - 81)  BP: 173/64 (07 Dec 2024 09:27) (160/60 - 188/56)  BP(mean): 86 (06 Dec 2024 12:30) (86 - 92)  RR: 18 (07 Dec 2024 09:27) (13 - 18)  SpO2: 92% (07 Dec 2024 09:27) (92% - 99%)    Parameters below as of 07 Dec 2024 09:27  Patient On (Oxygen Delivery Method): room air      Physical Exam  Constitutional: patient appears comfortable resting in bed, in no apparent distress  Respiratory: respirations are unlabored, no accessory muscle use, no conversational dyspnea  Cardiovascular: regular rate & rhythm  Gastrointestinal: abdomen is soft & non-distended, non-tender, no rebound tenderness / guarding  : baum in place, clear urine, CBI held  Musculoskeletal: ENGLE x 4 spontaneously, extremities are without point tenderness or deformity  Skin: mucous membranes moist, no diaphoresis, pallor, cyanosis or jaundice      I&O's Detail    06 Dec 2024 07:01  -  07 Dec 2024 07:00  --------------------------------------------------------  IN:    Continuous Bladder Irrigation (mL): 97037 mL    Lactated Ringers: 1200 mL    Oral Fluid: 240 mL  Total IN: 74979 mL    OUT:    Continuous Bladder Irrigation (mL): 48159 mL  Total OUT: 01544 mL    Total NET: -74995 mL      07 Dec 2024 07:01  -  07 Dec 2024 11:30  --------------------------------------------------------  IN:    Continuous Bladder Irrigation (mL): 3000 mL  Total IN: 3000 mL    OUT:    Continuous Bladder Irrigation (mL): 6000 mL  Total OUT: 6000 mL    Total NET: -3000 mL          LABS:                        9.9    8.79  )-----------( 202      ( 07 Dec 2024 04:34 )             29.9     12-07    140  |  108  |  13.7  ----------------------------<  105[H]  4.3   |  24.0  |  1.19    Ca    7.4[L]      07 Dec 2024 04:34        Urinalysis Basic - ( 07 Dec 2024 04:34 )    Color: x / Appearance: x / SG: x / pH: x  Gluc: 105 mg/dL / Ketone: x  / Bili: x / Urobili: x   Blood: x / Protein: x / Nitrite: x   Leuk Esterase: x / RBC: x / WBC x   Sq Epi: x / Non Sq Epi: x / Bacteria: x

## 2024-12-08 VITALS
DIASTOLIC BLOOD PRESSURE: 67 MMHG | HEART RATE: 81 BPM | TEMPERATURE: 98 F | RESPIRATION RATE: 18 BRPM | OXYGEN SATURATION: 94 % | SYSTOLIC BLOOD PRESSURE: 158 MMHG

## 2024-12-08 LAB
ANION GAP SERPL CALC-SCNC: 9 MMOL/L — SIGNIFICANT CHANGE UP (ref 5–17)
BASOPHILS # BLD AUTO: 0.03 K/UL — SIGNIFICANT CHANGE UP (ref 0–0.2)
BASOPHILS NFR BLD AUTO: 0.4 % — SIGNIFICANT CHANGE UP (ref 0–2)
BUN SERPL-MCNC: 12.7 MG/DL — SIGNIFICANT CHANGE UP (ref 8–20)
CALCIUM SERPL-MCNC: 7.9 MG/DL — LOW (ref 8.4–10.5)
CHLORIDE SERPL-SCNC: 105 MMOL/L — SIGNIFICANT CHANGE UP (ref 96–108)
CO2 SERPL-SCNC: 23 MMOL/L — SIGNIFICANT CHANGE UP (ref 22–29)
CREAT SERPL-MCNC: 0.96 MG/DL — SIGNIFICANT CHANGE UP (ref 0.5–1.3)
EGFR: 80 ML/MIN/1.73M2 — SIGNIFICANT CHANGE UP
EOSINOPHIL # BLD AUTO: 0.55 K/UL — HIGH (ref 0–0.5)
EOSINOPHIL NFR BLD AUTO: 7.2 % — HIGH (ref 0–6)
GLUCOSE SERPL-MCNC: 109 MG/DL — HIGH (ref 70–99)
HCT VFR BLD CALC: 28.2 % — LOW (ref 39–50)
HGB BLD-MCNC: 10 G/DL — LOW (ref 13–17)
IMM GRANULOCYTES NFR BLD AUTO: 1.2 % — HIGH (ref 0–0.9)
LYMPHOCYTES # BLD AUTO: 1 K/UL — SIGNIFICANT CHANGE UP (ref 1–3.3)
LYMPHOCYTES # BLD AUTO: 13.1 % — SIGNIFICANT CHANGE UP (ref 13–44)
MCHC RBC-ENTMCNC: 31.3 PG — SIGNIFICANT CHANGE UP (ref 27–34)
MCHC RBC-ENTMCNC: 35.5 G/DL — SIGNIFICANT CHANGE UP (ref 32–36)
MCV RBC AUTO: 88.4 FL — SIGNIFICANT CHANGE UP (ref 80–100)
MONOCYTES # BLD AUTO: 0.4 K/UL — SIGNIFICANT CHANGE UP (ref 0–0.9)
MONOCYTES NFR BLD AUTO: 5.2 % — SIGNIFICANT CHANGE UP (ref 2–14)
NEUTROPHILS # BLD AUTO: 5.59 K/UL — SIGNIFICANT CHANGE UP (ref 1.8–7.4)
NEUTROPHILS NFR BLD AUTO: 72.9 % — SIGNIFICANT CHANGE UP (ref 43–77)
PLATELET # BLD AUTO: 211 K/UL — SIGNIFICANT CHANGE UP (ref 150–400)
POTASSIUM SERPL-MCNC: 3.8 MMOL/L — SIGNIFICANT CHANGE UP (ref 3.5–5.3)
POTASSIUM SERPL-SCNC: 3.8 MMOL/L — SIGNIFICANT CHANGE UP (ref 3.5–5.3)
RBC # BLD: 3.19 M/UL — LOW (ref 4.2–5.8)
RBC # FLD: 12.3 % — SIGNIFICANT CHANGE UP (ref 10.3–14.5)
SODIUM SERPL-SCNC: 137 MMOL/L — SIGNIFICANT CHANGE UP (ref 135–145)
WBC # BLD: 7.66 K/UL — SIGNIFICANT CHANGE UP (ref 3.8–10.5)
WBC # FLD AUTO: 7.66 K/UL — SIGNIFICANT CHANGE UP (ref 3.8–10.5)

## 2024-12-08 PROCEDURE — 82962 GLUCOSE BLOOD TEST: CPT

## 2024-12-08 PROCEDURE — 80048 BASIC METABOLIC PNL TOTAL CA: CPT

## 2024-12-08 PROCEDURE — 82009 KETONE BODYS QUAL: CPT

## 2024-12-08 PROCEDURE — 85025 COMPLETE CBC W/AUTO DIFF WBC: CPT

## 2024-12-08 PROCEDURE — C1889: CPT

## 2024-12-08 PROCEDURE — 88305 TISSUE EXAM BY PATHOLOGIST: CPT

## 2024-12-08 PROCEDURE — C1782: CPT

## 2024-12-08 PROCEDURE — 36415 COLL VENOUS BLD VENIPUNCTURE: CPT

## 2024-12-08 PROCEDURE — 85027 COMPLETE CBC AUTOMATED: CPT

## 2024-12-08 RX ORDER — ACETAMINOPHEN, DIPHENHYDRAMINE HCL, PHENYLEPHRINE HCL 325; 25; 5 MG/1; MG/1; MG/1
1 TABLET ORAL
Qty: 0 | Refills: 0 | DISCHARGE
Start: 2024-12-08

## 2024-12-08 RX ORDER — HYDRALAZINE HYDROCHLORIDE 10 MG/1
5 TABLET ORAL ONCE
Refills: 0 | Status: COMPLETED | OUTPATIENT
Start: 2024-12-08 | End: 2024-12-08

## 2024-12-08 RX ORDER — ACETAMINOPHEN 500MG 500 MG/1
3 TABLET, COATED ORAL
Qty: 0 | Refills: 0 | DISCHARGE
Start: 2024-12-08

## 2024-12-08 RX ADMIN — ACETAMINOPHEN 500MG 975 MILLIGRAM(S): 500 TABLET, COATED ORAL at 11:36

## 2024-12-08 RX ADMIN — ACETAMINOPHEN 500MG 975 MILLIGRAM(S): 500 TABLET, COATED ORAL at 05:18

## 2024-12-08 RX ADMIN — SACUBITRIL AND VALSARTAN 1 TABLET(S): 24; 26 TABLET, FILM COATED ORAL at 05:18

## 2024-12-08 RX ADMIN — HYDRALAZINE HYDROCHLORIDE 5 MILLIGRAM(S): 10 TABLET ORAL at 00:26

## 2024-12-08 RX ADMIN — HYDRALAZINE HYDROCHLORIDE 50 MILLIGRAM(S): 10 TABLET ORAL at 11:36

## 2024-12-08 RX ADMIN — Medication 25 MILLIGRAM(S): at 05:19

## 2024-12-08 RX ADMIN — HYDRALAZINE HYDROCHLORIDE 50 MILLIGRAM(S): 10 TABLET ORAL at 05:18

## 2024-12-08 RX ADMIN — ACETAMINOPHEN 500MG 975 MILLIGRAM(S): 500 TABLET, COATED ORAL at 12:30

## 2024-12-08 RX ADMIN — ACETAMINOPHEN 500MG 975 MILLIGRAM(S): 500 TABLET, COATED ORAL at 06:18

## 2024-12-08 NOTE — DISCHARGE NOTE NURSING/CASE MANAGEMENT/SOCIAL WORK - NSDCPEFALRISK_GEN_ALL_CORE
For information on Fall & Injury Prevention, visit: [email or web address] OR  [email or web address] OR  https://www.cdc.gov/steadi/patient.html

## 2024-12-08 NOTE — DISCHARGE NOTE NURSING/CASE MANAGEMENT/SOCIAL WORK - PATIENT PORTAL LINK FT
You can access the FollowMyHealth Patient Portal offered by [facility] by registering at the following website: [email or web address]. By joining [employer]’s FollowMyHealth portal, you will also be able to view your health information using other applications (apps) compatible with our system.

## 2024-12-08 NOTE — DISCHARGE NOTE NURSING/CASE MANAGEMENT/SOCIAL WORK - FINANCIAL ASSISTANCE
[facility] provides services at a reduced cost to those who are determined to be eligible through [facility]’s financial assistance program. Information regarding [facility]’s financial assistance program can be found by going to [email or web address] or by calling [phone].

## 2024-12-08 NOTE — PROGRESS NOTE ADULT - SUBJECTIVE AND OBJECTIVE BOX
Subjective:  Pt offers no acute complaints. Denies abdominal pain, chest pain, fever/chills, shortness of breath, nausea, vomiting, diarrhea, headache.     STATUS POST:  HOLEP    POST OPERATIVE DAY #: 2    MEDICATIONS  (STANDING):  acetaminophen     Tablet .. 975 milliGRAM(s) Oral every 6 hours  hydrALAZINE 50 milliGRAM(s) Oral every 6 hours  influenza  Vaccine (HIGH DOSE) 0.5 milliLiter(s) IntraMuscular once  melatonin 5 milliGRAM(s) Oral at bedtime  sacubitril 97 mG/valsartan 103 mG 1 Tablet(s) Oral two times a day  spironolactone 25 milliGRAM(s) Oral daily    MEDICATIONS  (PRN):      Vital Signs Last 24 Hrs  T(C): 36.8 (08 Dec 2024 08:07), Max: 36.9 (07 Dec 2024 20:12)  T(F): 98.3 (08 Dec 2024 08:07), Max: 98.5 (08 Dec 2024 00:16)  HR: 78 (08 Dec 2024 08:07) (51 - 80)  BP: 189/71 (08 Dec 2024 08:07) (164/58 - 189/71)  BP(mean): --  RR: 17 (08 Dec 2024 08:07) (16 - 18)  SpO2: 95% (08 Dec 2024 08:07) (95% - 98%)    Parameters below as of 08 Dec 2024 08:07  Patient On (Oxygen Delivery Method): room air        Physical Exam:    Constitutional: NAD  HEENT: PERRL, EOMI  Neck: No JVD, FROM without pain  Respiratory: Respirations non-labored, no accessory muscle use  Gastrointestinal: Soft, without tenderness, non-distended  Neurological: A&O x 3; without gross deficit  : Baum in place, CBI held, urine is clear without clots      LABS:                        10.0   7.66  )-----------( 211      ( 08 Dec 2024 04:50 )             28.2     12-08    137  |  105  |  12.7  ----------------------------<  109[H]  3.8   |  23.0  |  0.96    Ca    7.9[L]      08 Dec 2024 04:50        Urinalysis Basic - ( 08 Dec 2024 04:50 )    Color: x / Appearance: x / SG: x / pH: x  Gluc: 109 mg/dL / Ketone: x  / Bili: x / Urobili: x   Blood: x / Protein: x / Nitrite: x   Leuk Esterase: x / RBC: x / WBC x   Sq Epi: x / Non Sq Epi: x / Bacteria: x        A: 78M clinically well s/p HOLEP    Plan:   -DC Baum today  -ToV  -DC today either with or without baum depending on ToV results

## 2024-12-12 ENCOUNTER — APPOINTMENT (OUTPATIENT)
Dept: UROLOGY | Facility: CLINIC | Age: 79
End: 2024-12-12

## 2024-12-12 LAB — SURGICAL PATHOLOGY STUDY: SIGNIFICANT CHANGE UP

## 2024-12-27 ENCOUNTER — APPOINTMENT (OUTPATIENT)
Dept: UROLOGY | Facility: CLINIC | Age: 79
End: 2024-12-27
Payer: MEDICARE

## 2024-12-27 VITALS
SYSTOLIC BLOOD PRESSURE: 202 MMHG | BODY MASS INDEX: 29.68 KG/M2 | DIASTOLIC BLOOD PRESSURE: 69 MMHG | WEIGHT: 212 LBS | HEIGHT: 71 IN | HEART RATE: 65 BPM

## 2024-12-27 DIAGNOSIS — N39.0 URINARY TRACT INFECTION, SITE NOT SPECIFIED: ICD-10-CM

## 2024-12-27 PROCEDURE — 99213 OFFICE O/P EST LOW 20 MIN: CPT | Mod: 24

## 2024-12-27 RX ORDER — CIPROFLOXACIN HYDROCHLORIDE 500 MG/1
500 TABLET, FILM COATED ORAL
Qty: 14 | Refills: 0 | Status: ACTIVE | COMMUNITY
Start: 2024-12-27 | End: 1900-01-01

## 2025-01-04 LAB — BACTERIA UR CULT: ABNORMAL

## 2025-01-07 NOTE — ASU PREOP CHECKLIST - PATIENT PROBLEMS/NEEDS
Pt arrived to floor via wheelchair with transport and transferred to bed. Pt changed into gown , socks. IV palced, SCDs applied, oriented to room, call light placed within reach, bed low and locked Pt complains of pain 8/10. No acute distress noted at this time.    Patient expressed no known problems or needs

## 2025-01-10 ENCOUNTER — APPOINTMENT (OUTPATIENT)
Dept: UROLOGY | Facility: CLINIC | Age: 80
End: 2025-01-10

## 2025-01-23 ENCOUNTER — APPOINTMENT (OUTPATIENT)
Dept: CARDIOLOGY | Facility: CLINIC | Age: 80
End: 2025-01-23

## 2025-02-24 ENCOUNTER — RX RENEWAL (OUTPATIENT)
Age: 80
End: 2025-02-24

## 2025-05-15 NOTE — PATIENT PROFILE ADULT - FALL HARM RISK - TYPE OF ASSESSMENT
Increasing over the past 2 days  Defer treatment to Nephrology  Follow the phosphorus level   Admission

## (undated) DEVICE — PREP BETADINE KIT

## (undated) DEVICE — DRAPE C ARM UNIVERSAL

## (undated) DEVICE — ELCTR PLASMA BUTTON OVAL 24FR 12-30 DEG

## (undated) DEVICE — PACK CYSTOSCOPY TIBURON

## (undated) DEVICE — Device

## (undated) DEVICE — TUBING TUR 2 PRONG

## (undated) DEVICE — GLV 7.5 PROTEXIS (WHITE)

## (undated) DEVICE — ELCTR PLASMA LOOP MEDIUM 24FR 12-30 DEG

## (undated) DEVICE — ADAPTER URETHRAL CATH CONNECTING

## (undated) DEVICE — CATH SET RADIAL ARTERY 20G 1 3/4

## (undated) DEVICE — SYR LUER LOK 20CC

## (undated) DEVICE — DRAPE TOWEL BLUE 17" X 24"

## (undated) DEVICE — LAP PAD W RING 18 X 18"

## (undated) DEVICE — TUBING IRR SET FOR CYSTOSCOPY 77"

## (undated) DEVICE — TRAP SPECIMEN SPUTUM 40CC

## (undated) DEVICE — DRAPE DRAINAGE BAG URO CATCH II

## (undated) DEVICE — VENODYNE/SCD SLEEVE CALF MEDIUM

## (undated) DEVICE — WARMING BLANKET UPPER ADULT

## (undated) DEVICE — SYR LUER LOK 50CC

## (undated) DEVICE — FLOORMAT SURGISAFE ABSORBANT WHITE 36" X 72"

## (undated) DEVICE — TUBING LEVEL ONE NORMOFLO SET

## (undated) DEVICE — SYR LUER LOK 30CC